# Patient Record
Sex: FEMALE | Race: WHITE | NOT HISPANIC OR LATINO | Employment: OTHER | URBAN - METROPOLITAN AREA
[De-identification: names, ages, dates, MRNs, and addresses within clinical notes are randomized per-mention and may not be internally consistent; named-entity substitution may affect disease eponyms.]

---

## 2017-11-13 ENCOUNTER — TRANSCRIBE ORDERS (OUTPATIENT)
Dept: ADMINISTRATIVE | Facility: HOSPITAL | Age: 82
End: 2017-11-13

## 2017-11-13 DIAGNOSIS — Z12.31 VISIT FOR SCREENING MAMMOGRAM: Primary | ICD-10-CM

## 2018-01-03 ENCOUNTER — HOSPITAL ENCOUNTER (OUTPATIENT)
Dept: MAMMOGRAPHY | Facility: HOSPITAL | Age: 83
Discharge: HOME | End: 2018-01-03
Admitting: INTERNAL MEDICINE

## 2018-01-03 DIAGNOSIS — Z12.31 VISIT FOR SCREENING MAMMOGRAM: ICD-10-CM

## 2018-01-03 PROCEDURE — 77067 SCR MAMMO BI INCL CAD: CPT | Performed by: RADIOLOGY

## 2018-01-03 PROCEDURE — 77067 SCR MAMMO BI INCL CAD: CPT

## 2018-01-03 PROCEDURE — 77063 BREAST TOMOSYNTHESIS BI: CPT

## 2018-01-03 PROCEDURE — 77063 BREAST TOMOSYNTHESIS BI: CPT | Performed by: RADIOLOGY

## 2018-12-11 ENCOUNTER — TRANSCRIBE ORDERS (OUTPATIENT)
Dept: ADMINISTRATIVE | Facility: HOSPITAL | Age: 83
End: 2018-12-11

## 2018-12-11 DIAGNOSIS — Z12.31 VISIT FOR SCREENING MAMMOGRAM: Primary | ICD-10-CM

## 2019-01-29 ENCOUNTER — HOSPITAL ENCOUNTER (OUTPATIENT)
Dept: MAMMOGRAPHY | Facility: HOSPITAL | Age: 84
Discharge: HOME | End: 2019-01-29
Admitting: INTERNAL MEDICINE

## 2019-01-29 DIAGNOSIS — Z12.31 VISIT FOR SCREENING MAMMOGRAM: ICD-10-CM

## 2019-01-29 PROCEDURE — 77067 SCR MAMMO BI INCL CAD: CPT

## 2019-01-29 PROCEDURE — 77063 BREAST TOMOSYNTHESIS BI: CPT

## 2019-01-29 PROCEDURE — 77063 BREAST TOMOSYNTHESIS BI: CPT | Performed by: RADIOLOGY

## 2019-01-29 PROCEDURE — 77067 SCR MAMMO BI INCL CAD: CPT | Performed by: RADIOLOGY

## 2019-12-17 ENCOUNTER — TRANSCRIBE ORDERS (OUTPATIENT)
Dept: ADMINISTRATIVE | Facility: HOSPITAL | Age: 84
End: 2019-12-17

## 2019-12-17 DIAGNOSIS — Z12.31 VISIT FOR SCREENING MAMMOGRAM: Primary | ICD-10-CM

## 2020-01-02 DIAGNOSIS — Z12.11 SCREENING FOR COLON CANCER: Primary | ICD-10-CM

## 2020-01-07 ENCOUNTER — TELEPHONE (OUTPATIENT)
Dept: GASTROENTEROLOGY | Facility: CLINIC | Age: 85
End: 2020-01-07

## 2020-01-07 NOTE — TELEPHONE ENCOUNTER
I called patient back. No answer; left voice message with Bowel prep instructions for Tommy BARTLETT

## 2020-01-08 ENCOUNTER — OUTSIDE FACILITY SERVICE (OUTPATIENT)
Dept: GASTROENTEROLOGY | Facility: CLINIC | Age: 85
End: 2020-01-08

## 2020-01-08 ENCOUNTER — LAB REQUISITION (OUTPATIENT)
Dept: LAB | Facility: HOSPITAL | Age: 85
End: 2020-01-08

## 2020-01-08 DIAGNOSIS — Z86.010 PERSONAL HISTORY OF COLONIC POLYPS: ICD-10-CM

## 2020-01-08 PROCEDURE — 45385 COLONOSCOPY W/LESION REMOVAL: CPT | Performed by: INTERNAL MEDICINE

## 2020-01-08 PROCEDURE — 88305 TISSUE EXAM BY PATHOLOGIST: CPT | Performed by: INTERNAL MEDICINE

## 2020-01-08 PROCEDURE — 45380 COLONOSCOPY AND BIOPSY: CPT | Performed by: INTERNAL MEDICINE

## 2020-01-09 LAB
CYTO UR: NORMAL
LAB AP CASE REPORT: NORMAL
LAB AP CLINICAL INFORMATION: NORMAL
PATH REPORT.FINAL DX SPEC: NORMAL
PATH REPORT.GROSS SPEC: NORMAL

## 2020-01-10 ENCOUNTER — TELEPHONE (OUTPATIENT)
Dept: GASTROENTEROLOGY | Facility: CLINIC | Age: 85
End: 2020-01-10

## 2020-01-10 NOTE — TELEPHONE ENCOUNTER
Dr Hernandes,   I spoke with patient this afternoon. She stated that she had a Colonoscopy on 1/8/2020. Patient complains of headache and dizziness. She stated she normally don't get headaches. Headache has subsided. Yes, she can take Tylenol for headache.  I advised patient to follow up with her PCP per Dr Hernandes.

## 2020-03-10 ENCOUNTER — HOSPITAL ENCOUNTER (OUTPATIENT)
Dept: MAMMOGRAPHY | Facility: HOSPITAL | Age: 85
Discharge: HOME | End: 2020-03-10
Admitting: INTERNAL MEDICINE

## 2020-03-10 DIAGNOSIS — Z12.31 VISIT FOR SCREENING MAMMOGRAM: ICD-10-CM

## 2020-03-10 PROCEDURE — 77067 SCR MAMMO BI INCL CAD: CPT

## 2020-03-10 PROCEDURE — 77063 BREAST TOMOSYNTHESIS BI: CPT | Performed by: RADIOLOGY

## 2020-03-10 PROCEDURE — 77067 SCR MAMMO BI INCL CAD: CPT | Performed by: RADIOLOGY

## 2020-03-10 PROCEDURE — 77063 BREAST TOMOSYNTHESIS BI: CPT

## 2020-03-11 ENCOUNTER — APPOINTMENT (OUTPATIENT)
Dept: MAMMOGRAPHY | Facility: HOSPITAL | Age: 85
End: 2020-03-11

## 2021-02-15 ENCOUNTER — TRANSCRIBE ORDERS (OUTPATIENT)
Dept: ADMINISTRATIVE | Facility: HOSPITAL | Age: 86
End: 2021-02-15

## 2021-02-15 DIAGNOSIS — Z12.31 VISIT FOR SCREENING MAMMOGRAM: Primary | ICD-10-CM

## 2021-04-07 ENCOUNTER — APPOINTMENT (OUTPATIENT)
Dept: MAMMOGRAPHY | Facility: HOSPITAL | Age: 86
End: 2021-04-07

## 2021-04-30 ENCOUNTER — HOSPITAL ENCOUNTER (OUTPATIENT)
Dept: MAMMOGRAPHY | Facility: HOSPITAL | Age: 86
Discharge: HOME | End: 2021-04-30
Admitting: INTERNAL MEDICINE

## 2021-04-30 DIAGNOSIS — Z12.31 VISIT FOR SCREENING MAMMOGRAM: ICD-10-CM

## 2021-04-30 PROCEDURE — 77063 BREAST TOMOSYNTHESIS BI: CPT | Performed by: RADIOLOGY

## 2021-04-30 PROCEDURE — 77063 BREAST TOMOSYNTHESIS BI: CPT

## 2021-04-30 PROCEDURE — 77067 SCR MAMMO BI INCL CAD: CPT

## 2021-04-30 PROCEDURE — 77067 SCR MAMMO BI INCL CAD: CPT | Performed by: RADIOLOGY

## 2021-12-09 PROCEDURE — U0005 INFEC AGEN DETEC AMPLI PROBE: HCPCS | Performed by: PHYSICIAN ASSISTANT

## 2021-12-09 PROCEDURE — U0004 COV-19 TEST NON-CDC HGH THRU: HCPCS | Performed by: PHYSICIAN ASSISTANT

## 2022-04-12 ENCOUNTER — TRANSCRIBE ORDERS (OUTPATIENT)
Dept: ADMINISTRATIVE | Facility: HOSPITAL | Age: 87
End: 2022-04-12

## 2022-04-12 DIAGNOSIS — Z12.31 VISIT FOR SCREENING MAMMOGRAM: Primary | ICD-10-CM

## 2022-05-03 ENCOUNTER — HOSPITAL ENCOUNTER (OUTPATIENT)
Dept: MAMMOGRAPHY | Facility: HOSPITAL | Age: 87
Discharge: HOME | End: 2022-05-03
Admitting: INTERNAL MEDICINE

## 2022-05-03 DIAGNOSIS — Z12.31 VISIT FOR SCREENING MAMMOGRAM: ICD-10-CM

## 2022-05-03 PROCEDURE — 77063 BREAST TOMOSYNTHESIS BI: CPT

## 2022-05-03 PROCEDURE — 77067 SCR MAMMO BI INCL CAD: CPT

## 2022-05-03 PROCEDURE — 77067 SCR MAMMO BI INCL CAD: CPT | Performed by: RADIOLOGY

## 2022-05-03 PROCEDURE — 77063 BREAST TOMOSYNTHESIS BI: CPT | Performed by: RADIOLOGY

## 2022-06-08 ENCOUNTER — APPOINTMENT (OUTPATIENT)
Dept: GENERAL RADIOLOGY | Facility: HOSPITAL | Age: 87
End: 2022-06-08

## 2022-06-08 ENCOUNTER — HOSPITAL ENCOUNTER (INPATIENT)
Facility: HOSPITAL | Age: 87
LOS: 1 days | Discharge: HOME | End: 2022-06-09
Attending: EMERGENCY MEDICINE | Admitting: INTERNAL MEDICINE

## 2022-06-08 DIAGNOSIS — I25.110 CORONARY ARTERY DISEASE INVOLVING NATIVE CORONARY ARTERY OF NATIVE HEART WITH UNSTABLE ANGINA PECTORIS: ICD-10-CM

## 2022-06-08 DIAGNOSIS — I21.4 NSTEMI (NON-ST ELEVATED MYOCARDIAL INFARCTION): Primary | ICD-10-CM

## 2022-06-08 PROBLEM — U07.1 COVID-19 VIRUS INFECTION: Status: ACTIVE | Noted: 2022-06-08

## 2022-06-08 LAB
ACT BLD: 219 SECONDS (ref 82–152)
ALBUMIN SERPL-MCNC: 3.8 G/DL (ref 3.5–5.2)
ALBUMIN/GLOB SERPL: 1.4 G/DL
ALP SERPL-CCNC: 81 U/L (ref 39–117)
ALT SERPL W P-5'-P-CCNC: 23 U/L (ref 1–33)
ANION GAP SERPL CALCULATED.3IONS-SCNC: 8 MMOL/L (ref 5–15)
APTT PPP: 29.2 SECONDS (ref 60–90)
AST SERPL-CCNC: 45 U/L (ref 1–32)
BASOPHILS # BLD AUTO: 0.02 10*3/MM3 (ref 0–0.2)
BASOPHILS NFR BLD AUTO: 0.2 % (ref 0–1.5)
BILIRUB SERPL-MCNC: 0.3 MG/DL (ref 0–1.2)
BUN SERPL-MCNC: 10 MG/DL (ref 8–23)
BUN/CREAT SERPL: 9.9 (ref 7–25)
CALCIUM SPEC-SCNC: 9.2 MG/DL (ref 8.6–10.5)
CHLORIDE SERPL-SCNC: 104 MMOL/L (ref 98–107)
CO2 SERPL-SCNC: 27 MMOL/L (ref 22–29)
CREAT SERPL-MCNC: 1.01 MG/DL (ref 0.57–1)
DEPRECATED RDW RBC AUTO: 43.6 FL (ref 37–54)
EGFRCR SERPLBLD CKD-EPI 2021: 54.3 ML/MIN/1.73
EOSINOPHIL # BLD AUTO: 0.04 10*3/MM3 (ref 0–0.4)
EOSINOPHIL NFR BLD AUTO: 0.5 % (ref 0.3–6.2)
ERYTHROCYTE [DISTWIDTH] IN BLOOD BY AUTOMATED COUNT: 12.1 % (ref 12.3–15.4)
GLOBULIN UR ELPH-MCNC: 2.7 GM/DL
GLUCOSE SERPL-MCNC: 143 MG/DL (ref 65–99)
HCT VFR BLD AUTO: 43 % (ref 34–46.6)
HGB BLD-MCNC: 14.4 G/DL (ref 12–15.9)
HOLD SPECIMEN: NORMAL
IMM GRANULOCYTES # BLD AUTO: 0.03 10*3/MM3 (ref 0–0.05)
IMM GRANULOCYTES NFR BLD AUTO: 0.4 % (ref 0–0.5)
INR PPP: 0.92 (ref 0.84–1.13)
LIPASE SERPL-CCNC: 24 U/L (ref 13–60)
LYMPHOCYTES # BLD AUTO: 2.01 10*3/MM3 (ref 0.7–3.1)
LYMPHOCYTES NFR BLD AUTO: 24.6 % (ref 19.6–45.3)
MCH RBC QN AUTO: 32.7 PG (ref 26.6–33)
MCHC RBC AUTO-ENTMCNC: 33.5 G/DL (ref 31.5–35.7)
MCV RBC AUTO: 97.7 FL (ref 79–97)
MONOCYTES # BLD AUTO: 0.48 10*3/MM3 (ref 0.1–0.9)
MONOCYTES NFR BLD AUTO: 5.9 % (ref 5–12)
NEUTROPHILS NFR BLD AUTO: 5.58 10*3/MM3 (ref 1.7–7)
NEUTROPHILS NFR BLD AUTO: 68.4 % (ref 42.7–76)
NRBC BLD AUTO-RTO: 0 /100 WBC (ref 0–0.2)
NT-PROBNP SERPL-MCNC: 2690 PG/ML (ref 0–1800)
PLATELET # BLD AUTO: 281 10*3/MM3 (ref 140–450)
PMV BLD AUTO: 11.6 FL (ref 6–12)
POTASSIUM SERPL-SCNC: 4.2 MMOL/L (ref 3.5–5.2)
PROT SERPL-MCNC: 6.5 G/DL (ref 6–8.5)
PROTHROMBIN TIME: 12.3 SECONDS (ref 11.4–14.4)
RBC # BLD AUTO: 4.4 10*6/MM3 (ref 3.77–5.28)
SODIUM SERPL-SCNC: 139 MMOL/L (ref 136–145)
TROPONIN T SERPL-MCNC: 0.57 NG/ML (ref 0–0.03)
TROPONIN T SERPL-MCNC: 0.66 NG/ML (ref 0–0.03)
TROPONIN T SERPL-MCNC: 0.71 NG/ML (ref 0–0.03)
UFH PPP CHRO-ACNC: 0.1 IU/ML (ref 0.3–0.7)
WBC NRBC COR # BLD: 8.16 10*3/MM3 (ref 3.4–10.8)
WHOLE BLOOD HOLD COAG: NORMAL
WHOLE BLOOD HOLD SPECIMEN: NORMAL

## 2022-06-08 PROCEDURE — 99284 EMERGENCY DEPT VISIT MOD MDM: CPT

## 2022-06-08 PROCEDURE — 93458 L HRT ARTERY/VENTRICLE ANGIO: CPT | Performed by: INTERNAL MEDICINE

## 2022-06-08 PROCEDURE — C1894 INTRO/SHEATH, NON-LASER: HCPCS | Performed by: INTERNAL MEDICINE

## 2022-06-08 PROCEDURE — 85730 THROMBOPLASTIN TIME PARTIAL: CPT | Performed by: PHYSICIAN ASSISTANT

## 2022-06-08 PROCEDURE — 99153 MOD SED SAME PHYS/QHP EA: CPT | Performed by: INTERNAL MEDICINE

## 2022-06-08 PROCEDURE — C1760 CLOSURE DEV, VASC: HCPCS | Performed by: INTERNAL MEDICINE

## 2022-06-08 PROCEDURE — 83690 ASSAY OF LIPASE: CPT

## 2022-06-08 PROCEDURE — G0378 HOSPITAL OBSERVATION PER HR: HCPCS

## 2022-06-08 PROCEDURE — 85520 HEPARIN ASSAY: CPT | Performed by: PHYSICIAN ASSISTANT

## 2022-06-08 PROCEDURE — 99152 MOD SED SAME PHYS/QHP 5/>YRS: CPT | Performed by: INTERNAL MEDICINE

## 2022-06-08 PROCEDURE — 25010000002 FENTANYL CITRATE (PF) 50 MCG/ML SOLUTION: Performed by: INTERNAL MEDICINE

## 2022-06-08 PROCEDURE — 25010000002 MIDAZOLAM PER 1 MG: Performed by: INTERNAL MEDICINE

## 2022-06-08 PROCEDURE — 93571 IV DOP VEL&/PRESS C FLO 1ST: CPT | Performed by: INTERNAL MEDICINE

## 2022-06-08 PROCEDURE — 71045 X-RAY EXAM CHEST 1 VIEW: CPT

## 2022-06-08 PROCEDURE — C1887 CATHETER, GUIDING: HCPCS | Performed by: INTERNAL MEDICINE

## 2022-06-08 PROCEDURE — B2151ZZ FLUOROSCOPY OF LEFT HEART USING LOW OSMOLAR CONTRAST: ICD-10-PCS | Performed by: INTERNAL MEDICINE

## 2022-06-08 PROCEDURE — C1769 GUIDE WIRE: HCPCS | Performed by: INTERNAL MEDICINE

## 2022-06-08 PROCEDURE — 80053 COMPREHEN METABOLIC PANEL: CPT

## 2022-06-08 PROCEDURE — 84484 ASSAY OF TROPONIN QUANT: CPT | Performed by: PHYSICIAN ASSISTANT

## 2022-06-08 PROCEDURE — 99223 1ST HOSP IP/OBS HIGH 75: CPT | Performed by: INTERNAL MEDICINE

## 2022-06-08 PROCEDURE — 93005 ELECTROCARDIOGRAM TRACING: CPT

## 2022-06-08 PROCEDURE — B2111ZZ FLUOROSCOPY OF MULTIPLE CORONARY ARTERIES USING LOW OSMOLAR CONTRAST: ICD-10-PCS | Performed by: INTERNAL MEDICINE

## 2022-06-08 PROCEDURE — 85025 COMPLETE CBC W/AUTO DIFF WBC: CPT

## 2022-06-08 PROCEDURE — 85347 COAGULATION TIME ACTIVATED: CPT

## 2022-06-08 PROCEDURE — 85610 PROTHROMBIN TIME: CPT | Performed by: PHYSICIAN ASSISTANT

## 2022-06-08 PROCEDURE — 4A023N7 MEASUREMENT OF CARDIAC SAMPLING AND PRESSURE, LEFT HEART, PERCUTANEOUS APPROACH: ICD-10-PCS | Performed by: INTERNAL MEDICINE

## 2022-06-08 PROCEDURE — 84484 ASSAY OF TROPONIN QUANT: CPT

## 2022-06-08 PROCEDURE — 0 IOPAMIDOL PER 1 ML: Performed by: INTERNAL MEDICINE

## 2022-06-08 PROCEDURE — 83880 ASSAY OF NATRIURETIC PEPTIDE: CPT

## 2022-06-08 PROCEDURE — 25010000002 HEPARIN (PORCINE) PER 1000 UNITS: Performed by: INTERNAL MEDICINE

## 2022-06-08 RX ORDER — LIDOCAINE HYDROCHLORIDE 10 MG/ML
INJECTION, SOLUTION EPIDURAL; INFILTRATION; INTRACAUDAL; PERINEURAL AS NEEDED
Status: DISCONTINUED | OUTPATIENT
Start: 2022-06-08 | End: 2022-06-08 | Stop reason: HOSPADM

## 2022-06-08 RX ORDER — ACETAMINOPHEN 325 MG/1
650 TABLET ORAL EVERY 4 HOURS PRN
Status: DISCONTINUED | OUTPATIENT
Start: 2022-06-08 | End: 2022-06-09 | Stop reason: HOSPADM

## 2022-06-08 RX ORDER — CHOLECALCIFEROL (VITAMIN D3) 125 MCG
10 CAPSULE ORAL NIGHTLY
COMMUNITY
Start: 2022-06-18

## 2022-06-08 RX ORDER — HEPARIN SODIUM 1000 [USP'U]/ML
INJECTION, SOLUTION INTRAVENOUS; SUBCUTANEOUS AS NEEDED
Status: DISCONTINUED | OUTPATIENT
Start: 2022-06-08 | End: 2022-06-08 | Stop reason: HOSPADM

## 2022-06-08 RX ORDER — GABAPENTIN 300 MG/1
300 CAPSULE ORAL 3 TIMES DAILY
Status: DISCONTINUED | OUTPATIENT
Start: 2022-06-08 | End: 2022-06-09 | Stop reason: HOSPADM

## 2022-06-08 RX ORDER — HEPARIN SODIUM 1000 [USP'U]/ML
4000 INJECTION, SOLUTION INTRAVENOUS; SUBCUTANEOUS ONCE
Status: DISCONTINUED | OUTPATIENT
Start: 2022-06-08 | End: 2022-06-09 | Stop reason: HOSPADM

## 2022-06-08 RX ORDER — ONDANSETRON 4 MG/1
4 TABLET, FILM COATED ORAL EVERY 6 HOURS PRN
Status: DISCONTINUED | OUTPATIENT
Start: 2022-06-08 | End: 2022-06-09 | Stop reason: HOSPADM

## 2022-06-08 RX ORDER — NITROGLYCERIN 20 MG/100ML
5-200 INJECTION INTRAVENOUS
Status: DISCONTINUED | OUTPATIENT
Start: 2022-06-08 | End: 2022-06-09 | Stop reason: HOSPADM

## 2022-06-08 RX ORDER — SODIUM CHLORIDE 0.9 % (FLUSH) 0.9 %
10 SYRINGE (ML) INJECTION AS NEEDED
Status: DISCONTINUED | OUTPATIENT
Start: 2022-06-08 | End: 2022-06-09 | Stop reason: HOSPADM

## 2022-06-08 RX ORDER — HEPARIN SODIUM 10000 [USP'U]/100ML
12 INJECTION, SOLUTION INTRAVENOUS
Status: DISCONTINUED | OUTPATIENT
Start: 2022-06-08 | End: 2022-06-08

## 2022-06-08 RX ORDER — ATORVASTATIN CALCIUM 40 MG/1
40 TABLET, FILM COATED ORAL NIGHTLY
Status: DISCONTINUED | OUTPATIENT
Start: 2022-06-08 | End: 2022-06-09 | Stop reason: HOSPADM

## 2022-06-08 RX ORDER — HEPARIN SODIUM 1000 [USP'U]/ML
50 INJECTION, SOLUTION INTRAVENOUS; SUBCUTANEOUS AS NEEDED
Status: DISCONTINUED | OUTPATIENT
Start: 2022-06-08 | End: 2022-06-08

## 2022-06-08 RX ORDER — GABAPENTIN 300 MG/1
600 CAPSULE ORAL NIGHTLY
COMMUNITY
Start: 2022-06-21

## 2022-06-08 RX ORDER — ASPIRIN 81 MG/1
324 TABLET, CHEWABLE ORAL ONCE
Status: DISCONTINUED | OUTPATIENT
Start: 2022-06-08 | End: 2022-06-09 | Stop reason: HOSPADM

## 2022-06-08 RX ORDER — VITAMIN B COMPLEX
1 CAPSULE ORAL DAILY
COMMUNITY

## 2022-06-08 RX ORDER — FAMOTIDINE 20 MG/1
20 TABLET, FILM COATED ORAL 2 TIMES DAILY
COMMUNITY
End: 2022-06-23

## 2022-06-08 RX ORDER — ISOSORBIDE MONONITRATE 30 MG/1
30 TABLET, EXTENDED RELEASE ORAL
Status: DISCONTINUED | OUTPATIENT
Start: 2022-06-09 | End: 2022-06-09 | Stop reason: HOSPADM

## 2022-06-08 RX ORDER — HEPARIN SODIUM 1000 [USP'U]/ML
25 INJECTION, SOLUTION INTRAVENOUS; SUBCUTANEOUS AS NEEDED
Status: DISCONTINUED | OUTPATIENT
Start: 2022-06-08 | End: 2022-06-08

## 2022-06-08 RX ORDER — SODIUM CHLORIDE 0.9 % (FLUSH) 0.9 %
10 SYRINGE (ML) INJECTION EVERY 12 HOURS SCHEDULED
Status: DISCONTINUED | OUTPATIENT
Start: 2022-06-08 | End: 2022-06-09 | Stop reason: HOSPADM

## 2022-06-08 RX ORDER — SODIUM CHLORIDE 9 MG/ML
3 INJECTION, SOLUTION INTRAVENOUS CONTINUOUS
Status: ACTIVE | OUTPATIENT
Start: 2022-06-08 | End: 2022-06-08

## 2022-06-08 RX ORDER — MIDAZOLAM HYDROCHLORIDE 1 MG/ML
INJECTION INTRAMUSCULAR; INTRAVENOUS AS NEEDED
Status: DISCONTINUED | OUTPATIENT
Start: 2022-06-08 | End: 2022-06-08 | Stop reason: HOSPADM

## 2022-06-08 RX ORDER — CHOLECALCIFEROL (VITAMIN D3) 125 MCG
10 CAPSULE ORAL NIGHTLY
Status: DISCONTINUED | OUTPATIENT
Start: 2022-06-08 | End: 2022-06-09 | Stop reason: HOSPADM

## 2022-06-08 RX ORDER — ASPIRIN 81 MG/1
81 TABLET, CHEWABLE ORAL DAILY
COMMUNITY

## 2022-06-08 RX ORDER — ALUMINA, MAGNESIA, AND SIMETHICONE 2400; 2400; 240 MG/30ML; MG/30ML; MG/30ML
15 SUSPENSION ORAL EVERY 6 HOURS PRN
Status: DISCONTINUED | OUTPATIENT
Start: 2022-06-08 | End: 2022-06-09 | Stop reason: HOSPADM

## 2022-06-08 RX ORDER — FLUTICASONE PROPIONATE 50 MCG
2 SPRAY, SUSPENSION (ML) NASAL DAILY
COMMUNITY

## 2022-06-08 RX ORDER — DIPHENHYDRAMINE HCL 25 MG
25 TABLET ORAL 2 TIMES DAILY PRN
COMMUNITY
Start: 2022-06-21

## 2022-06-08 RX ORDER — LISINOPRIL 5 MG/1
5 TABLET ORAL DAILY
Status: DISCONTINUED | OUTPATIENT
Start: 2022-06-08 | End: 2022-06-09 | Stop reason: HOSPADM

## 2022-06-08 RX ORDER — FENTANYL CITRATE 50 UG/ML
INJECTION, SOLUTION INTRAMUSCULAR; INTRAVENOUS AS NEEDED
Status: DISCONTINUED | OUTPATIENT
Start: 2022-06-08 | End: 2022-06-08 | Stop reason: HOSPADM

## 2022-06-08 RX ORDER — ONDANSETRON 2 MG/ML
4 INJECTION INTRAMUSCULAR; INTRAVENOUS EVERY 6 HOURS PRN
Status: DISCONTINUED | OUTPATIENT
Start: 2022-06-08 | End: 2022-06-09 | Stop reason: HOSPADM

## 2022-06-08 RX ORDER — ASPIRIN 81 MG/1
81 TABLET ORAL DAILY
Status: DISCONTINUED | OUTPATIENT
Start: 2022-06-09 | End: 2022-06-09 | Stop reason: HOSPADM

## 2022-06-08 RX ORDER — HYDROCODONE BITARTRATE AND ACETAMINOPHEN 5; 325 MG/1; MG/1
1 TABLET ORAL EVERY 4 HOURS PRN
Status: DISCONTINUED | OUTPATIENT
Start: 2022-06-08 | End: 2022-06-09 | Stop reason: HOSPADM

## 2022-06-08 RX ADMIN — SODIUM CHLORIDE 3 ML/KG/HR: 9 INJECTION, SOLUTION INTRAVENOUS at 17:11

## 2022-06-08 RX ADMIN — GABAPENTIN 300 MG: 300 CAPSULE ORAL at 21:19

## 2022-06-08 RX ADMIN — ATORVASTATIN CALCIUM 40 MG: 40 TABLET, FILM COATED ORAL at 20:54

## 2022-06-08 RX ADMIN — Medication 10 MG: at 21:19

## 2022-06-08 NOTE — ED PROVIDER NOTES
"Subjective   Ms. Betts is a pleasant 87-year-old female who presents to the emergency department with complaints of \"chest tightness\" that began this morning at around 930 while at rest.  The patient had no associated nausea or vomiting or shortness of breath or diaphoresis.  She did have some flushed face sensation.  The patient went to urgent care and was referred to our emergency department for further evaluation.  The patient states that she was recently diagnosed with COVID about 2 weeks ago and has completed her 10-day quarantine.  She denies any significant cough.  No fevers.  She has no other symptoms other than chronic leg cramps for which she takes gabapentin.  She also has a history of GERD.  No prior history of heart disease.  She states that she has never had any heart catheterization or stress test in the past.  She denies any history of hypertension or hyperlipidemia.  She is a non-smoker.  She drinks a cocktail with dinner every night.          Review of Systems   Constitutional: Negative for chills, diaphoresis and fever.   HENT: Negative for sore throat.    Respiratory: Positive for chest tightness. Negative for cough and shortness of breath.    Cardiovascular: Positive for chest pain.   Gastrointestinal: Negative for abdominal pain, diarrhea, nausea and vomiting.   Endocrine: Negative for polyuria.   Genitourinary: Negative for dysuria.   Musculoskeletal: Negative for back pain.   Skin: Negative for pallor and rash.   Neurological: Negative for light-headedness and headaches.   Hematological: Negative.    Psychiatric/Behavioral: Negative.        History reviewed. No pertinent past medical history.    Not on File    History reviewed. No pertinent surgical history.    Family History   Problem Relation Age of Onset   • Ovarian cancer Daughter 46   • Breast cancer Maternal Aunt 60       Social History     Socioeconomic History   • Marital status:    Tobacco Use   • Smoking status: Never Smoker "   • Smokeless tobacco: Never Used   Vaping Use   • Vaping Use: Never used   Substance and Sexual Activity   • Alcohol use: Never   • Drug use: Never   • Sexual activity: Defer           Objective   Physical Exam  HENT:      Head: Normocephalic.      Nose: Nose normal.      Mouth/Throat:      Mouth: Mucous membranes are moist.   Eyes:      General: No scleral icterus.     Conjunctiva/sclera: Conjunctivae normal.      Pupils: Pupils are equal, round, and reactive to light.   Cardiovascular:      Rate and Rhythm: Normal rate and regular rhythm.      Pulses: Normal pulses.      Heart sounds: Normal heart sounds.   Pulmonary:      Effort: Pulmonary effort is normal. No respiratory distress.      Breath sounds: Normal breath sounds. No wheezing, rhonchi or rales.   Abdominal:      General: Bowel sounds are normal.      Tenderness: There is no abdominal tenderness. There is no guarding.   Musculoskeletal:         General: No tenderness. Normal range of motion.      Cervical back: Normal range of motion.      Right lower leg: No edema.      Left lower leg: No edema.   Skin:     General: Skin is warm and dry.      Findings: No rash.   Neurological:      General: No focal deficit present.      Mental Status: She is alert and oriented to person, place, and time.   Psychiatric:         Mood and Affect: Mood normal.         Critical Care  Performed by: Cecilio Grier PA  Authorized by: Gaston Mejia MD     Critical care provider statement:     Critical care time (minutes):  60    Critical care time was exclusive of:  Separately billable procedures and treating other patients    Critical care was necessary to treat or prevent imminent or life-threatening deterioration of the following conditions: NSTEMI requiring heparin bolus and infusion and nitroglycerin infusion.    Critical care was time spent personally by me on the following activities:  Development of treatment plan with patient or surrogate, evaluation of  "patient's response to treatment, examination of patient, obtaining history from patient or surrogate, ordering and performing treatments and interventions, ordering and review of laboratory studies, ordering and review of radiographic studies, pulse oximetry, re-evaluation of patient's condition, discussions with consultants and review of old charts    Heart Score:  7           ED Course      .now  Pt resting comfortably.  She states her chest tightness has \"nearly resolved\" and is currently about 2/10.  Her EKG was reviewed independently by me and shows sinus bradycardia with a rate of 57 with some T abnormalities laterally concerning for ischemia.      Initial troponin is elevated at 0.659.      ProBNP is 2690.  Glucose is up a bit at 143. No other concerning labs.    Chest xray was reviewed by me and shows nothing acute.     Given her elevated troponin and her EKG abnormalities, I think this is likely an NSTEMI.  I started her on Heparin bolus and drip and Nitro drip and gave her an aspirin.      !400:  I spoke with Dr. Koo (cardiology) who advised they would come and see her in the ER.  He advised that he did not need a COVID swab since she was 2 wks out from [facility].      Dr. Koo saw the pt in the ER and states he will plan to take her to the cath lab.                                                             [facility]    Final diagnoses:   NSTEMI (non-ST elevated myocardial infarction) (HCC)       ED Disposition  ED Disposition     ED Disposition   Decision to Admit    Condition   --    Comment   Level of Care: Telemetry [5]   Diagnosis: NSTEMI (non-ST elevated myocardial infarction) (HCC) [656243]               No follow-up provider specified.       Medication List      No changes were made to your prescriptions during this visit.          Cecilio Grier PA  06/08/22 5189    "

## 2022-06-08 NOTE — PLAN OF CARE
Goal Outcome Evaluation:  Plan of Care Reviewed With: patient, son        Progress: improving  Outcome Evaluation: Patient receved from cath lab.  Son at bedside.

## 2022-06-08 NOTE — H&P
[facility] at [facility]  HISTORY AND PHYSICAL    Monique Betts  : 1935  MRN:[id number]    Date of Admission:2022    PCP: Mignon Zamora MD    IDENTIFICATION: A 87 y.o. female resident of [place], [place]     Chief Complaint   Patient presents with   • Chest Pain     PROBLEM LIST:   Active Hospital Problems    Diagnosis    • **NSTEMI (non-ST elevated myocardial infarction) (HCC)    • COVID-19 virus infection      HPI: Mrs. Betts is a pleasant 88 y/o female with no significant past medical history and no prior cardiac history who is seen in consultation for NSTEMI. She reports midsternal chest tightness which started this morning while she was out and about doing her regular activities. She was diagnosed with Covid on  and completed her 10 day quarantine on Friday 6/3. She had only mild symptoms of cough. Denies any significant dyspnea, and no chest pain prior to today. In the ER her initial troponin is positive at 0.659, and EKG shows inferolateral T wave inversions. Currently comfortable at rest.     ROS: All systems have been reviewed and are negative with the exception of those mentioned in the HPI and problem list above.    ALLERGIES: Not on File    HOME MEDICINES:   Prior to Admission Medications     Prescriptions Last Dose Informant Patient Reported? Taking?    Sod Picosulfate-Mag Ox-Cit Acd 10-3.5-12 MG-GM -GM/160ML solution   No No    Take 1 kit by mouth Take As Directed. Follow instructions that were mailed to your home. If you didn't receive these call [phone].          Surgical History: No past surgical history on file.    Social History:   Social History     Socioeconomic History   • Marital status:        Family History:   Family History   Problem Relation Age of Onset   • Ovarian cancer Daughter 46   • Breast cancer Maternal Aunt 60       Objective     /65 (BP Location: Left arm, Patient Position: Sitting)   Pulse 51   Temp 98 °F (36.7  "°C) (Oral)   Resp 16   Ht 162.6 cm (64\")   Wt 70.8 kg (156 lb)   SpO2 97%   BMI 26.78 kg/m²   No intake or output data in the 24 hours ending 06/08/22 1417    PHYSICAL EXAM:  CONSTITUTIONAL: Well nourished, cooperative, in no acute distress  HEENT: Normocephalic, atraumatic, PERRLA, no JVD  CARDIOVASCULAR:  Regular rhythm and normal rate, no murmur, gallop, rub.   RESPIRATORY: Clear to auscultation, normal respiratory effort, no wheezing, rales or ronchi  GI: Soft, nontender, normal bowel sounds  EXTREMITIES: No gross deformities, no edema.   SKIN: Warm, dry. No bleeding, bruising or rash  NEUROLOGICAL: No focal deficits  PSYCHIATRIC: Normal mood and affect. Behavior is normal     Labs/Diagnostic Data  Results from last 7 days   Lab Units 06/08/22  1208   SODIUM mmol/L 139   POTASSIUM mmol/L 4.2   CHLORIDE mmol/L 104   CO2 mmol/L 27.0   BUN mg/dL 10   CREATININE mg/dL 1.01*   GLUCOSE mg/dL 143*   CALCIUM mg/dL 9.2     Results from last 7 days   Lab Units 06/08/22  1208   TROPONIN T ng/mL 0.659*     Results from last 7 days   Lab Units 06/08/22  1208   WBC 10*3/mm3 8.16   HEMOGLOBIN g/dL 14.4   HEMATOCRIT % 43.0   PLATELETS 10*3/mm3 281         Results from last 7 days   Lab Units 06/08/22  1208   PROBNP pg/mL 2,690.0*           I personally reviewed the patient's EKG/Telemetry data    Radiology Data:   CXR 6/8/22:    IMPRESSION:  No evidence of acute disease in the chest.     Current Medications:    aspirin, 324 mg, Oral, Once  heparin (porcine), 4,000 Units, Intravenous, Once      heparin, 12 Units/kg/hr  nitroglycerin, 5-200 mcg/min  Pharmacy to Dose Heparin,         Assessment and Plan:     1. NSTEMI   - troponin 0.659, EKG with inferolateral T wave inversions  - proceed with [facility] today - risks, benefits and alternatives discussed with the patient and she is aware and agrees to proceed  - check echo     2. Recent Covid 19 infection  - diagnosed 5/25, completed 10 day quarantine 6/3    3. Unknown lipid " status  - check AM lipid panel      Scribed for Antonino Koo MD by Natalie Way PA-C. 6/8/2022  14:40 EDT    The risks, benefits, and alternative options of cardiac catheterization were discussed with the patient and her son by phone.  The risks include death, MI, stroke, infection, vascular injury requiring surgical repair and/or blood transfusion, coronary dissection, renal dysfunction/failure, allergic reaction, emergent CABG.  If PCI is needed there is a 1-2% risk of emergent CABG.  The patient is agreeable for cardiac catheterization, possible PCI or CABG. Plan is to proceed with cardiac catheterization and possible PCI.     I,Antonino Koo M.D., personally performed the services described in this documentation as scribed by the above named individual in my presence, and it is both accurate and complete.      Antonino Koo M.D., F.A.C.C.  Interventional Cardiology  06/08/22  15:11 EDT

## 2022-06-09 ENCOUNTER — APPOINTMENT (OUTPATIENT)
Dept: CARDIOLOGY | Facility: HOSPITAL | Age: 87
End: 2022-06-09

## 2022-06-09 ENCOUNTER — READMISSION MANAGEMENT (OUTPATIENT)
Dept: CALL CENTER | Facility: HOSPITAL | Age: 87
End: 2022-06-09

## 2022-06-09 VITALS
OXYGEN SATURATION: 94 % | RESPIRATION RATE: 16 BRPM | TEMPERATURE: 98.6 F | BODY MASS INDEX: 26.63 KG/M2 | SYSTOLIC BLOOD PRESSURE: 123 MMHG | HEIGHT: 64 IN | HEART RATE: 51 BPM | WEIGHT: 156 LBS | DIASTOLIC BLOOD PRESSURE: 47 MMHG

## 2022-06-09 LAB
ANION GAP SERPL CALCULATED.3IONS-SCNC: 5 MMOL/L (ref 5–15)
BH CV ECHO MEAS - AI P1/2T: 600.4 MSEC
BH CV ECHO MEAS - AO MAX PG: 18.5 MMHG
BH CV ECHO MEAS - AO MEAN PG: 10.5 MMHG
BH CV ECHO MEAS - AO ROOT DIAM: 2.8 CM
BH CV ECHO MEAS - AO V2 MAX: 214.9 CM/SEC
BH CV ECHO MEAS - AO V2 VTI: 49.7 CM
BH CV ECHO MEAS - AVA(I,D): 2.32 CM2
BH CV ECHO MEAS - EDV(CUBED): 102.1 ML
BH CV ECHO MEAS - EDV(MOD-SP2): 82.1 ML
BH CV ECHO MEAS - EDV(MOD-SP4): 99.5 ML
BH CV ECHO MEAS - EF(MOD-SP2): 70.6 %
BH CV ECHO MEAS - EF(MOD-SP4): 69 %
BH CV ECHO MEAS - ESV(CUBED): 16.9 ML
BH CV ECHO MEAS - ESV(MOD-SP2): 24.1 ML
BH CV ECHO MEAS - ESV(MOD-SP4): 30.8 ML
BH CV ECHO MEAS - FS: 45 %
BH CV ECHO MEAS - IVS/LVPW: 1.1 CM
BH CV ECHO MEAS - IVSD: 0.82 CM
BH CV ECHO MEAS - LA DIMENSION: 4.3 CM
BH CV ECHO MEAS - LAT PEAK E' VEL: 8.3 CM/SEC
BH CV ECHO MEAS - LV MASS(C)D: 118.6 GRAMS
BH CV ECHO MEAS - LV MAX PG: 9.2 MMHG
BH CV ECHO MEAS - LV MEAN PG: 5 MMHG
BH CV ECHO MEAS - LV V1 MAX: 151.8 CM/SEC
BH CV ECHO MEAS - LV V1 VTI: 36.6 CM
BH CV ECHO MEAS - LVIDD: 4.7 CM
BH CV ECHO MEAS - LVIDS: 2.6 CM
BH CV ECHO MEAS - LVOT AREA: 3.2 CM2
BH CV ECHO MEAS - LVOT DIAM: 2 CM
BH CV ECHO MEAS - LVPWD: 0.75 CM
BH CV ECHO MEAS - MED PEAK E' VEL: 7.5 CM/SEC
BH CV ECHO MEAS - MV A MAX VEL: 90.2 CM/SEC
BH CV ECHO MEAS - MV DEC SLOPE: 337.3 CM/SEC2
BH CV ECHO MEAS - MV DEC TIME: 0.29 MSEC
BH CV ECHO MEAS - MV E MAX VEL: 81.5 CM/SEC
BH CV ECHO MEAS - MV E/A: 0.9
BH CV ECHO MEAS - MV MAX PG: 3.7 MMHG
BH CV ECHO MEAS - MV MEAN PG: 1.36 MMHG
BH CV ECHO MEAS - MV P1/2T: 78.4 MSEC
BH CV ECHO MEAS - MV V2 VTI: 33.8 CM
BH CV ECHO MEAS - MVA(P1/2T): 2.8 CM2
BH CV ECHO MEAS - MVA(VTI): 3.4 CM2
BH CV ECHO MEAS - PA ACC TIME: 0.15 SEC
BH CV ECHO MEAS - PA PR(ACCEL): 10.9 MMHG
BH CV ECHO MEAS - PA V2 MAX: 100.4 CM/SEC
BH CV ECHO MEAS - RAP SYSTOLE: 3 MMHG
BH CV ECHO MEAS - RVSP: 26 MMHG
BH CV ECHO MEAS - SV(LVOT): 115.3 ML
BH CV ECHO MEAS - SV(MOD-SP2): 58 ML
BH CV ECHO MEAS - SV(MOD-SP4): 68.7 ML
BH CV ECHO MEAS - TAPSE (>1.6): 1.96 CM
BH CV ECHO MEAS - TR MAX PG: 23.4 MMHG
BH CV ECHO MEAS - TR MAX VEL: 241.8 CM/SEC
BH CV ECHO MEASUREMENTS AVERAGE E/E' RATIO: 10.32
BH CV VAS BP LEFT ARM: NORMAL MMHG
BH CV XLRA - RV BASE: 3.5 CM
BH CV XLRA - RV LENGTH: 4.3 CM
BH CV XLRA - RV MID: 2.41 CM
BH CV XLRA - TDI S': 13.3 CM/SEC
BUN SERPL-MCNC: 13 MG/DL (ref 8–23)
BUN/CREAT SERPL: 14.4 (ref 7–25)
CALCIUM SPEC-SCNC: 8.7 MG/DL (ref 8.6–10.5)
CHLORIDE SERPL-SCNC: 105 MMOL/L (ref 98–107)
CHOLEST SERPL-MCNC: 155 MG/DL (ref 0–200)
CO2 SERPL-SCNC: 26 MMOL/L (ref 22–29)
CREAT SERPL-MCNC: 0.9 MG/DL (ref 0.57–1)
DEPRECATED RDW RBC AUTO: 41.8 FL (ref 37–54)
EGFRCR SERPLBLD CKD-EPI 2021: 62.4 ML/MIN/1.73
ERYTHROCYTE [DISTWIDTH] IN BLOOD BY AUTOMATED COUNT: 12.3 % (ref 12.3–15.4)
GLUCOSE SERPL-MCNC: 131 MG/DL (ref 65–99)
HBA1C MFR BLD: 6.5 % (ref 4.8–5.6)
HCT VFR BLD AUTO: 36 % (ref 34–46.6)
HDLC SERPL-MCNC: 37 MG/DL (ref 40–60)
HGB BLD-MCNC: 12.3 G/DL (ref 12–15.9)
LDLC SERPL CALC-MCNC: 91 MG/DL (ref 0–100)
LDLC/HDLC SERPL: 2.36 {RATIO}
LEFT ATRIUM VOLUME INDEX: 35.6 ML/M2
MAXIMAL PREDICTED HEART RATE: 134 BPM
MCH RBC QN AUTO: 31.9 PG (ref 26.6–33)
MCHC RBC AUTO-ENTMCNC: 34.2 G/DL (ref 31.5–35.7)
MCV RBC AUTO: 93.3 FL (ref 79–97)
PLATELET # BLD AUTO: 203 10*3/MM3 (ref 140–450)
PMV BLD AUTO: 11.6 FL (ref 6–12)
POTASSIUM SERPL-SCNC: 4.2 MMOL/L (ref 3.5–5.2)
RBC # BLD AUTO: 3.86 10*6/MM3 (ref 3.77–5.28)
SODIUM SERPL-SCNC: 136 MMOL/L (ref 136–145)
STRESS TARGET HR: 114 BPM
TRIGL SERPL-MCNC: 154 MG/DL (ref 0–150)
TROPONIN T SERPL-MCNC: 0.35 NG/ML (ref 0–0.03)
TSH SERPL DL<=0.05 MIU/L-ACNC: 2.42 UIU/ML (ref 0.27–4.2)
VLDLC SERPL-MCNC: 27 MG/DL (ref 5–40)
WBC NRBC COR # BLD: 5.83 10*3/MM3 (ref 3.4–10.8)

## 2022-06-09 PROCEDURE — 84484 ASSAY OF TROPONIN QUANT: CPT | Performed by: INTERNAL MEDICINE

## 2022-06-09 PROCEDURE — 80048 BASIC METABOLIC PNL TOTAL CA: CPT | Performed by: INTERNAL MEDICINE

## 2022-06-09 PROCEDURE — 84443 ASSAY THYROID STIM HORMONE: CPT | Performed by: PHYSICIAN ASSISTANT

## 2022-06-09 PROCEDURE — 93306 TTE W/DOPPLER COMPLETE: CPT | Performed by: INTERNAL MEDICINE

## 2022-06-09 PROCEDURE — 85027 COMPLETE CBC AUTOMATED: CPT | Performed by: PHYSICIAN ASSISTANT

## 2022-06-09 PROCEDURE — 93306 TTE W/DOPPLER COMPLETE: CPT

## 2022-06-09 PROCEDURE — 83036 HEMOGLOBIN GLYCOSYLATED A1C: CPT | Performed by: INTERNAL MEDICINE

## 2022-06-09 PROCEDURE — 80061 LIPID PANEL: CPT | Performed by: INTERNAL MEDICINE

## 2022-06-09 RX ORDER — LISINOPRIL 5 MG/1
5 TABLET ORAL DAILY
Qty: 30 TABLET | Refills: 11 | Status: SHIPPED | OUTPATIENT
Start: 2022-06-09 | End: 2022-09-22 | Stop reason: SINTOL

## 2022-06-09 RX ORDER — CLOPIDOGREL BISULFATE 75 MG/1
75 TABLET ORAL DAILY
Qty: 30 TABLET | Refills: 11 | Status: SHIPPED | OUTPATIENT
Start: 2022-06-09 | End: 2022-12-21 | Stop reason: SINTOL

## 2022-06-09 RX ORDER — ISOSORBIDE MONONITRATE 30 MG/1
30 TABLET, EXTENDED RELEASE ORAL
Qty: 30 TABLET | Refills: 5 | Status: SHIPPED | OUTPATIENT
Start: 2022-06-09 | End: 2022-12-12

## 2022-06-09 RX ORDER — ATORVASTATIN CALCIUM 20 MG/1
20 TABLET, FILM COATED ORAL NIGHTLY
Qty: 30 TABLET | Refills: 11 | Status: SHIPPED | OUTPATIENT
Start: 2022-06-09

## 2022-06-09 RX ADMIN — ASPIRIN 81 MG: 81 TABLET, COATED ORAL at 08:55

## 2022-06-09 RX ADMIN — GABAPENTIN 300 MG: 300 CAPSULE ORAL at 08:55

## 2022-06-09 RX ADMIN — Medication 10 ML: at 08:56

## 2022-06-09 RX ADMIN — ISOSORBIDE MONONITRATE 30 MG: 30 TABLET, EXTENDED RELEASE ORAL at 08:55

## 2022-06-09 RX ADMIN — LISINOPRIL 5 MG: 5 TABLET ORAL at 08:55

## 2022-06-09 NOTE — PROGRESS NOTES
"  [facility] at [facility]  PROGRESS NOTE    Date of Admission: 6/8/2022  Date of Service: 06/09/22    Primary Care Physician: Mignon Zamora MD    Chief Complaint: f/u NSTEMI   Problem List:   NSTEMI (non-ST elevated myocardial infarction) (HCC)    COVID-19 virus infection    Coronary artery disease involving native coronary artery of native heart with unstable angina pectoris (HCC)      Subjective      Asymptomatic overnight. Ready for discharge home     Objective   Vitals: /47 (BP Location: Left arm, Patient Position: Lying)   Pulse 51   Temp 98.6 °F (37 °C) (Oral)   Resp 16   Ht 162.6 cm (64\")   Wt 70.8 kg (156 lb)   SpO2 94%   BMI 26.78 kg/m²     Physical Exam:  GENERAL: Alert, cooperative, in no acute distress.   HEENT: Normocephalic, no jugular venous distention  HEART: Regular rhythm, normal rate, and no murmurs, gallops, or rubs.   LUNGS: Clear to auscultation bilaterally. No wheezing, rales or rhonchi.  ABDOMEN: Soft, bowel sounds present, nontender   NEUROLOGIC: No focal abnormalities involving strength or sensation are noted.   EXTREMITIES: No clubbing, cyanosis, or edema noted.     Results:  Results from last 7 days   Lab Units 06/09/22  0516 06/08/22  1208   WBC 10*3/mm3 5.83 8.16   HEMOGLOBIN g/dL 12.3 14.4   HEMATOCRIT % 36.0 43.0   PLATELETS 10*3/mm3 203 281     Results from last 7 days   Lab Units 06/09/22  0516 06/08/22  1208   SODIUM mmol/L 136 139   POTASSIUM mmol/L 4.2 4.2   CHLORIDE mmol/L 105 104   CO2 mmol/L 26.0 27.0   BUN mg/dL 13 10   CREATININE mg/dL 0.90 1.01*   GLUCOSE mg/dL 131* 143*      Lab Results   Component Value Date    CHOL 155 06/09/2022    TRIG 154 (H) 06/09/2022    HDL 37 (L) 06/09/2022    LDL 91 06/09/2022    AST 45 (H) 06/08/2022    ALT 23 06/08/2022     Results from last 7 days   Lab Units 06/09/22  0516   HEMOGLOBIN A1C % 6.50*     Results from last 7 days   Lab Units 06/09/22  0516   CHOLESTEROL mg/dL 155   TRIGLYCERIDES mg/dL " 154*   HDL CHOL mg/dL 37*   LDL CHOL mg/dL 91     Results from last 7 days   Lab Units 06/09/22  0516   TSH uIU/mL 2.420         Results from last 7 days   Lab Units 06/08/22  1208   PROTIME Seconds 12.3   INR  0.92   APTT seconds 29.2*     Results from last 7 days   Lab Units 06/09/22  0516 06/08/22  1806 06/08/22  1352   TROPONIN T ng/mL 0.349* 0.712* 0.574*     Results from last 7 days   Lab Units 06/08/22  1208   PROBNP pg/mL 2,690.0*       Intake/Output Summary (Last 24 hours) at 6/9/2022 0800  Last data filed at 6/8/2022 2055  Gross per 24 hour   Intake 461 ml   Output --   Net 461 ml     I personally reviewed the patient's EKG/Telemetry data    Radiology Data:   [facility] 6/8/22:  IMPRESSION:  · Moderate nonobstructive CAD  · LAD mid 50 to 60% stenosis, IFR negative for ischemia 0.90  · Proximal mid RCA tandem 30 to 40% stenoses, normal circumflex  · LVEDP elevated at 20 mmHg  · LVEF 55 to 60%     RECOMMENDATIONS:  · Medical management for nonobstructive CAD  · And continue work-up for other causes of elevated troponin, may be type II supply demand mismatch.  · No evidence of plaque rupture or occlusive coronary disease on today's study.    Current Medications:  aspirin, 324 mg, Oral, Once  aspirin, 81 mg, Oral, Daily  atorvastatin, 40 mg, Oral, Nightly  gabapentin, 300 mg, Oral, TID  heparin (porcine), 4,000 Units, Intravenous, Once  isosorbide mononitrate, 30 mg, Oral, Q24H  lisinopril, 5 mg, Oral, Daily  melatonin, 10 mg, Oral, Nightly  sodium chloride, 10 mL, Intravenous, Q12H      nitroglycerin, 5-200 mcg/min        Assessment and Plan:   1. Type II NSTEMI   - troponin max 0.712, EKG with inferolateral T wave inversions  - [facility] yesterday with moderate nonobstructive disease, type II NSTEMI due to supply/mismatch    - LVEF 55-60%  - echo is pending  - continue ASA, statin, Imdur   - add Plavix 75mg daily      2. Recent Covid 19 infection  - diagnosed 5/25, completed 10 day quarantine 6/3     3. Dyslipidemia  -  lipid panel reviewed and acceptable      Disposition: OK for discharge home today following echo on above medicines. Will need follow up with our office in 4-6 weeks.       Electronically signed by Natalie Way PA-C, 06/09/22, 8:03 AM EDT.

## 2022-06-09 NOTE — PLAN OF CARE
Goal Outcome Evaluation:  Plan of Care Reviewed With: patient, son        Progress: improving  Discharge order received.  Patient educated r/t new meds guidelines for home care post cath.

## 2022-06-09 NOTE — CASE MANAGEMENT/SOCIAL WORK
Continued Stay Note  [facility]     Patient Name: Monique Betts  MRN: [id number]  Today's Date: 6/9/2022    Admit Date: 6/8/2022     Discharge Plan     Row Name 06/09/22 1454       Plan    Plan CM note    Plan Comments Patient discharged prior to  being able to evaluate for discharge needs. Patient was discharged home where she lives in [facility] - [facility] [phone]               Discharge Codes    No documentation.               Expected Discharge Date and Time     Expected Discharge Date Expected Discharge Time    Jun 9, 2022 12:58 PM            Cortney Penn RN

## 2022-06-10 LAB
QT INTERVAL: 402 MS
QT INTERVAL: 488 MS
QTC INTERVAL: 411 MS
QTC INTERVAL: 453 MS

## 2022-06-10 NOTE — OUTREACH NOTE
Prep Survey    Flowsheet Row Responses   [facility] facility patient discharged from? [place]   Is LACE score < 7 ? Yes   Emergency Room discharge w/ pulse ox? No   Eligibility [facility]   Date of Admission 06/08/22   Date of Discharge 06/09/22   Discharge Disposition Home or Self Care   Discharge diagnosis CARDIAC CATHETERIZATION  NSTEMI   Does the patient have one of the following disease processes/diagnoses(primary or secondary)? Acute MI (STEMI,NSTEMI)   Does the patient have Home health ordered? No   Is there a DME ordered? No   Prep survey completed? Yes          KAYLYN CASEY - Registered Nurse

## 2022-06-13 ENCOUNTER — HOSPITAL ENCOUNTER (EMERGENCY)
Facility: HOSPITAL | Age: 87
Discharge: HOME | End: 2022-06-13
Attending: EMERGENCY MEDICINE | Admitting: EMERGENCY MEDICINE

## 2022-06-13 ENCOUNTER — DOCUMENTATION (OUTPATIENT)
Dept: CARDIAC REHAB | Facility: HOSPITAL | Age: 87
End: 2022-06-13

## 2022-06-13 ENCOUNTER — APPOINTMENT (OUTPATIENT)
Dept: GENERAL RADIOLOGY | Facility: HOSPITAL | Age: 87
End: 2022-06-13

## 2022-06-13 VITALS
HEART RATE: 58 BPM | DIASTOLIC BLOOD PRESSURE: 78 MMHG | SYSTOLIC BLOOD PRESSURE: 96 MMHG | HEIGHT: 64 IN | RESPIRATION RATE: 16 BRPM | WEIGHT: 154 LBS | BODY MASS INDEX: 26.29 KG/M2 | TEMPERATURE: 98.2 F | OXYGEN SATURATION: 96 %

## 2022-06-13 DIAGNOSIS — I25.2 HISTORY OF NON-ST ELEVATION MYOCARDIAL INFARCTION (NSTEMI): ICD-10-CM

## 2022-06-13 DIAGNOSIS — R07.9 NONSPECIFIC CHEST PAIN: Primary | ICD-10-CM

## 2022-06-13 LAB
ALBUMIN SERPL-MCNC: 3.8 G/DL (ref 3.5–5.2)
ALBUMIN/GLOB SERPL: 1.5 G/DL
ALP SERPL-CCNC: 86 U/L (ref 39–117)
ALT SERPL W P-5'-P-CCNC: 21 U/L (ref 1–33)
ANION GAP SERPL CALCULATED.3IONS-SCNC: 9 MMOL/L (ref 5–15)
AST SERPL-CCNC: 25 U/L (ref 1–32)
BASOPHILS # BLD AUTO: 0.04 10*3/MM3 (ref 0–0.2)
BASOPHILS NFR BLD AUTO: 0.7 % (ref 0–1.5)
BILIRUB SERPL-MCNC: 0.3 MG/DL (ref 0–1.2)
BUN SERPL-MCNC: 12 MG/DL (ref 8–23)
BUN/CREAT SERPL: 10.3 (ref 7–25)
CALCIUM SPEC-SCNC: 9.3 MG/DL (ref 8.6–10.5)
CHLORIDE SERPL-SCNC: 109 MMOL/L (ref 98–107)
CO2 SERPL-SCNC: 25 MMOL/L (ref 22–29)
CREAT SERPL-MCNC: 1.16 MG/DL (ref 0.57–1)
DEPRECATED RDW RBC AUTO: 43.1 FL (ref 37–54)
EGFRCR SERPLBLD CKD-EPI 2021: 46 ML/MIN/1.73
EOSINOPHIL # BLD AUTO: 0.1 10*3/MM3 (ref 0–0.4)
EOSINOPHIL NFR BLD AUTO: 1.7 % (ref 0.3–6.2)
ERYTHROCYTE [DISTWIDTH] IN BLOOD BY AUTOMATED COUNT: 12.7 % (ref 12.3–15.4)
GLOBULIN UR ELPH-MCNC: 2.6 GM/DL
GLUCOSE SERPL-MCNC: 136 MG/DL (ref 65–99)
HCT VFR BLD AUTO: 36.9 % (ref 34–46.6)
HGB BLD-MCNC: 12.5 G/DL (ref 12–15.9)
HOLD SPECIMEN: NORMAL
IMM GRANULOCYTES # BLD AUTO: 0.01 10*3/MM3 (ref 0–0.05)
IMM GRANULOCYTES NFR BLD AUTO: 0.2 % (ref 0–0.5)
LIPASE SERPL-CCNC: 34 U/L (ref 13–60)
LYMPHOCYTES # BLD AUTO: 2.11 10*3/MM3 (ref 0.7–3.1)
LYMPHOCYTES NFR BLD AUTO: 34.9 % (ref 19.6–45.3)
MCH RBC QN AUTO: 32 PG (ref 26.6–33)
MCHC RBC AUTO-ENTMCNC: 33.9 G/DL (ref 31.5–35.7)
MCV RBC AUTO: 94.4 FL (ref 79–97)
MONOCYTES # BLD AUTO: 0.59 10*3/MM3 (ref 0.1–0.9)
MONOCYTES NFR BLD AUTO: 9.8 % (ref 5–12)
NEUTROPHILS NFR BLD AUTO: 3.2 10*3/MM3 (ref 1.7–7)
NEUTROPHILS NFR BLD AUTO: 52.7 % (ref 42.7–76)
NRBC BLD AUTO-RTO: 0 /100 WBC (ref 0–0.2)
NT-PROBNP SERPL-MCNC: 503.9 PG/ML (ref 0–1800)
PLATELET # BLD AUTO: 209 10*3/MM3 (ref 140–450)
PMV BLD AUTO: 11.7 FL (ref 6–12)
POTASSIUM SERPL-SCNC: 4.3 MMOL/L (ref 3.5–5.2)
PROT SERPL-MCNC: 6.4 G/DL (ref 6–8.5)
RBC # BLD AUTO: 3.91 10*6/MM3 (ref 3.77–5.28)
SODIUM SERPL-SCNC: 143 MMOL/L (ref 136–145)
TROPONIN T SERPL-MCNC: 0.11 NG/ML (ref 0–0.03)
TROPONIN T SERPL-MCNC: 0.16 NG/ML (ref 0–0.03)
WBC NRBC COR # BLD: 6.05 10*3/MM3 (ref 3.4–10.8)
WHOLE BLOOD HOLD COAG: NORMAL
WHOLE BLOOD HOLD SPECIMEN: NORMAL

## 2022-06-13 PROCEDURE — 84484 ASSAY OF TROPONIN QUANT: CPT | Performed by: EMERGENCY MEDICINE

## 2022-06-13 PROCEDURE — 83880 ASSAY OF NATRIURETIC PEPTIDE: CPT

## 2022-06-13 PROCEDURE — 36415 COLL VENOUS BLD VENIPUNCTURE: CPT

## 2022-06-13 PROCEDURE — 99284 EMERGENCY DEPT VISIT MOD MDM: CPT

## 2022-06-13 PROCEDURE — 83690 ASSAY OF LIPASE: CPT

## 2022-06-13 PROCEDURE — 93005 ELECTROCARDIOGRAM TRACING: CPT | Performed by: EMERGENCY MEDICINE

## 2022-06-13 PROCEDURE — 71045 X-RAY EXAM CHEST 1 VIEW: CPT

## 2022-06-13 PROCEDURE — 80053 COMPREHEN METABOLIC PANEL: CPT

## 2022-06-13 PROCEDURE — 84484 ASSAY OF TROPONIN QUANT: CPT

## 2022-06-13 PROCEDURE — 93005 ELECTROCARDIOGRAM TRACING: CPT

## 2022-06-13 PROCEDURE — 85025 COMPLETE CBC W/AUTO DIFF WBC: CPT

## 2022-06-13 PROCEDURE — 99283 EMERGENCY DEPT VISIT LOW MDM: CPT

## 2022-06-13 RX ORDER — ASPIRIN 81 MG/1
324 TABLET, CHEWABLE ORAL ONCE
Status: DISCONTINUED | OUTPATIENT
Start: 2022-06-13 | End: 2022-06-13 | Stop reason: HOSPADM

## 2022-06-13 RX ORDER — SODIUM CHLORIDE 0.9 % (FLUSH) 0.9 %
10 SYRINGE (ML) INJECTION AS NEEDED
Status: DISCONTINUED | OUTPATIENT
Start: 2022-06-13 | End: 2022-06-13 | Stop reason: HOSPADM

## 2022-06-14 ENCOUNTER — READMISSION MANAGEMENT (OUTPATIENT)
Dept: CALL CENTER | Facility: HOSPITAL | Age: 87
End: 2022-06-14

## 2022-06-14 NOTE — OUTREACH NOTE
AMI Week 1 Survey    Flowsheet Row Responses   [facility] patient discharged from? [place]   Does the patient have one of the following disease processes/diagnoses(primary or secondary)? Acute MI (STEMI,NSTEMI)   Week 1 attempt successful? Yes   Call start time 1101   Call end time 1119   Is patient permission given to speak with other caregiver? No   Meds reviewed with patient/caregiver? Yes   Is the patient having any side effects they believe may be caused by any medication additions or changes? No   Does the patient have all prescriptions related to this admission filled (includes statins,anticoagulants,HTN meds,anti-arrhythmia meds) Yes   Is the patient taking all medications as directed (includes completed medication regime)? Yes   Comments regarding appointments PCP appt 06/23/22, cardiology appt 07/21/22.   Does the patient have a primary care provider?  Yes   Does the patient have an appointment with their PCP,cardiologist,or clinic within 7 days of discharge? Greater than 7 days   What is preventing the patient from scheduling follow up appointments within 7 days of discharge? Earlier appointment not available   Nursing Interventions Verified appointment date/time/provider   Has the patient kept scheduled appointments due by today? N/A   Comments States had to delay nerve stimulator surgeries for back and bladder.   Has home health visited the patient within 72 hours of discharge? N/A   Psychosocial issues? No   Did the patient receive a copy of their discharge instructions? Yes   Nursing interventions Reviewed instructions with patient   What is the patient's perception of their health status since discharge? Same   Nursing interventions Nurse provided patient education   Is the patient/caregiver able to teach back signs and symptoms of when to call for help immediately: Sudden chest discomfort, Sudden sweating or clammy skin, Irregular or rapid heart rate   Nursing interventions Nurse provided  patient education   Is the pateint /caregiver able to teach back the importance of cardiac rehab? No   Nursing interventions Provided education on importance of cardiac rehab   Is the patient/caregiver able to teach back lifestyle changes to help prevent MIs Maintaining a healthy weight, Regular exercise as approved by provider, Heart healthy diet, Reducing stress   Is the patient/caregiver able to teach back ways to prevent a second heart attack: Take medications, Participate in Cardiac Rehab, Follow up with MD, Manage risk factors   If the patient is a current smoker, are they able to teach back resources for cessation? Not a smoker   Is the patient/caregiver able to teach back the hierarchy of who to call/visit for symptoms/problems? PCP, Specialist, Home health nurse, Urgent Care, ED, 911 Yes   Week 1 call completed? Yes   Wrap up additional comments States is feeling better since ER visit last night. Denies any further chest pain or cardiac s/s. States still has slight fatigue since discharge. Voiced interest in  services-advised patient to discuss with her PCP. Denies any needs today.          LAURA PALM - Registered Nurse

## 2022-06-16 ENCOUNTER — TELEPHONE (OUTPATIENT)
Dept: CARDIOLOGY | Facility: CLINIC | Age: 87
End: 2022-06-16

## 2022-06-16 DIAGNOSIS — Z74.09 MOBILITY IMPAIRED: ICD-10-CM

## 2022-06-16 DIAGNOSIS — I21.4 NSTEMI (NON-ST ELEVATED MYOCARDIAL INFARCTION): Primary | ICD-10-CM

## 2022-06-16 NOTE — TELEPHONE ENCOUNTER
Pt calling to request a home health order for help with mobility and medication issues after her recent heart cath. She lives alone and has some questions regarding her medications. Advised her she could use some help with medication management, pt agreeable. Orders placed.

## 2022-06-17 ENCOUNTER — HOME HEALTH ADMISSION (OUTPATIENT)
Dept: HOME HEALTH SERVICES | Facility: HOME HEALTHCARE | Age: 87
End: 2022-06-17

## 2022-06-18 ENCOUNTER — HOME CARE VISIT (OUTPATIENT)
Dept: HOME HEALTH SERVICES | Facility: HOME HEALTHCARE | Age: 87
End: 2022-06-18

## 2022-06-18 VITALS
HEART RATE: 60 BPM | OXYGEN SATURATION: 95 % | TEMPERATURE: 98.1 F | DIASTOLIC BLOOD PRESSURE: 60 MMHG | RESPIRATION RATE: 16 BRPM | SYSTOLIC BLOOD PRESSURE: 108 MMHG

## 2022-06-18 PROCEDURE — G0299 HHS/HOSPICE OF RN EA 15 MIN: HCPCS

## 2022-06-18 NOTE — HOME HEALTH
Home Health ordered for SN.  PT eval ordered d/t balance issues and recent falls.   Reason for Hospitalization/primary dx/comorbidities: NSTEMI.  Recently diagnosed with Covid-19 last month, CAD, heartburn.  Focus of care: Medication education and disease process education(NSTEMI).    Current functional status/mobility/DME: Minimal assist.  Ambulates with stand-by assist.  Has a walker if needed.    HB status/living arrangements: Patient is homebound and lives with alone.  Granddaughter lives just a few miles away.    Skin integrity/wound status: No open wounds or skin breakdown noted.   Code status: Full code   POC to be confirmed with Dr. Koo's staff on Monday when office is open.

## 2022-06-20 LAB
QT INTERVAL: 410 MS
QT INTERVAL: 458 MS
QTC INTERVAL: 410 MS
QTC INTERVAL: 422 MS

## 2022-06-20 NOTE — ED PROVIDER NOTES
[place]    EMERGENCY DEPARTMENT ENCOUNTER      Pt Name: Monique Betts  MRN: [id number]  YOB: 1935  Date of evaluation: 6/13/2022  Provider: MATHEUS Wing    CHIEF COMPLAINT       Chief Complaint   Patient presents with   • Chest Pain         HISTORY OF PRESENT ILLNESS  (Location/Symptom, Timing/Onset, Context/Setting, Quality, Duration, Modifying Factors, Severity.)   Monique Betts is a 87 y.o. female who presents to the emergency department with complaints of chest pain.  Patient shares that she was recently admitted to this facility with a heart attack.  She reports being discharged and having follow-up scheduled with cardiology.  She states today that while walking she began to experience the pain in the center of her chest.  She describes the pain as a tingling sensation.  She reports no additional associated symptoms with her pain.  At the time of her most recent presentation to the ER she underwent a heart cath and was found to have a 50% blockage but was determined to not be a candidate for stent placement.  On interview and exam patient's symptoms of chest pain has subsided.  Patient is on antiplatelet therapy medication with aspirin and Plavix.    \[facility] [place] [facility]\""   Nursing notes were reviewed.    REVIEW OF SYSTEMS    (2-9 systems for level 4, 10 or more for level 5)   Review of Systems   Constitutional: Negative.    HENT: Negative.  Negative for congestion.    Respiratory: Negative.    Cardiovascular: Positive for chest pain.   Gastrointestinal: Negative.    Genitourinary: Negative.    Neurological: Negative.    Psychiatric/Behavioral: The patient is nervous/anxious.         All systems reviewed and negative except for those discussed in HPI.   PAST MEDICAL HISTORY   History reviewed. No pertinent past medical history.      SURGICAL HISTORY       Past Surgical History:   Procedure Laterality Date   • CARDIAC CATHETERIZATION N/A 6/8/2022    Procedure: LEFT HEART CATH;  Surgeon: Antonino Koo  MD NÉSTOR;  Location: [facility] INVASIVE LOCATION;  Service: Cardiovascular;  Laterality: N/A;         CURRENT MEDICATIONS     No current facility-administered medications for this encounter.    Current Outpatient Medications:   •  aspirin 81 MG chewable tablet, Chew 81 mg Daily., Disp: , Rfl:   •  atorvastatin (LIPITOR) 20 MG tablet, Take 1 tablet by mouth Every Night., Disp: 30 tablet, Rfl: 11  •  B Complex Vitamins (vitamin b complex) capsule capsule, Take 1 capsule by mouth Daily., Disp: , Rfl:   •  clopidogrel (PLAVIX) 75 MG tablet, Take 1 tablet by mouth Daily., Disp: 30 tablet, Rfl: 11  •  diphenhydrAMINE (BENADRYL) 25 MG tablet, Take 25 mg by mouth 2 (Two) Times a Day As Needed., Disp: , Rfl:   •  famotidine (PEPCID) 20 MG tablet, Take 20 mg by mouth 2 (Two) Times a Day., Disp: , Rfl:   •  fluticasone (FLONASE) 50 MCG/ACT nasal spray, 2 sprays into the nostril(s) as directed by provider Daily., Disp: , Rfl:   •  gabapentin (NEURONTIN) 300 MG capsule, Take 300 mg by mouth 3 (Three) Times a Day., Disp: , Rfl:   •  isosorbide mononitrate (IMDUR) 30 MG 24 hr tablet, Take 1 tablet by mouth Daily., Disp: 30 tablet, Rfl: 5  •  lisinopril (PRINIVIL,ZESTRIL) 5 MG tablet, Take 1 tablet by mouth Daily., Disp: 30 tablet, Rfl: 11  •  melatonin 5 MG tablet tablet, Take 10 mg by mouth., Disp: , Rfl:     ALLERGIES     Patient has no known allergies.    FAMILY HISTORY       Family History   Problem Relation Age of Onset   • Ovarian cancer Daughter 46   • Breast cancer Maternal Aunt 60          SOCIAL HISTORY       Social History     Socioeconomic History   • Marital status:    Tobacco Use   • Smoking status: Never Smoker   • Smokeless tobacco: Never Used   Vaping Use   • Vaping Use: Never used   Substance and Sexual Activity   • Alcohol use: Never   • Drug use: Never   • Sexual activity: Defer         PHYSICAL EXAM    (up to 7 for level 4, 8 or more for level 5)   Physical Exam  Vitals and nursing note reviewed.    Constitutional:       General: She is not in acute distress.     Appearance: Normal appearance. She is well-developed. She is not ill-appearing or toxic-appearing.   HENT:      Head: Normocephalic and atraumatic.      Nose: Nose normal.      Mouth/Throat:      Mouth: Mucous membranes are moist.   Eyes:      Extraocular Movements: Extraocular movements intact.   Cardiovascular:      Rate and Rhythm: Normal rate and regular rhythm.      Heart sounds: Normal heart sounds.   Pulmonary:      Effort: Pulmonary effort is normal. No respiratory distress.      Breath sounds: Normal breath sounds.   Chest:      Chest wall: No tenderness.   Abdominal:      General: There is no distension.      Palpations: Abdomen is soft.      Tenderness: There is no abdominal tenderness.   Musculoskeletal:         General: Normal range of motion.      Cervical back: Normal range of motion.   Skin:     General: Skin is warm and dry.   Neurological:      General: No focal deficit present.      Mental Status: She is alert.   Psychiatric:         Mood and Affect: Mood is anxious.         Behavior: Behavior normal.         Thought Content: Thought content normal.         Judgment: Judgment normal.          DIAGNOSTIC RESULTS     EKG: All EKGs are interpreted by the Emergency Department Physician who either signs or Co-signs this chart in the absence of a cardiologist.    ECG 12 Lead   Final Result   Test Reason : chest pain   Blood Pressure :   */*   mmHG   Vent. Rate :  51 BPM     Atrial Rate :  51 BPM      P-R Int : 148 ms          QRS Dur :  88 ms       QT Int : 458 ms       P-R-T Axes :  40 -12 -12 degrees      QTc Int : 422 ms      ** Poor data quality, interpretation may be adversely affected   Sinus bradycardia   Nonspecific T wave abnormality   Abnormal ECG   When compared with ECG of 13-JUN-2022 17:26, (Unconfirmed)   No significant change was found   Confirmed by DESMOND LOTT MD ([id number]) on 6/20/2022 5:39:47 AM      Referred By: EDMD            Confirmed By: DESMOND LOTT MD      ECG 12 Lead   Final Result   Test Reason : chest pain   Blood Pressure :   */*   mmHG   Vent. Rate :  60 BPM     Atrial Rate :  60 BPM      P-R Int : 156 ms          QRS Dur :  84 ms       QT Int : 410 ms       P-R-T Axes :  39 -26 -42 degrees      QTc Int : 410 ms      Normal sinus rhythm   Nonspecific ST and T wave abnormality   Abnormal ECG   When compared with ECG of 08-JUN-2022 12:13,   Sinus rhythm has replaced Atrial fibrillation   QRS axis shifted left   T wave inversion less evident in Anterolateral leads   Confirmed by DESMOND LOTT MD ([id number]) on 6/20/2022 5:39:35 AM      Referred By:            Confirmed By: DESMOND LOTT MD          RADIOLOGY:   Non-plain film images such as CT, Ultrasound and MRI are read by the radiologist. Plain radiographic images are visualized and preliminarily interpreted by the emergency physician with the below findings:      [x] Radiologist's Report Reviewed:  XR Chest 1 View   Final Result   1. No acute cardiopulmonary abnormality.       This report was finalized on 6/13/2022 8:41 PM by Chemo Marx MD.                ED BEDSIDE ULTRASOUND:   Performed by ED Physician - none    LABS:    I have reviewed and interpreted all of the currently available lab results from this visit (if applicable):  Results for orders placed or performed during the hospital encounter of 06/13/22   Troponin    Specimen: Blood   Result Value Ref Range    Troponin T 0.159 (C) 0.000 - 0.030 ng/mL   Troponin    Specimen: Blood   Result Value Ref Range    Troponin T 0.111 (C) 0.000 - 0.030 ng/mL   Comprehensive Metabolic Panel    Specimen: Blood   Result Value Ref Range    Glucose 136 (H) 65 - 99 mg/dL    BUN 12 8 - 23 mg/dL    Creatinine 1.16 (H) 0.57 - 1.00 mg/dL    Sodium 143 136 - 145 mmol/L    Potassium 4.3 3.5 - 5.2 mmol/L    Chloride 109 (H) 98 - 107 mmol/L    CO2 25.0 22.0 - 29.0 mmol/L    Calcium 9.3 8.6 - 10.5 mg/dL    Total Protein 6.4 6.0 - 8.5  g/dL    Albumin 3.80 3.50 - 5.20 g/dL    ALT (SGPT) 21 1 - 33 U/L    AST (SGOT) 25 1 - 32 U/L    Alkaline Phosphatase 86 39 - 117 U/L    Total Bilirubin 0.3 0.0 - 1.2 mg/dL    Globulin 2.6 gm/dL    A/G Ratio 1.5 g/dL    BUN/Creatinine Ratio 10.3 7.0 - 25.0    Anion Gap 9.0 5.0 - 15.0 mmol/L    eGFR 46.0 (L) >60.0 mL/min/1.73   Lipase    Specimen: Blood   Result Value Ref Range    Lipase 34 13 - 60 U/L   BNP    Specimen: Blood   Result Value Ref Range    proBNP 503.9 0.0 - 1,800.0 pg/mL   CBC Auto Differential    Specimen: Blood   Result Value Ref Range    WBC 6.05 3.40 - 10.80 10*3/mm3    RBC 3.91 3.77 - 5.28 10*6/mm3    Hemoglobin 12.5 12.0 - 15.9 g/dL    Hematocrit 36.9 34.0 - 46.6 %    MCV 94.4 79.0 - 97.0 fL    MCH 32.0 26.6 - 33.0 pg    MCHC 33.9 31.5 - 35.7 g/dL    RDW 12.7 12.3 - 15.4 %    RDW-SD 43.1 37.0 - 54.0 fl    MPV 11.7 6.0 - 12.0 fL    Platelets 209 140 - 450 10*3/mm3    Neutrophil % 52.7 42.7 - 76.0 %    Lymphocyte % 34.9 19.6 - 45.3 %    Monocyte % 9.8 5.0 - 12.0 %    Eosinophil % 1.7 0.3 - 6.2 %    Basophil % 0.7 0.0 - 1.5 %    Immature Grans % 0.2 0.0 - 0.5 %    Neutrophils, Absolute 3.20 1.70 - 7.00 10*3/mm3    Lymphocytes, Absolute 2.11 0.70 - 3.10 10*3/mm3    Monocytes, Absolute 0.59 0.10 - 0.90 10*3/mm3    Eosinophils, Absolute 0.10 0.00 - 0.40 10*3/mm3    Basophils, Absolute 0.04 0.00 - 0.20 10*3/mm3    Immature Grans, Absolute 0.01 0.00 - 0.05 10*3/mm3    nRBC 0.0 0.0 - 0.2 /100 WBC   ECG 12 Lead   Result Value Ref Range    QT Interval 410 ms    QTC Interval 410 ms   ECG 12 Lead   Result Value Ref Range    QT Interval 458 ms    QTC Interval 422 ms   Green Top (Gel)   Result Value Ref Range    Extra Tube Hold for add-ons.    Lavender Top   Result Value Ref Range    Extra Tube hold for add-on    Gold Top - SST   Result Value Ref Range    Extra Tube Hold for add-ons.    Gray Top   Result Value Ref Range    Extra Tube Hold for add-ons.    Light Blue Top   Result Value Ref Range    Extra Tube  Hold for add-ons.         All other labs were within normal range or not returned as of this dictation.      EMERGENCY DEPARTMENT COURSE and DIFFERENTIAL DIAGNOSIS/MDM:   Vitals:    Vitals:    06/13/22 2045 06/13/22 2100 06/13/22 2115 06/13/22 2145   BP: 142/72 137/68 162/74 96/78   BP Location:       Patient Position:       Pulse: 53 51 52 58   Resp:       Temp:       TempSrc:       SpO2: 94% 92% 96% 96%   Weight:       Height:           ED Course as of 06/20/22 1123   Mon Jun 13, 2022   1829 Troponin T(!!): 0.159  Troponin trending down from most recent taken 3 days ago. [RS]   2054 Patient reviewed with Dr. Zimmerman who recommended consult to Cardiology. [JG]   2100 I spoke with Dr. Justin with cardiology.  He does not feel that patient needs to be admitted to the hospital.  He recommended communication through a message to him and he will organize patient to be seen in closer follow-up. [JG]   2142 In summary this is a 87-year-old female who presents to the emergency department with complaints of chest pain.  Recent admission and heart cath without stent placement.  Troponin continues to trend down.  Remainder of labs without acute or emergent abnormalities.  EKG without evidence of acute ischemic changes.  Chest x-ray demonstrates no acute cardiopulmonary process.  Case was reviewed with cardiologist Dr. Justin with recommendations as documented.Patient is chest pain free, afebrile, nontoxic appearing, vital signs stable and able to maintain O2 sats of 96% on room air. Patient will be discharged home with symptomatic care and outpatient follow up.  [JG]      ED Course User Index  [JG] Justus Person PA  [RS] Luis Miguel Zimmerman MD     MDM  Number of Diagnoses or Management Options  History of non-ST elevation myocardial infarction (NSTEMI): new, needed workup  Nonspecific chest pain: new, needed workup     Amount and/or Complexity of Data Reviewed  Clinical lab tests: reviewed  Tests in the radiology  section of CPT®: reviewed  Discuss the patient with other providers: yes    Risk of Complications, Morbidity, and/or Mortality  Presenting problems: moderate  Diagnostic procedures: moderate  Management options: moderate    Patient Progress  Patient progress: resolved       I had a discussion with the patient/family regarding diagnosis, diagnostic results, treatment plan, and medications.  The patient/family indicated understanding of these instructions.  I spent adequate time at the bedside preceding discharge necessary to personally discuss the aftercare instructions, giving patient education, providing explanations of the results of our evaluations/findings, and my decision making to assure that the patient/family understand the plan of care.  Time was allotted to answer questions at that time and throughout the ED course.  Emphasis was placed on timely follow-up after discharge.  I also discussed the potential for the development of an acute emergent condition requiring further evaluation, admission, or even surgical intervention. I discussed that we found nothing during the visit today indicating the need for further workup, admission, or the presence of an unstable medical condition.  I encouraged the patient to return to the emergency department immediately for ANY concerns, worsening, new complaints, or if symptoms persist and unable to seek follow-up in a timely fashion.  The patient/family expressed understanding and agreement with this plan.  The patient will follow-up with primary care and cardiology for reevaluation.       MEDICATIONS ADMINISTERED IN ED:  Medications - No data to display    PROCEDURES:  Procedures          CRITICAL CARE TIME    Total Critical Care time was 0 minutes, excluding separately reportable procedures.   There was a high probability of clinically significant/life threatening deterioration in the patient's condition which required my urgent intervention.      FINAL IMPRESSION       1. Nonspecific chest pain    2. History of non-ST elevation myocardial infarction (NSTEMI)          DISPOSITION/PLAN     ED Disposition     ED Disposition   Discharge    Condition   Stable    Comment   --             PATIENT REFERRED TO:  Mignon Zamora MD  [address] [facility] [address] [phone]    Call   As needed for follow up with primary care    Natalie Way PA-C  [address] [facility]  [address]  [facility] [address] [phone]      You will be contacted by cardiology for close follow up appointment    [facility] Emergency Department  [address] [facility] [address] [phone]  Go to   If symptoms worsen      DISCHARGE MEDICATIONS:     Medication List      CONTINUE taking these medications    aspirin 81 MG chewable tablet     atorvastatin 20 MG tablet  Commonly known as: LIPITOR  Take 1 tablet by mouth Every Night.     clopidogrel 75 MG tablet  Commonly known as: PLAVIX  Take 1 tablet by mouth Daily.     diphenhydrAMINE 25 MG tablet  Commonly known as: BENADRYL     famotidine 20 MG tablet  Commonly known as: PEPCID     fluticasone 50 MCG/ACT nasal spray  Commonly known as: FLONASE     gabapentin 300 MG capsule  Commonly known as: NEURONTIN     isosorbide mononitrate 30 MG 24 hr tablet  Commonly known as: IMDUR  Take 1 tablet by mouth Daily.     lisinopril 5 MG tablet  Commonly known as: PRINIVIL,ZESTRIL  Take 1 tablet by mouth Daily.     melatonin 5 MG tablet tablet     vitamin b complex capsule capsule                Comment: Please note this report has been produced using speech recognition software.      MATHEUS Wing Jason C, PA  06/20/22 1121

## 2022-06-21 ENCOUNTER — HOME CARE VISIT (OUTPATIENT)
Dept: HOME HEALTH SERVICES | Facility: HOME HEALTHCARE | Age: 87
End: 2022-06-21

## 2022-06-21 ENCOUNTER — READMISSION MANAGEMENT (OUTPATIENT)
Dept: CALL CENTER | Facility: HOSPITAL | Age: 87
End: 2022-06-21

## 2022-06-21 VITALS
RESPIRATION RATE: 16 BRPM | SYSTOLIC BLOOD PRESSURE: 146 MMHG | TEMPERATURE: 96.4 F | HEART RATE: 54 BPM | DIASTOLIC BLOOD PRESSURE: 74 MMHG | OXYGEN SATURATION: 98 %

## 2022-06-21 VITALS
HEART RATE: 69 BPM | RESPIRATION RATE: 18 BRPM | OXYGEN SATURATION: 96 % | TEMPERATURE: 98.6 F | SYSTOLIC BLOOD PRESSURE: 120 MMHG | DIASTOLIC BLOOD PRESSURE: 58 MMHG

## 2022-06-21 PROCEDURE — G0151 HHCP-SERV OF PT,EA 15 MIN: HCPCS

## 2022-06-21 PROCEDURE — G0299 HHS/HOSPICE OF RN EA 15 MIN: HCPCS

## 2022-06-21 NOTE — HOME HEALTH
PT jennifer ordered d/t balance issues and recent falls.   Reason for Hospitalization/primary dx/comorbidities: NSTEMI. Recently diagnosed with Covid-19 last month, CAD, heartburn.   Focus of care: balance, fall risk reduction  Current functional status/mobility/DME: Minimal assist. Ambulates with stand-by assist. Has a walker if needed, many floor covering leading to increased risk of falls  HB status/living arrangements: Patient is homebound and lives with alone. Granddaughter lives just a few miles away.   Skin integrity/wound status: No open wounds or skin breakdown noted.   Code status: Full code   POC confirmed w/ pt  Extensive edu re: homebound requirments for home care as pt does mention wanting to drive and be independent. If she chooses to not be homebound we can refer to outpatient therapy        Additional detail from PT eavl:   H/o BPPV, but thinks approx 1 mo ago when PT outpatient tried to fix it, that it did not do any good  1 fall - unexpected lowering to sitting when carrying a big bag and   7 sit<>stands  for 30S chair stand  9.05 TUG  FGA components indicate pt has difficulty with higher level balance  cleared cspine and vertebral artery, assessed salbador hallpike, negative for R and L anterior/posterior canals, no reports of possible horizontal canal involvement

## 2022-06-21 NOTE — OUTREACH NOTE
AMI Week 2 Survey    Flowsheet Row Responses   [facility] facility patient discharged from? [place]   Does the patient have one of the following disease processes/diagnoses(primary or secondary)? Acute MI (STEMI,NSTEMI)   Week 2 attempt successful? No   Unsuccessful attempts Attempt 1          REYNALDO ANDERSON - Registered Nurse

## 2022-06-22 ENCOUNTER — READMISSION MANAGEMENT (OUTPATIENT)
Dept: CALL CENTER | Facility: HOSPITAL | Age: 87
End: 2022-06-22

## 2022-06-22 PROBLEM — I10 ESSENTIAL HYPERTENSION: Status: ACTIVE | Noted: 2022-06-22

## 2022-06-22 PROBLEM — E78.5 DYSLIPIDEMIA, GOAL LDL BELOW 70: Status: ACTIVE | Noted: 2022-06-22

## 2022-06-22 NOTE — OUTREACH NOTE
AMI Week 2 Survey    Flowsheet Row Responses   [facility] facility patient discharged from? [place]   Does the patient have one of the following disease processes/diagnoses(primary or secondary)? Acute MI (STEMI,NSTEMI)   Week 2 attempt successful? No   Unsuccessful attempts Attempt 2          SAMY MERAZ - Licensed Nurse

## 2022-06-22 NOTE — CASE COMMUNICATION
Thank you for seeing this pt. Pt is being treated s/p NSTEMI }, and POC is focused on higher level balance and fall risk reduction  Please see PT eval home care note for further detail on significant PMH.  Precautions/Restrictions Include:h/o falls  Please be sure to address higher level balance starting at first visit (ex: walking over uneven surfaces, carrying objects while walking, head turns) and recommend home safey  Thank you, p lease contact me with any questions, changes in status or difficulty scheduling that may result in a missed visit.  Lurdes Arevalo, PT

## 2022-06-22 NOTE — PROGRESS NOTES
OFFICE VISIT  NOTE  [facility] CARDIOLOGY      Name: Monique Betts    Date: 2022  MRN:  [id number]  :  1935      REFERRING/PRIMARY PROVIDER:  Provider, No Known     Chief Complaint   Patient presents with   • Coronary Artery Disease       HPI: Monique Betts is a 87 y.o. female who presents today for hospital follow up from -22 for NSTEMI. She had no significant past medical history and no prior cardiac history. Presented with midsternal chest tightness with activity, troponins slightly elevated to max 0.7, EKGs with nonspecific findings. She had recently been diagnosed with Covid on  and completed her 10 day quarantine on Friday 6/3. Cardiac cath demonstrated moderate nonobstructive CAD with normal iFR of mid LAD lesion, normal LVEF. She was started on DAPT, Imdur and Lisinopril and discharged home. She returned to the ER a few days later on  with atypical chest pain, troponin downtrending and EKGs nonspecific. She was discharged home and presents for early follow up with her son and granddaughter. She denies recurrent symptoms of chest pain since her last ER visit. She denies any unusual exertional dyspnea, palpitations, syncope or heart failure symptoms. She has resumed some yardwork, however her family is concerned about her activity restrictions. She is complaint with medical regimen, does not check home BPs.       ROS:Pertinent positives as listed in the HPI.  All other systems reviewed and negative.    History reviewed. No pertinent past medical history.    Past Surgical History:   Procedure Laterality Date   • CARDIAC CATHETERIZATION N/A 2022    Procedure: LEFT HEART CATH;  Surgeon: Antonino Koo MD;  Location: [facility] CATH INVASIVE LOCATION;  Service: Cardiovascular;  Laterality: N/A;       Social History     Socioeconomic History   • Marital status:    Tobacco Use   • Smoking status: Never Smoker   • Smokeless tobacco: Never Used   Vaping Use  "  • Vaping Use: Never used   Substance and Sexual Activity   • Alcohol use: Never   • Drug use: Never   • Sexual activity: Defer       Family History   Problem Relation Age of Onset   • Breast cancer Maternal Aunt 60   • Ovarian cancer Daughter 46        Allergies   Allergen Reactions   • Pb-Hyoscy-Atropine-Scopolamine Anaphylaxis       Current Outpatient Medications   Medication Instructions   • aspirin 81 mg, Oral, Daily   • atorvastatin (LIPITOR) 20 mg, Oral, Nightly   • B Complex Vitamins (vitamin b complex) capsule capsule 1 capsule, Oral, Daily   • clopidogrel (PLAVIX) 75 mg, Oral, Daily   • diphenhydrAMINE (BENADRYL) 25 mg, Oral, 2 Times Daily PRN, pt reports taking this only at bedtime   • famotidine (PEPCID) 20 mg, Oral, 2 Times Daily   • fluticasone (FLONASE) 50 MCG/ACT nasal spray 2 sprays, Nasal, Daily   • gabapentin (NEURONTIN) 600 mg, Oral, Nightly   • isosorbide mononitrate (IMDUR) 30 mg, Oral, Every 24 Hours Scheduled   • lisinopril (PRINIVIL,ZESTRIL) 5 mg, Oral, Daily   • melatonin 10 mg, Oral   • nitroglycerin (NITROSTAT) 0.4 mg, Sublingual, Every 5 Minutes PRN, Take no more than 3 doses in 15 minutes.       Vitals:    06/23/22 1354   BP: 156/60   BP Location: Left arm   Patient Position: Sitting   Pulse: 62   SpO2: 95%   Weight: 69.4 kg (153 lb)   Height: 162.6 cm (64\")     Body mass index is 26.26 kg/m².    PHYSICAL EXAM:    General Appearance:   · well developed  · well nourished  Neck:  · thyroid not enlarged  · supple  Respiratory:  · no respiratory distress  · normal breath sounds  · no rales  Cardiovascular:  · no jugular venous distention  · regular rhythm  · apical impulse normal  · S1 normal, S2 normal  · no S3, no S4   · no murmur  · no rub, no thrill  · lower extremity edema: none    Skin:   warm, dry    RESULTS:   Procedures    Results for orders placed during the hospital encounter of 06/08/22    Adult Transthoracic Echo Complete W/ Cont if Necessary Per Protocol    Interpretation " Summary  · Left ventricular ejection fraction appears to be 61 - 65%. Left ventricular systolic function is normal.  · Left ventricular diastolic function was indeterminate.  · There is mild calcification of the aortic valve.  · Estimated right ventricular systolic pressure from tricuspid regurgitation is normal (<35 mmHg). Calculated right ventricular systolic pressure from tricuspid regurgitation is 26 mmHg.  · Left atrial volume is mildly increased.    Labs:  Lab Results   Component Value Date    CHOL 155 06/09/2022    TRIG 154 (H) 06/09/2022    HDL 37 (L) 06/09/2022    LDL 91 06/09/2022    AST 25 06/13/2022    ALT 21 06/13/2022     Lab Results   Component Value Date    HGBA1C 6.50 (H) 06/09/2022     Creatinine   Date Value Ref Range Status   06/13/2022 1.16 (H) 0.57 - 1.00 mg/dL Final   06/09/2022 0.90 0.57 - 1.00 mg/dL Final   06/08/2022 1.01 (H) 0.57 - 1.00 mg/dL Final     No results found for: EGFRIFNONA      ASSESSMENT:  Problem List Items Addressed This Visit        Cardiac and Vasculature    NSTEMI (non-ST elevated myocardial infarction) (HCC)    Relevant Medications    nitroglycerin (Nitrostat) 0.4 MG SL tablet    Other Relevant Orders    Ambulatory Referral to Cardiac Rehab    Coronary artery disease involving native coronary artery of native heart with unstable angina pectoris (HCC) - Primary    Overview     6/8/2022: LHC at [facility] for NSTEMI troponin 0.5.  Mid LAD 50 to 60%, IFR negative at 0.9, proximal and mid RCA tandem 30-40% stenosis, normal nondominant circumflex, LVEF 55 to 60%, LVEDP 20 mmHg.           Relevant Medications    nitroglycerin (Nitrostat) 0.4 MG SL tablet    Other Relevant Orders    Ambulatory Referral to Cardiac Rehab    Dyslipidemia, goal LDL below 70    Essential hypertension    Relevant Orders    Ambulatory Referral to Cardiac Rehab          PLAN:  1. Coronary artery disease  NSTEMI 6/8/22 with cardiac cath demonstrating moderate nonobstructive CAD, including iFR of mid LAD lesion    Normal LVEF  Continue ASA, Plavix, Imdur   HR too low for addition of BB at this time  She is active and asymptomatic   Continue current medical regimen, will refer to cardiac rehab  SL Nitro PRN prescribed and discussed use     2.   Hypertension   Goal <130/80mmHg   BP elevated today, discussed home monitoring   She will call us if her home BPs remain elevated >130mmHg consistently     3.  Dyslipidemia   LDL 91 on recent labs  Continue Lipitor 20mg         Follow-up   Return in about 4 months (around 10/23/2022) for with RDS.      Electronically signed by Natalie Way PA-C, 06/23/22, 4:04 PM EDT.

## 2022-06-23 ENCOUNTER — OFFICE VISIT (OUTPATIENT)
Dept: CARDIOLOGY | Facility: CLINIC | Age: 87
End: 2022-06-23

## 2022-06-23 VITALS
SYSTOLIC BLOOD PRESSURE: 156 MMHG | HEART RATE: 62 BPM | BODY MASS INDEX: 26.12 KG/M2 | DIASTOLIC BLOOD PRESSURE: 60 MMHG | WEIGHT: 153 LBS | HEIGHT: 64 IN | OXYGEN SATURATION: 95 %

## 2022-06-23 DIAGNOSIS — I21.4 NSTEMI (NON-ST ELEVATED MYOCARDIAL INFARCTION): ICD-10-CM

## 2022-06-23 DIAGNOSIS — E78.5 DYSLIPIDEMIA, GOAL LDL BELOW 70: ICD-10-CM

## 2022-06-23 DIAGNOSIS — I10 ESSENTIAL HYPERTENSION: ICD-10-CM

## 2022-06-23 DIAGNOSIS — I25.118 CORONARY ARTERY DISEASE OF NATIVE ARTERY OF NATIVE HEART WITH STABLE ANGINA PECTORIS: Primary | ICD-10-CM

## 2022-06-23 PROCEDURE — 99213 OFFICE O/P EST LOW 20 MIN: CPT | Performed by: PHYSICIAN ASSISTANT

## 2022-06-23 RX ORDER — NITROGLYCERIN 0.4 MG/1
0.4 TABLET SUBLINGUAL
Qty: 25 TABLET | Refills: 11 | Status: SHIPPED | OUTPATIENT
Start: 2022-06-23

## 2022-06-23 RX ORDER — FAMOTIDINE 20 MG/1
20 TABLET, FILM COATED ORAL 2 TIMES DAILY
COMMUNITY

## 2022-06-24 ENCOUNTER — HOME CARE VISIT (OUTPATIENT)
Dept: HOME HEALTH SERVICES | Facility: HOME HEALTHCARE | Age: 87
End: 2022-06-24

## 2022-06-24 PROCEDURE — G0299 HHS/HOSPICE OF RN EA 15 MIN: HCPCS

## 2022-06-26 VITALS
SYSTOLIC BLOOD PRESSURE: 161 MMHG | RESPIRATION RATE: 16 BRPM | DIASTOLIC BLOOD PRESSURE: 60 MMHG | OXYGEN SATURATION: 98 % | HEART RATE: 77 BPM | TEMPERATURE: 97.5 F

## 2022-06-29 ENCOUNTER — TELEPHONE (OUTPATIENT)
Dept: CARDIOLOGY | Facility: CLINIC | Age: 87
End: 2022-06-29

## 2022-06-29 ENCOUNTER — READMISSION MANAGEMENT (OUTPATIENT)
Dept: CALL CENTER | Facility: HOSPITAL | Age: 87
End: 2022-06-29

## 2022-06-29 NOTE — OUTREACH NOTE
AMI Week 3 Survey    Flowsheet Row Responses   [facility] [place] patient discharged from? [facility]   Does the patient have one of the following disease processes/diagnoses(primary or secondary)? Acute MI (STEMI,NSTEMI)   Week 3 attempt successful? Yes   Call start time 1028   Call end time 1053   Discharge diagnosis CARDIAC CATHETERIZATION  NSTEMI   Person spoke with today (if not patient) and relationship patient   Meds reviewed with patient/caregiver? Yes   Is the patient having any side effects they believe may be caused by any medication additions or changes? Yes   Side effects comments  Patient has developed hives over her body. She has a call into her doctor, and I encouraged her to be seen. She is using Benadryl cream for relief and that is helping some.    Does the patient have all prescriptions related to this admission filled (includes statins,anticoagulants,HTN meds,anti-arrhythmia meds) Yes   Is the patient taking all medications as directed (includes completed medication regime)? Yes   Does the patient have a primary care provider?  Yes   Does the patient have an appointment with their PCP,cardiologist,or clinic within 7 days of discharge? Yes   Has the patient kept scheduled appointments due by today? Yes   Has home health visited the patient within 72 hours of discharge? N/A   Did the patient receive a copy of their discharge instructions? Yes   Nursing interventions Reviewed instructions with patient   What is the patient's perception of their health status since discharge? New symptoms unrelated to diagnosis  [hives]   Nursing interventions Nurse provided patient education   Is the patient/caregiver able to teach back signs and symptoms of when to call for help immediately: Sudden chest discomfort, Sudden sweating or clammy skin, Irregular or rapid heart rate   Nursing interventions Nurse provided patient education   Is the pateint /caregiver able to teach back the importance of cardiac rehab? No    Is the patient/caregiver able to teach back lifestyle changes to help prevent MIs Maintaining a healthy weight, Regular exercise as approved by provider, Heart healthy diet, Reducing stress   Is the patient/caregiver able to teach back ways to prevent a second heart attack: Take medications, Participate in Cardiac Rehab, Follow up with MD, Manage risk factors   If the patient is a current smoker, are they able to teach back resources for cessation? Not a smoker   Is the patient/caregiver able to teach back the hierarchy of who to call/visit for symptoms/problems? PCP, Specialist, Home health nurse, Urgent Care, ED, 911 Yes   Week 3 call completed? Yes   Wrap up additional comments Patient has developed hives. She is in contact with her doctor. She would like to continue calls with ambulatory case management after our calls.            MELISSA CASEY - Registered Nurse

## 2022-06-29 NOTE — TELEPHONE ENCOUNTER
Pt calls to report possible allergic reaction to one of her new medications. She was started on clopidogrel 75 mg daily, atorvastatin 20 mg daily, lisinopril 5 mg daily, and isosorbide 30 mg daily at the time of her [facility] 6/8- moderate nonobstructive CAD.     Starting yesterday she developed itching, raised hives on chin, behind ears, groin, and one or two on her back and chest. Some are smaller but some are about approximately 1/2 inch, denies pain. Started using topical benadryl today- itching improved. Also noticed in the last few days white stools, denies abdominal pain. She is unsure which medication could be causing the hives. She read information pamphlets on each medication and rash is obviously listed as a possible side efect for each. Common side effect of lisinopril and clopidogrel is itching or a mild rash.     Advised the patient to continue benadryl cream and I will reach out to her tomorrow with RDS thoughts.

## 2022-06-30 NOTE — TELEPHONE ENCOUNTER
Spoke with pt regarding stopping lisinopril. She is concerned about her BP. She takes her BP everyday around noon, advised to continue to monitor at the same time and to call us in 2 weeks if BP greater than 140/90. Told her to f/u with her PCP pt agreeable and verbalized understanding.

## 2022-07-06 ENCOUNTER — READMISSION MANAGEMENT (OUTPATIENT)
Dept: CALL CENTER | Facility: HOSPITAL | Age: 87
End: 2022-07-06

## 2022-07-06 ENCOUNTER — DOCUMENTATION (OUTPATIENT)
Dept: CARDIAC REHAB | Facility: HOSPITAL | Age: 87
End: 2022-07-06

## 2022-07-06 NOTE — OUTREACH NOTE
AMI Week 4 Survey    Flowsheet Row Responses   [facility] patient discharged from? [place]   Does the patient have one of the following disease processes/diagnoses(primary or secondary)? Acute MI (STEMI,NSTEMI)   Week 4 attempt successful? Yes   Call start time 1648   Rescheduled Rescheduled-pt requested  [not at home, request call back]   Call end time 1650   Discharge diagnosis CARDIAC CATHETERIZATION  NSTEMI   Meds reviewed with patient/caregiver? Yes   Is the patient taking all medications as directed (includes completed medication regime)? Yes   Medication comments lisinopril gave her hives   Has the patient kept scheduled appointments due by today? Yes   Psychosocial issues? No   Comments Monitoring BP at home, doing well.           IVANNA LEON - Registered Nurse

## 2022-07-08 ENCOUNTER — READMISSION MANAGEMENT (OUTPATIENT)
Dept: CALL CENTER | Facility: HOSPITAL | Age: 87
End: 2022-07-08

## 2022-07-08 NOTE — OUTREACH NOTE
AMI Week 4 Survey    Flowsheet Row Responses   [facility] patient discharged from? [place]   Does the patient have one of the following disease processes/diagnoses(primary or secondary)? Acute MI (STEMI,NSTEMI)   Week 4 attempt successful? No   Rescheduled Revoked   Discharge diagnosis CARDIAC CATHETERIZATION  NSTEMI          CHRISTOPHER ARGUELLO - Registered Nurse

## 2022-07-13 ENCOUNTER — TELEPHONE (OUTPATIENT)
Dept: CARDIOLOGY | Facility: CLINIC | Age: 87
End: 2022-07-13

## 2022-07-13 NOTE — TELEPHONE ENCOUNTER
"Spoke with Doroteo at [facility] intake-  She said they accepted her on 6/16 and first visit was on 6/18. Someone told them the pt did not want to be considered \"home bound\"- due to medicare guidelines HH had to discharge pt.     Spoke with pt and she states no one told HH that she didn't want to be considered home bound and would like to be re-enrolled. New order placed.    Pt BP readings prior to and after stopping lisinopril 5 mg-    6/27 133/54 HR 66 repeat 129/56 HR 64  6/28 149/62 HR 57 repeat 131/60 HR 57  6/29 140/82 HR 62 repeat 134/54 HR 59  6/30 138/74 HR 52 repeat 131/49 HR 47  *stopped lisinopril due to rash, rash improved the same night*    7/4 169/60 HR 61 repeat 147/59  7/7 151/73 HR 56 repeat 141/58   7/8 142/67 HR 62 repeat 134/73 HR 62  7/9 145/77 repeat 130/53 HR 61  She takes her BP twice about a min or two in between on the same arm, advised to take it once.   Thoughts regarding BP?   "

## 2022-07-13 NOTE — TELEPHONE ENCOUNTER
Pt called to see if someone could call her once a week to check on her. There was a home health referral placed while she was in the hospital on 6/16 that is pending. Home health intake did not answer. I LVM with them to call me back with an update on the referral. Will await return call and update patient.

## 2022-07-14 NOTE — TELEPHONE ENCOUNTER
Spoke with pt regarding blood pressures and RDS recommendations, no further questions at this time.

## 2022-08-02 ENCOUNTER — TELEPHONE (OUTPATIENT)
Dept: CARDIOLOGY | Facility: CLINIC | Age: 87
End: 2022-08-02

## 2022-08-02 NOTE — TELEPHONE ENCOUNTER
Pt called to ask if she is supposed to take Imdur 30 mg daily, advised this was continued at her LOV with Natalie in June. She asked if she is to continue a multi vitamin, advised this is acceptable. No further questions at this time.

## 2022-09-12 ENCOUNTER — TELEPHONE (OUTPATIENT)
Dept: CARDIOLOGY | Facility: CLINIC | Age: 87
End: 2022-09-12

## 2022-09-12 NOTE — TELEPHONE ENCOUNTER
Pt called with questions about Plavix and Imdur. She doesn't remember what the questions were, she has them wrote down at home and she was out running errands when we spoke. She states she went to [facility] after she fell and injured her shin. She states they told her she needed an EKG but she didn't know why. She denies any chest pain or other cardiac symptoms at this time. She is going to call me in the morning to go over additional questions. Pt agreeable and verbalized understanding.

## 2022-09-14 ENCOUNTER — TELEPHONE (OUTPATIENT)
Dept: CARDIOLOGY | Facility: CLINIC | Age: 87
End: 2022-09-14

## 2022-09-14 NOTE — TELEPHONE ENCOUNTER
Pt returned my call today- states she doesn't know why she needs an EKG that [facility] recommended. She declined EKG in their office due to cost was $200. I have called their office for records, they are unsure if they're allowed to send records and do not have a medical records dept there. I faxed cover letterhead and MA will call me if they are not allowed to send.     She endorses recent fatigue and stomach upset x3 days, denies other cardiac symptoms. She saw PCP for this yesterday, no labs ordered or changes made at that visit. She reports some BP readings 9/13 122/70 HR 64, 8/30 132/64, and 160/67 *repeat check 2 to 3 min later was 146/68. Checks BP around noon after medications. It was recommended to stop lisinopril due to possible allergy itching and hives. Still taking Imdur 60 mg daily. She notes BP machine is approximately 4 years old and would like this checked against our manual BP. I offered for her to come in for walk-in EKG and BP check, she would still like to have an appointment. Offered appt with Natalie pt agreeable. Msg sent to Brandy to schedule. Advised pt to bring in her cuff and BP readings to her appt. Pt verbalized understanding.

## 2022-09-20 ENCOUNTER — TELEPHONE (OUTPATIENT)
Dept: CARDIOLOGY | Facility: CLINIC | Age: 87
End: 2022-09-20

## 2022-09-20 NOTE — TELEPHONE ENCOUNTER
Pt LVM asking if she's supposed to continue Imdur 60 mg daily. Returned her call, no answer LVM stating to continue and we will see her at her appt on Thursday.

## 2022-09-21 PROBLEM — I25.10 CORONARY ARTERY DISEASE INVOLVING NATIVE CORONARY ARTERY OF NATIVE HEART WITHOUT ANGINA PECTORIS: Status: ACTIVE | Noted: 2022-06-08

## 2022-09-21 NOTE — PROGRESS NOTES
OFFICE VISIT  NOTE  [facility] CARDIOLOGY MAIN CAMPUS      Name: Monique Betts    Date: 2022  MRN:  [id number]  :  1935      REFERRING/PRIMARY PROVIDER:  Sena Lomeli MD     Chief Complaint   Patient presents with   • Fatigue   • Coronary artery disease        HPI: Monique Betts is an 87 y.o. female who presents today for follow up of CAD, hypertension. Had NSTEMI 2022, presented with midsternal chest tightness with activity, troponins slightly elevated to max 0.7, EKGs with nonspecific findings. Cardiac cath demonstrated moderate nonobstructive CAD with normal iFR of mid LAD lesion, normal LVEF. She was started on DAPT, Imdur and Lisinopril and discharged home. She has resumed some yardwork, however her family is concerned about her activity restrictions. Lisinopril was discontinued since last visit due to concerns for allergic reaction with hives.  She presents for earlier visit due to concerns of labile blood pressures. She forgot to bring in her home BP log, but she reports occasional readings of -150s. She also has some readings in 120s. She does not have any associated symptoms, and specifically has not had any angina, unusual dyspnea, or palpitations. She denies syncope or pre-syncope but does occasionally get dizzy at random times. She denies recent LE edema.     ROS:Pertinent positives as listed in the HPI.  All other systems reviewed and negative.    History reviewed. No pertinent past medical history.    Past Surgical History:   Procedure Laterality Date   • CARDIAC CATHETERIZATION N/A 2022    Procedure: LEFT HEART CATH;  Surgeon: Antonino Koo MD;  Location: [facility] CATH INVASIVE LOCATION;  Service: Cardiovascular;  Laterality: N/A;       Social History     Socioeconomic History   • Marital status:    Tobacco Use   • Smoking status: Never Smoker   • Smokeless tobacco: Never Used   Vaping Use   • Vaping Use: Never used   Substance and  "Sexual Activity   • Alcohol use: Never   • Drug use: Never   • Sexual activity: Defer       Family History   Problem Relation Age of Onset   • Breast cancer Maternal Aunt 60   • Ovarian cancer Daughter 46        Allergies   Allergen Reactions   • Pb-Hyoscy-Atropine-Scopolamine Anaphylaxis       Current Outpatient Medications   Medication Instructions   • aspirin 81 mg, Oral, Daily   • atorvastatin (LIPITOR) 20 mg, Oral, Nightly   • B Complex Vitamins (vitamin b complex) capsule capsule 1 capsule, Oral, Daily   • clopidogrel (PLAVIX) 75 mg, Oral, Daily   • diphenhydrAMINE (BENADRYL) 25 mg, Oral, 2 Times Daily PRN, pt reports taking this only at bedtime   • famotidine (PEPCID) 20 mg, Oral, 2 Times Daily   • fluticasone (FLONASE) 50 MCG/ACT nasal spray 2 sprays, Nasal, Daily   • gabapentin (NEURONTIN) 600 mg, Oral, Nightly   • isosorbide mononitrate (IMDUR) 30 mg, Oral, Every 24 Hours Scheduled   • melatonin 10 mg, Oral, Nightly   • nitroglycerin (NITROSTAT) 0.4 mg, Sublingual, Every 5 Minutes PRN, Take no more than 3 doses in 15 minutes.       Vitals:    09/22/22 1312   BP: 132/76   BP Location: Left arm   Patient Position: Sitting   Pulse: 60   SpO2: 96%   Weight: 66.4 kg (146 lb 6.4 oz)   Height: 162.6 cm (64\")     Body mass index is 25.13 kg/m².    PHYSICAL EXAM:    General Appearance:   · well developed  · well nourished  Neck:  · thyroid not enlarged  · supple  Respiratory:  · no respiratory distress  · normal breath sounds  · no rales  Cardiovascular:  · no jugular venous distention  · regular rhythm  · apical impulse normal  · S1 normal, S2 normal  · no S3, no S4   · no murmur  · no rub, no thrill  · lower extremity edema: none    Skin:   warm, dry    RESULTS:     ECG 12 Lead    Date/Time: 9/22/2022 2:04 PM  Performed by: Natalie Way PA-C  Authorized by: Natalie Way PA-C   Comparison: compared with previous ECG from 6/13/2022  Similar to previous ECG  Rhythm: sinus rhythm  Rate: " normal  BPM: 60  Other findings: non-specific ST-T wave changes and low voltage    Clinical impression: abnormal EKG          Results for orders placed during the hospital encounter of 06/08/22    Adult Transthoracic Echo Complete W/ Cont if Necessary Per Protocol    Interpretation Summary  · Left ventricular ejection fraction appears to be 61 - 65%. Left ventricular systolic function is normal.  · Left ventricular diastolic function was indeterminate.  · There is mild calcification of the aortic valve.  · Estimated right ventricular systolic pressure from tricuspid regurgitation is normal (<35 mmHg). Calculated right ventricular systolic pressure from tricuspid regurgitation is 26 mmHg.  · Left atrial volume is mildly increased.        Labs:  Lab Results   Component Value Date    CHOL 155 06/09/2022    TRIG 154 (H) 06/09/2022    HDL 37 (L) 06/09/2022    LDL 91 06/09/2022    AST 25 06/13/2022    ALT 21 06/13/2022     Lab Results   Component Value Date    HGBA1C 6.50 (H) 06/09/2022     Creatinine   Date Value Ref Range Status   06/13/2022 1.16 (H) 0.57 - 1.00 mg/dL Final   06/09/2022 0.90 0.57 - 1.00 mg/dL Final   06/08/2022 1.01 (H) 0.57 - 1.00 mg/dL Final     No results found for: EGFRIFNONA      ASSESSMENT:  Problem List Items Addressed This Visit        Cardiac and Vasculature    Coronary artery disease involving native coronary artery of native heart without angina pectoris - Primary    Overview     6/8/2022: LHC at [facility] for NSTEMI troponin 0.5.  Mid LAD 50 to 60%, IFR negative at 0.9, proximal and mid RCA tandem 30-40% stenosis, normal nondominant circumflex, LVEF 55 to 60%, LVEDP 20 mmHg.         Dyslipidemia, goal LDL below 70    Essential hypertension          PLAN:  1. Coronary artery disease  NSTEMI 6/8/22 with cardiac cath demonstrating moderate nonobstructive CAD, including iFR of mid LAD lesion   Normal LVEF  Continue ASA, Plavix, Imdur   HR too low for addition of BB at this time    2.   Hypertension    Goal <140/90mmHg   BP at target today, discussed that occasional readings of -150s mmHg are acceptable as long as most SBPs are <140 mmHg   Also advised cutting back on caffeine intake and increasing water intake to at least 64 ounces of water  Discussed how to take home BPs and to call us for any persistent high readings or concerning symptoms     3.  Dyslipidemia   LDL 91 on recent labs  Continue Lipitor 20mg         Advance Care Planning   ACP discussion was held with the patient during this visit. Patient does not have an advance directive, declines further assistance.          Follow-up   Return in about 6 months (around 3/22/2023) for with RDS.      Electronically signed by Natalie Way PA-C, 09/22/22, 2:04 PM EDT.

## 2022-09-22 ENCOUNTER — OFFICE VISIT (OUTPATIENT)
Dept: CARDIOLOGY | Facility: CLINIC | Age: 87
End: 2022-09-22

## 2022-09-22 VITALS
WEIGHT: 146.4 LBS | DIASTOLIC BLOOD PRESSURE: 76 MMHG | OXYGEN SATURATION: 96 % | HEIGHT: 64 IN | HEART RATE: 60 BPM | SYSTOLIC BLOOD PRESSURE: 132 MMHG | BODY MASS INDEX: 25 KG/M2

## 2022-09-22 DIAGNOSIS — E78.5 DYSLIPIDEMIA, GOAL LDL BELOW 70: ICD-10-CM

## 2022-09-22 DIAGNOSIS — I25.10 CORONARY ARTERY DISEASE INVOLVING NATIVE CORONARY ARTERY OF NATIVE HEART WITHOUT ANGINA PECTORIS: Primary | ICD-10-CM

## 2022-09-22 DIAGNOSIS — I10 ESSENTIAL HYPERTENSION: ICD-10-CM

## 2022-09-22 PROCEDURE — 99213 OFFICE O/P EST LOW 20 MIN: CPT | Performed by: PHYSICIAN ASSISTANT

## 2022-09-22 PROCEDURE — 93000 ELECTROCARDIOGRAM COMPLETE: CPT | Performed by: PHYSICIAN ASSISTANT

## 2022-12-12 RX ORDER — ISOSORBIDE MONONITRATE 30 MG/1
TABLET, EXTENDED RELEASE ORAL
Qty: 30 TABLET | Refills: 5 | Status: SHIPPED | OUTPATIENT
Start: 2022-12-12

## 2022-12-21 ENCOUNTER — TELEPHONE (OUTPATIENT)
Dept: CARDIOLOGY | Facility: CLINIC | Age: 87
End: 2022-12-21

## 2022-12-21 NOTE — TELEPHONE ENCOUNTER
Pt wanted a sooner appt to discuss stopping Plavix. She has mentioned wanting to stop it several times to me during past telephone calls. She says she bruises really bad. I explained risk vs. Benefit of Plavix after NSTEMI [facility] on 6/8/22- mid LAD 50-60% IFR 0.90, proximal mid RCA tandem 30-40%, normal circ. Please advise regarding DAPT?

## 2022-12-31 ENCOUNTER — HOSPITAL ENCOUNTER (EMERGENCY)
Facility: HOSPITAL | Age: 87
Discharge: HOME | End: 2022-12-31
Attending: EMERGENCY MEDICINE | Admitting: EMERGENCY MEDICINE
Payer: MEDICARE

## 2022-12-31 VITALS
WEIGHT: 145 LBS | DIASTOLIC BLOOD PRESSURE: 71 MMHG | SYSTOLIC BLOOD PRESSURE: 153 MMHG | OXYGEN SATURATION: 95 % | HEART RATE: 59 BPM | TEMPERATURE: 98.4 F | RESPIRATION RATE: 18 BRPM | HEIGHT: 64 IN | BODY MASS INDEX: 24.75 KG/M2

## 2022-12-31 DIAGNOSIS — J02.9 PHARYNGITIS, UNSPECIFIED ETIOLOGY: Primary | ICD-10-CM

## 2022-12-31 LAB
B PARAPERT DNA SPEC QL NAA+PROBE: NOT DETECTED
B PERT DNA SPEC QL NAA+PROBE: NOT DETECTED
C PNEUM DNA NPH QL NAA+NON-PROBE: NOT DETECTED
FLUAV SUBTYP SPEC NAA+PROBE: NOT DETECTED
FLUBV RNA ISLT QL NAA+PROBE: NOT DETECTED
HADV DNA SPEC NAA+PROBE: NOT DETECTED
HCOV 229E RNA SPEC QL NAA+PROBE: NOT DETECTED
HCOV HKU1 RNA SPEC QL NAA+PROBE: NOT DETECTED
HCOV NL63 RNA SPEC QL NAA+PROBE: NOT DETECTED
HCOV OC43 RNA SPEC QL NAA+PROBE: NOT DETECTED
HMPV RNA NPH QL NAA+NON-PROBE: NOT DETECTED
HPIV1 RNA ISLT QL NAA+PROBE: NOT DETECTED
HPIV2 RNA SPEC QL NAA+PROBE: NOT DETECTED
HPIV3 RNA NPH QL NAA+PROBE: NOT DETECTED
HPIV4 P GENE NPH QL NAA+PROBE: NOT DETECTED
M PNEUMO IGG SER IA-ACNC: NOT DETECTED
RHINOVIRUS RNA SPEC NAA+PROBE: NOT DETECTED
RSV RNA NPH QL NAA+NON-PROBE: NOT DETECTED
S PYO AG THROAT QL: NEGATIVE
SARS-COV-2 RNA NPH QL NAA+NON-PROBE: NOT DETECTED

## 2022-12-31 PROCEDURE — 0202U NFCT DS 22 TRGT SARS-COV-2: CPT | Performed by: NURSE PRACTITIONER

## 2022-12-31 PROCEDURE — 99283 EMERGENCY DEPT VISIT LOW MDM: CPT

## 2022-12-31 PROCEDURE — 87880 STREP A ASSAY W/OPTIC: CPT | Performed by: NURSE PRACTITIONER

## 2022-12-31 PROCEDURE — 87081 CULTURE SCREEN ONLY: CPT | Performed by: NURSE PRACTITIONER

## 2022-12-31 RX ORDER — AZITHROMYCIN 250 MG/1
TABLET, FILM COATED ORAL
Qty: 6 TABLET | Refills: 0 | Status: SHIPPED | OUTPATIENT
Start: 2022-12-31

## 2022-12-31 RX ORDER — MAG HYDROX/ALUMINUM HYD/SIMETH 400-400-40
1 SUSPENSION, ORAL (FINAL DOSE FORM) ORAL AS NEEDED
Qty: 12 EACH | Refills: 0 | Status: SHIPPED | OUTPATIENT
Start: 2022-12-31

## 2022-12-31 RX ORDER — BISACODYL 10 MG
10 SUPPOSITORY, RECTAL RECTAL DAILY
Qty: 12 EACH | Refills: 0 | Status: SHIPPED | OUTPATIENT
Start: 2022-12-31

## 2022-12-31 NOTE — DISCHARGE INSTRUCTIONS
Home to rest.  Maintain your very best hydration and nutrition.  Warm salt water gargles can be helpful.  Start your antibiotics as directed: Do not take these antibiotics on an empty stomach but rather with ample food and fluids.  Follow-up with Dr. Sena Lomeli to monitor your recovery.  Thank you

## 2023-01-02 LAB — BACTERIA SPEC AEROBE CULT: NORMAL

## 2023-01-02 NOTE — ED PROVIDER NOTES
EMERGENCY DEPARTMENT ENCOUNTER    Pt Name: Monique Betts  MRN: [id number]  Pt :   1935  Room Number:  RW2/R2  Date of encounter:  2022  PCP: Sena Lomeli MD  ED Provider: GRACY Noriega    Historian: Patient      HPI:  Chief Complaint: Sore throat        Context: Monique Betts is a 87 y.o. female who presents to the ED c/o continued sore throat for approximately 5 days.  Patient has been seen by her primary care provider for this discomfort and she is concerned with the absence of improvement after administration of Tessalon Perles and a decongestant.  She has mild cough but no shortness of breath.    Review of systems is negative for fever or chills.  Negative for chest pain or shortness of breath.  Positive for cough without sputum production.  No hemoptysis.  No foreign body sensation to the throat or local lymphadenopathy.  She has no difficulty swallowing.  GI and  systems except for longstanding difficulty with evacuating when she gets urge to defecate.  She requests some suppositories to help her with this routinely.  No profound weakness, dizziness or syncope.  No neurosensory complaint or focal weakness      PAST MEDICAL HISTORY  No past medical history on file.      PAST SURGICAL HISTORY  Past Surgical History:   Procedure Laterality Date   • CARDIAC CATHETERIZATION N/A 2022    Procedure: LEFT HEART CATH;  Surgeon: Antonino Koo MD;  Location: [facility] INVASIVE LOCATION;  Service: Cardiovascular;  Laterality: N/A;         FAMILY HISTORY  Family History   Problem Relation Age of Onset   • Breast cancer Maternal Aunt 60   • Ovarian cancer Daughter 46         SOCIAL HISTORY  Social History     Socioeconomic History   • Marital status:    Tobacco Use   • Smoking status: Never   • Smokeless tobacco: Never   Vaping Use   • Vaping Use: Never used   Substance and Sexual Activity   • Alcohol use: Never   • Drug use: Never   • Sexual activity: Defer          ALLERGIES  Pb-hyoscy-atropine-scopolamine        REVIEW OF SYSTEMS  Review of Systems     All systems reviewed and negative except for those discussed in HPI.       PHYSICAL EXAM    I have reviewed the triage vital signs and nursing notes.    ED Triage Vitals [12/31/22 1235]   Temp Heart Rate Resp BP SpO2   98.4 °F (36.9 °C) 59 18 153/71 95 %      Temp src Heart Rate Source Patient Position BP Location FiO2 (%)   -- -- -- -- --       Physical Exam  GENERAL:   Appears in no acute distress.  Her vital signs are normal.  She is a very good historian  HENT: Nares patent.  Posterior pharynx is benign without petechiae or exudate or edema of the tonsillar pillars.  No local lymphadenopathy.  Tympanic membranes are well visualized and benign.   EYES: No scleral icterus.  CV: Regular rhythm, regular rate.  RESPIRATORY: Normal effort.  No audible wheezes, rales or rhonchi.  She has no stridor.  ABDOMEN: Soft, nontender  MUSCULOSKELETAL: No deformities.   NEURO: Alert, moves all extremities, follows commands.  SKIN: Warm, dry, no rash visualized.      LAB RESULTS  No results found for this or any previous visit (from the past 24 hour(s)).    If labs were ordered, I independently reviewed the results and considered them in treating the patient.        RADIOLOGY  No Radiology Exams Resulted Within Past 24 Hours      PROCEDURES    Procedures    No orders to display       MEDICATIONS GIVEN IN ER    Medications - No data to display      MEDICAL DECISION MAKING, PROGRESS, and CONSULTS    All labs have been independently reviewed by me.  All radiology studies have been reviewed by me and the radiologist dictating the report.  All EKG's have been independently viewed and interpreted by me.          Orders placed during this visit:  Orders Placed This Encounter   Procedures   • Respiratory Panel PCR w/COVID-19(SARS-CoV-2) RISHI/KYLEE/GLADYS/PAD/COR/MAD/[facility] In-House, NP Swab in UTM/VTM, 3-4 HR TAT - Swab, Nasopharynx   • Rapid Strep A  Screen - Swab, Throat   • Beta Strep Culture, Throat - Swab, Throat         Additional orders considered but not ordered:    ED Course:    Consultants:      ED Course as of 01/02/23 1457   Sat Dec 31, 2022   1425 Patient's work-up is remarkable for not detection of all the viral studies on respiratory panel.  Strep swab is negative. [MS]      ED Course User Index  [MS] Kenzie Mulligan, GRACY                  AS OF 14:57 EST VITALS:    BP - 153/71  HR - 59  TEMP - 98.4 °F (36.9 °C)  O2 SATS - 95%                  DIAGNOSIS  Final diagnoses:   Pharyngitis, unspecified etiology         DISPOSITION    DISCHARGE    Patient discharged in stable condition.    Reviewed implications of results, diagnosis, meds, responsibility to follow up, warning signs and symptoms of possible worsening, potential complications and reasons to return to ER.    Patient/Family voiced understanding of above instructions.    Discussed plan for discharge, as there is no emergent indication for admission.  Pt/family is agreeable and understands need for follow up and possible repeat testing.  Pt/family is aware that discharge does not mean that nothing is wrong but that it indicates no emergency is currently present that requires admission and they must continue care with follow-up as given below or with a physician of their choice.     FOLLOW-UP  Sena Lomeli MD  [facility] [address] [phone]    Schedule an appointment as soon as possible for a visit in 2 days  If symptoms worsen         Medication List      New Prescriptions    azithromycin 250 MG tablet  Commonly known as: ZITHROMAX  Take 2 tablets the first day, then 1 tablet daily for 4 days.     bisacodyl 10 MG suppository  Commonly known as: DULCOLAX  Insert 1 suppository into the rectum Daily.     glycerin adult 2 g suppository  Insert 1 suppository into the rectum As Needed for Constipation.           Where to Get Your Medications      These medications were  sent to [facility] [address] [place] [address] - [phone]  - [phone]   [address] [place] [address]    Phone: [phone]   · azithromycin 250 MG tablet  · bisacodyl 10 MG suppository  · glycerin adult 2 g suppository             Please note that portions of this document were completed with voice recognition software.      Kenzie Mulligan, APRN  01/02/23 1451

## 2023-01-03 ENCOUNTER — TELEPHONE (OUTPATIENT)
Dept: GASTROENTEROLOGY | Facility: CLINIC | Age: 88
End: 2023-01-03
Payer: MEDICARE

## 2023-01-03 NOTE — TELEPHONE ENCOUNTER
Dr Hernandes,  Ms Alpesh called and stated she got Colonoscopy recall in the mail for this month. Dr Lomeli don't think she need to get colonoscopy due to her age/health. Patient states she has had precancerous polyps in the past and she thinks she needs to go ahead and get colonoscopy. Please advise. Thanks

## 2023-03-24 ENCOUNTER — TRANSCRIBE ORDERS (OUTPATIENT)
Dept: ADMINISTRATIVE | Facility: HOSPITAL | Age: 88
End: 2023-03-24
Payer: MEDICARE

## 2023-03-24 DIAGNOSIS — Z12.31 SCREENING MAMMOGRAM FOR BREAST CANCER: Primary | ICD-10-CM

## 2023-04-23 ENCOUNTER — APPOINTMENT (OUTPATIENT)
Dept: CT IMAGING | Facility: HOSPITAL | Age: 88
End: 2023-04-23
Payer: MEDICARE

## 2023-04-23 ENCOUNTER — HOSPITAL ENCOUNTER (EMERGENCY)
Facility: HOSPITAL | Age: 88
Discharge: HOME | End: 2023-04-23
Attending: EMERGENCY MEDICINE | Admitting: EMERGENCY MEDICINE
Payer: MEDICARE

## 2023-04-23 VITALS
HEIGHT: 64 IN | WEIGHT: 141 LBS | HEART RATE: 60 BPM | SYSTOLIC BLOOD PRESSURE: 148 MMHG | TEMPERATURE: 97.7 F | OXYGEN SATURATION: 97 % | BODY MASS INDEX: 24.07 KG/M2 | DIASTOLIC BLOOD PRESSURE: 74 MMHG | RESPIRATION RATE: 18 BRPM

## 2023-04-23 DIAGNOSIS — R10.84 ACUTE GENERALIZED ABDOMINAL PAIN: Primary | ICD-10-CM

## 2023-04-23 DIAGNOSIS — R11.2 NAUSEA AND VOMITING, UNSPECIFIED VOMITING TYPE: ICD-10-CM

## 2023-04-23 DIAGNOSIS — R19.7 DIARRHEA, UNSPECIFIED TYPE: ICD-10-CM

## 2023-04-23 LAB
ALBUMIN SERPL-MCNC: 4.3 G/DL (ref 3.5–5.2)
ALBUMIN/GLOB SERPL: 1.6 G/DL
ALP SERPL-CCNC: 70 U/L (ref 39–117)
ALT SERPL W P-5'-P-CCNC: 21 U/L (ref 1–33)
ANION GAP SERPL CALCULATED.3IONS-SCNC: 12 MMOL/L (ref 5–15)
AST SERPL-CCNC: 23 U/L (ref 1–32)
BACTERIA UR QL AUTO: ABNORMAL /HPF
BASOPHILS # BLD AUTO: 0.02 10*3/MM3 (ref 0–0.2)
BASOPHILS NFR BLD AUTO: 0.3 % (ref 0–1.5)
BILIRUB SERPL-MCNC: 0.4 MG/DL (ref 0–1.2)
BILIRUB UR QL STRIP: NEGATIVE
BUN SERPL-MCNC: 12 MG/DL (ref 8–23)
BUN/CREAT SERPL: 13.5 (ref 7–25)
CALCIUM SPEC-SCNC: 9.6 MG/DL (ref 8.6–10.5)
CHLORIDE SERPL-SCNC: 105 MMOL/L (ref 98–107)
CLARITY UR: ABNORMAL
CO2 SERPL-SCNC: 23 MMOL/L (ref 22–29)
COLOR UR: YELLOW
CREAT SERPL-MCNC: 0.89 MG/DL (ref 0.57–1)
DEPRECATED RDW RBC AUTO: 45.7 FL (ref 37–54)
EGFRCR SERPLBLD CKD-EPI 2021: 62.8 ML/MIN/1.73
EOSINOPHIL # BLD AUTO: 0.05 10*3/MM3 (ref 0–0.4)
EOSINOPHIL NFR BLD AUTO: 0.9 % (ref 0.3–6.2)
ERYTHROCYTE [DISTWIDTH] IN BLOOD BY AUTOMATED COUNT: 12.9 % (ref 12.3–15.4)
FLUAV RNA RESP QL NAA+PROBE: NOT DETECTED
FLUBV RNA RESP QL NAA+PROBE: NOT DETECTED
GLOBULIN UR ELPH-MCNC: 2.7 GM/DL
GLUCOSE SERPL-MCNC: 124 MG/DL (ref 65–99)
GLUCOSE UR STRIP-MCNC: NEGATIVE MG/DL
HCT VFR BLD AUTO: 44.3 % (ref 34–46.6)
HGB BLD-MCNC: 14.5 G/DL (ref 12–15.9)
HGB UR QL STRIP.AUTO: NEGATIVE
HYALINE CASTS UR QL AUTO: ABNORMAL /LPF
IMM GRANULOCYTES # BLD AUTO: 0.01 10*3/MM3 (ref 0–0.05)
IMM GRANULOCYTES NFR BLD AUTO: 0.2 % (ref 0–0.5)
KETONES UR QL STRIP: ABNORMAL
LEUKOCYTE ESTERASE UR QL STRIP.AUTO: ABNORMAL
LIPASE SERPL-CCNC: 32 U/L (ref 13–60)
LYMPHOCYTES # BLD AUTO: 2.05 10*3/MM3 (ref 0.7–3.1)
LYMPHOCYTES NFR BLD AUTO: 35 % (ref 19.6–45.3)
MCH RBC QN AUTO: 31.5 PG (ref 26.6–33)
MCHC RBC AUTO-ENTMCNC: 32.7 G/DL (ref 31.5–35.7)
MCV RBC AUTO: 96.3 FL (ref 79–97)
MONOCYTES # BLD AUTO: 0.5 10*3/MM3 (ref 0.1–0.9)
MONOCYTES NFR BLD AUTO: 8.5 % (ref 5–12)
NEUTROPHILS NFR BLD AUTO: 3.22 10*3/MM3 (ref 1.7–7)
NEUTROPHILS NFR BLD AUTO: 55.1 % (ref 42.7–76)
NITRITE UR QL STRIP: NEGATIVE
NRBC BLD AUTO-RTO: 0 /100 WBC (ref 0–0.2)
PH UR STRIP.AUTO: 6 [PH] (ref 5–8)
PLATELET # BLD AUTO: 187 10*3/MM3 (ref 140–450)
PMV BLD AUTO: 11 FL (ref 6–12)
POTASSIUM SERPL-SCNC: 3.8 MMOL/L (ref 3.5–5.2)
PROT SERPL-MCNC: 7 G/DL (ref 6–8.5)
PROT UR QL STRIP: NEGATIVE
RBC # BLD AUTO: 4.6 10*6/MM3 (ref 3.77–5.28)
RBC # UR STRIP: ABNORMAL /HPF
REF LAB TEST METHOD: ABNORMAL
SARS-COV-2 RNA RESP QL NAA+PROBE: NOT DETECTED
SODIUM SERPL-SCNC: 140 MMOL/L (ref 136–145)
SP GR UR STRIP: 1.02 (ref 1–1.03)
SQUAMOUS #/AREA URNS HPF: ABNORMAL /HPF
UROBILINOGEN UR QL STRIP: ABNORMAL
WBC # UR STRIP: ABNORMAL /HPF
WBC NRBC COR # BLD: 5.85 10*3/MM3 (ref 3.4–10.8)

## 2023-04-23 PROCEDURE — 80053 COMPREHEN METABOLIC PANEL: CPT | Performed by: EMERGENCY MEDICINE

## 2023-04-23 PROCEDURE — 99283 EMERGENCY DEPT VISIT LOW MDM: CPT

## 2023-04-23 PROCEDURE — 25510000001 IOPAMIDOL 61 % SOLUTION: Performed by: EMERGENCY MEDICINE

## 2023-04-23 PROCEDURE — 96374 THER/PROPH/DIAG INJ IV PUSH: CPT

## 2023-04-23 PROCEDURE — 85025 COMPLETE CBC W/AUTO DIFF WBC: CPT | Performed by: EMERGENCY MEDICINE

## 2023-04-23 PROCEDURE — 83690 ASSAY OF LIPASE: CPT | Performed by: EMERGENCY MEDICINE

## 2023-04-23 PROCEDURE — 25010000002 ONDANSETRON PER 1 MG: Performed by: EMERGENCY MEDICINE

## 2023-04-23 PROCEDURE — 74177 CT ABD & PELVIS W/CONTRAST: CPT

## 2023-04-23 PROCEDURE — 36415 COLL VENOUS BLD VENIPUNCTURE: CPT

## 2023-04-23 PROCEDURE — 81001 URINALYSIS AUTO W/SCOPE: CPT | Performed by: EMERGENCY MEDICINE

## 2023-04-23 PROCEDURE — 87636 SARSCOV2 & INF A&B AMP PRB: CPT | Performed by: EMERGENCY MEDICINE

## 2023-04-23 RX ORDER — ONDANSETRON 4 MG/1
4 TABLET, ORALLY DISINTEGRATING ORAL EVERY 6 HOURS PRN
Qty: 12 TABLET | Refills: 0 | Status: SHIPPED | OUTPATIENT
Start: 2023-04-23

## 2023-04-23 RX ORDER — ONDANSETRON 2 MG/ML
4 INJECTION INTRAMUSCULAR; INTRAVENOUS ONCE
Status: COMPLETED | OUTPATIENT
Start: 2023-04-23 | End: 2023-04-23

## 2023-04-23 RX ADMIN — ONDANSETRON 4 MG: 2 INJECTION INTRAMUSCULAR; INTRAVENOUS at 21:19

## 2023-04-23 RX ADMIN — IOPAMIDOL 100 ML: 612 INJECTION, SOLUTION INTRAVENOUS at 21:56

## 2023-04-23 RX ADMIN — SODIUM CHLORIDE 1000 ML: 9 INJECTION, SOLUTION INTRAVENOUS at 21:18

## 2023-04-24 NOTE — ED PROVIDER NOTES
[place]    EMERGENCY DEPARTMENT ENCOUNTER      Pt Name: Monique Betts  MRN: [id number]  YOB: 1935  Date of evaluation: 4/23/2023  Provider: Sudheer aSnderson MD    CHIEF COMPLAINT       Chief Complaint   Patient presents with   • Vomiting   • Nausea   • Abdominal Pain   • Diarrhea         HISTORY OF PRESENT ILLNESS   Monique Betts is a 88 y.o. female who presents to the emergency department with 2 days of intermittent cramping generalized abdominal pain along with watery vomiting and diarrhea.  Patient states that she just returned home from vacation in [facility].  She knows of no known sick contacts.  She did have some chills earlier at home but denies any fever, urinary symptoms, chest pain, or shortness of breath.  She states that she ate some cream of wheat earlier but otherwise has had difficulty eating secondary to nausea.      Nursing notes were reviewed.    REVIEW OF SYSTEMS     ROS:  A chief complaint appropriate review of systems was completed and is negative except as noted in the HPI.      PAST MEDICAL HISTORY   None    SURGICAL HISTORY       Past Surgical History:   Procedure Laterality Date   • CARDIAC CATHETERIZATION N/A 6/8/2022    Procedure: LEFT HEART CATH;  Surgeon: Antonino Koo MD;  [facility];  Service: Cardiovascular;  Laterality: N/A;         CURRENT MEDICATIONS     No current facility-administered medications for this encounter.    Current Outpatient Medications:   •  aspirin 81 MG chewable tablet, Chew 81 mg Daily., Disp: , Rfl:   •  atorvastatin (LIPITOR) 20 MG tablet, Take 1 tablet by mouth Every Night., Disp: 30 tablet, Rfl: 11  •  azithromycin (ZITHROMAX) 250 MG tablet, Take 2 tablets the first day, then 1 tablet daily for 4 days., Disp: 6 tablet, Rfl: 0  •  B Complex Vitamins (vitamin b complex) capsule capsule, Take 1 capsule by mouth Daily., Disp: , Rfl:   •  bisacodyl (DULCOLAX) 10 MG suppository, Insert 1 suppository into the  rectum Daily., Disp: 12 each, Rfl: 0  •  diphenhydrAMINE (BENADRYL) 25 MG tablet, Take 25 mg by mouth 2 (Two) Times a Day As Needed for Sleep. pt reports taking this only at bedtime, Disp: , Rfl:   •  famotidine (PEPCID) 20 MG tablet, Take 20 mg by mouth 2 (Two) Times a Day., Disp: , Rfl:   •  fluticasone (FLONASE) 50 MCG/ACT nasal spray, 2 sprays into the nostril(s) as directed by provider Daily., Disp: , Rfl:   •  gabapentin (NEURONTIN) 300 MG capsule, Take 600 mg by mouth Every Night., Disp: , Rfl:   •  glycerin adult 2 g suppository, Insert 1 suppository into the rectum As Needed for Constipation., Disp: 12 each, Rfl: 0  •  isosorbide mononitrate (IMDUR) 30 MG 24 hr tablet, Take 1 tablet by mouth once daily, Disp: 30 tablet, Rfl: 5  •  melatonin 5 MG tablet tablet, Take 10 mg by mouth Every Night., Disp: , Rfl:   •  nitroglycerin (Nitrostat) 0.4 MG SL tablet, Place 1 tablet under the tongue Every 5 (Five) Minutes As Needed for Chest Pain. Take no more than 3 doses in 15 minutes., Disp: 25 tablet, Rfl: 11  •  ondansetron ODT (ZOFRAN-ODT) 4 MG disintegrating tablet, Place 1 tablet on the tongue Every 6 (Six) Hours As Needed for Nausea or Vomiting., Disp: 12 tablet, Rfl: 0    ALLERGIES     Pb-hyoscy-atropine-scopolamine    FAMILY HISTORY       Family History   Problem Relation Age of Onset   • Breast cancer Maternal Aunt 60   • Ovarian cancer Daughter 46          SOCIAL HISTORY       Social History     Socioeconomic History   • Marital status:    Tobacco Use   • Smoking status: Never   • Smokeless tobacco: Never   Vaping Use   • Vaping Use: Never used   Substance and Sexual Activity   • Alcohol use: Never   • Drug use: Never   • Sexual activity: Defer         PHYSICAL EXAM    (up to 7 for level 4, 8 or more for level 5)     Vitals:    04/23/23 2048 04/23/23 2250   BP: (!) 187/75 148/74   BP Location: Left arm Right arm   Patient Position: Sitting Sitting   Pulse: 60    Resp: 18    Temp: 97.7 °F (36.5 °C)   "  TempSrc: Oral    SpO2: 97%    Weight: 64 kg (141 lb)    Height: 162.6 cm (64\")        General: Awake, alert, no acute distress.  HEENT: Conjunctivae normal.  Neck: Trachea midline.  Cardiac: Heart regular rate, rhythm, no murmurs, rubs, or gallops  Lungs: Lungs are clear to auscultation, there is no wheezing, rhonchi, or rales. There is no use of accessory muscles.  Chest wall: There is no tenderness to palpation over the chest wall or over ribs  Abdomen: There is very mild generalized discomfort but no distention, rebound, or guarding.  Musculoskeletal: No deformity.  Neuro: Alert and oriented x 4.  Dermatology: Skin is warm and dry  Psych: Mentation is grossly normal, cognition is grossly normal. Affect is appropriate.        DIAGNOSTIC RESULTS     EKG: All EKGs are interpreted by the Emergency Department Physician who either signs or Co-signs this chart in the absence of a cardiologist.    No orders to display         RADIOLOGY:   [x] Radiologist's Report Reviewed:  CT Abdomen Pelvis With Contrast   Final Result   No acute abnormalities in the abdomen or pelvis.      Electronically signed by:  Cody Wheatley M.D.     4/23/2023 8:15 PM Mountain Time          I ordered and independently reviewed the above noted radiographic studies.        LABS:    I have reviewed and interpreted all of the currently available lab results from this visit (if applicable):  Results for orders placed or performed during the hospital encounter of 04/23/23   COVID-19 and FLU A/B PCR - Swab, Nasopharynx    Specimen: Nasopharynx; Swab   Result Value Ref Range    COVID19 Not Detected Not Detected - Ref. Range    Influenza A PCR Not Detected Not Detected    Influenza B PCR Not Detected Not Detected   Comprehensive Metabolic Panel    Specimen: Blood   Result Value Ref Range    Glucose 124 (H) 65 - 99 mg/dL    BUN 12 8 - 23 mg/dL    Creatinine 0.89 0.57 - 1.00 mg/dL    Sodium 140 136 - 145 mmol/L    Potassium 3.8 3.5 - 5.2 mmol/L    " Chloride 105 98 - 107 mmol/L    CO2 23.0 22.0 - 29.0 mmol/L    Calcium 9.6 8.6 - 10.5 mg/dL    Total Protein 7.0 6.0 - 8.5 g/dL    Albumin 4.3 3.5 - 5.2 g/dL    ALT (SGPT) 21 1 - 33 U/L    AST (SGOT) 23 1 - 32 U/L    Alkaline Phosphatase 70 39 - 117 U/L    Total Bilirubin 0.4 0.0 - 1.2 mg/dL    Globulin 2.7 gm/dL    A/G Ratio 1.6 g/dL    BUN/Creatinine Ratio 13.5 7.0 - 25.0    Anion Gap 12.0 5.0 - 15.0 mmol/L    eGFR 62.8 >60.0 mL/min/1.73   Lipase    Specimen: Blood   Result Value Ref Range    Lipase 32 13 - 60 U/L   Urinalysis With Microscopic If Indicated (No Culture) - Urine, Clean Catch    Specimen: Urine, Clean Catch   Result Value Ref Range    Color, UA Yellow Yellow, Straw    Appearance, UA Cloudy (A) Clear    pH, UA 6.0 5.0 - 8.0    Specific Gravity, UA 1.018 1.001 - 1.030    Glucose, UA Negative Negative    Ketones, UA Trace (A) Negative    Bilirubin, UA Negative Negative    Blood, UA Negative Negative    Protein, UA Negative Negative    Leuk Esterase, UA Trace (A) Negative    Nitrite, UA Negative Negative    Urobilinogen, UA 0.2 E.U./dL 0.2 - 1.0 E.U./dL   CBC Auto Differential    Specimen: Blood   Result Value Ref Range    WBC 5.85 3.40 - 10.80 10*3/mm3    RBC 4.60 3.77 - 5.28 10*6/mm3    Hemoglobin 14.5 12.0 - 15.9 g/dL    Hematocrit 44.3 34.0 - 46.6 %    MCV 96.3 79.0 - 97.0 fL    MCH 31.5 26.6 - 33.0 pg    MCHC 32.7 31.5 - 35.7 g/dL    RDW 12.9 12.3 - 15.4 %    RDW-SD 45.7 37.0 - 54.0 fl    MPV 11.0 6.0 - 12.0 fL    Platelets 187 140 - 450 10*3/mm3    Neutrophil % 55.1 42.7 - 76.0 %    Lymphocyte % 35.0 19.6 - 45.3 %    Monocyte % 8.5 5.0 - 12.0 %    Eosinophil % 0.9 0.3 - 6.2 %    Basophil % 0.3 0.0 - 1.5 %    Immature Grans % 0.2 0.0 - 0.5 %    Neutrophils, Absolute 3.22 1.70 - 7.00 10*3/mm3    Lymphocytes, Absolute 2.05 0.70 - 3.10 10*3/mm3    Monocytes, Absolute 0.50 0.10 - 0.90 10*3/mm3    Eosinophils, Absolute 0.05 0.00 - 0.40 10*3/mm3    Basophils, Absolute 0.02 0.00 - 0.20 10*3/mm3    Immature  Grans, Absolute 0.01 0.00 - 0.05 10*3/mm3    nRBC 0.0 0.0 - 0.2 /100 WBC   Urinalysis, Microscopic Only - Urine, Clean Catch    Specimen: Urine, Clean Catch   Result Value Ref Range    RBC, UA 0-2 None Seen, 0-2 /HPF    WBC, UA 3-5 (A) None Seen, 0-2 /HPF    Bacteria, UA None Seen None Seen, Trace /HPF    Squamous Epithelial Cells, UA 0-2 None Seen, 0-2 /HPF    Hyaline Casts, UA 0-6 0 - 6 /LPF    Methodology Automated Microscopy         If labs were ordered, I independently reviewed the results and considered them in treating the patient.      EMERGENCY DEPARTMENT COURSE and DIFFERENTIAL DIAGNOSIS/MDM:   Vitals:  AS OF 23:12 EDT    BP - 148/74  HR - 60  TEMP - 97.7 °F (36.5 °C) (Oral)  O2 SATS - 97%        Discussion below represents my analysis of pertinent findings related to patient's condition, differential diagnosis, treatment plan and final disposition.      Differential diagnosis:  The differential diagnosis associated with the patient's presentation includes: Cholecystitis, biliary colic, pancreatitis, obstruction, perforation, diverticulitis, appendicitis, electrolyte derangement, dehydration      Independent interpretations (ECG/rhythm strip/X-ray/US/CT scan): I independently interpreted the patient's cardiac monitoring which demonstrates sinus rhythm without dysrhythmia.  I dependently interpreted the patient's abdominal CT which shows no evidence of obstruction.        Care significantly affected by Social Determinants of Health (housing and economic circumstances, unemployment)    [] Yes     [x] No   If yes, Patient's care significantly limited by  Social Determinants of Health including:    [] Inadequate housing    [] Low income    [] Alcoholism and drug addiction in family    [] Problems related to primary support group    [] Unemployment    [] Problems related to employment    [] Other Social Determinants of Health:     I considered prescription management with:    [x] Pain medication: I considered  IV morphine for pain control, however patient has no ongoing pain in the ED and pain is intermittent cramping in nature and so felt that this was unwarranted.   [] Antiviral:   [] Antibiotic:   [] Other:        ED Course:    ED Course as of 04/23/23 2312   Sun Apr 23, 2023 2311 On reexamination, patient feels significantly better.  She remains very well-appearing.  Vital signs remained stable.  I have reviewed her results.  CT abdomen and pelvis demonstrates no evidence of obstruction, perforation, or other intra-abdominal pathology.  Labs are also reassuring without any evidence of significant dehydration, electrolyte derangement, or leukocytosis.  I feel the patient appropriate discharge home at this time with close outpatient follow-up and symptomatic treatment. [NS]      ED Course User Index  [NS] Sudheer Sanderson MD             I had a discussion with the patient/family regarding diagnosis, diagnostic results, treatment plan, and medications.  The patient/family indicated understanding of these instructions.  I spent adequate time at the bedside preceding discharge necessary to personally discuss the aftercare instructions, giving patient education, providing explanations of the results of our evaluations/findings, and my decision making to assure that the patient/family understand the plan of care.  Time was allotted to answer questions at that time and throughout the ED course.  Emphasis was placed on timely follow-up after discharge.  I also discussed the potential for the development of an acute emergent condition requiring further evaluation, admission, or even surgical intervention. I discussed that we found nothing during the visit today indicating the need for further workup, admission, or the presence of an unstable medical condition.  I encouraged the patient to return to the emergency department immediately for ANY concerns, worsening, new complaints, or if symptoms persist and unable to seek  follow-up in a timely fashion.  The patient/family expressed understanding and agreement with this plan.  The patient will follow-up with their PCP in 1-2 days for reevaluation.           PROCEDURES:  Procedures    CRITICAL CARE TIME        FINAL IMPRESSION      1. Acute generalized abdominal pain    2. Nausea and vomiting, unspecified vomiting type    3. Diarrhea, unspecified type          DISPOSITION/PLAN     ED Disposition     ED Disposition   Discharge    Condition   Stable    Comment   --               Comment: Please note this report has been produced using speech recognition software.      Sudheer Sanderson MD  Attending Emergency Physician           Sudheer Sanderson MD  04/23/23 7408

## 2023-05-01 RX ORDER — ATORVASTATIN CALCIUM 20 MG/1
TABLET, FILM COATED ORAL
Qty: 30 TABLET | Refills: 6 | Status: SHIPPED | OUTPATIENT
Start: 2023-05-01

## 2023-05-03 NOTE — PROGRESS NOTES
"OFFICE VISIT  NOTE  [facility] CARDIOLOGY MAIN CAMPUS      Name: Monique Betts    Date: 2023  MRN:  [id number]  :  1935      REFERRING/PRIMARY PROVIDER:  Sena Lomeli MD     Chief Complaint   Patient presents with   • Coronary artery disease involving native coronary artery of     HPI: Monique Betts is a 88 y.o. female who presents today for follow up of CAD, hypertension. Had NSTEMI 2022, presented with midsternal chest tightness with activity, troponins slightly elevated to max 0.7, EKGs with nonspecific findings. Cardiac cath demonstrated moderate nonobstructive CAD with normal iFR of mid LAD lesion, normal LVEF. She was started on DAPT, Imdur and Lisinopril and discharged home. Lisinopril was discontinued due to concerns for allergic reaction with hives.   Since last visit, she has remained stable and asymptomatic from cardiac standpoint. Denies chest pain or unusual dyspnea. She has other complaints, \"hoarse voice\" for which she sees Dr. Rodriguez, and had a stomach virus last week. She also has poor appetite and has lost about 12 lbs in the past several months.     ROS:Pertinent positives as listed in the HPI.  All other systems reviewed and negative.    Past Medical History:   Diagnosis Date   • Cancer    • Heart murmur     Skin Cancer   • Sleep apnea     Not using machine for several years.       Past Surgical History:   Procedure Laterality Date   • CARDIAC CATHETERIZATION N/A 2022    Procedure: LEFT HEART CATH;  Surgeon: Antonino Koo MD;  Location: [facility] CATH INVASIVE LOCATION;  Service: Cardiovascular;  Laterality: N/A;       Social History     Socioeconomic History   • Marital status:    Tobacco Use   • Smoking status: Never   • Smokeless tobacco: Never   Vaping Use   • Vaping Use: Never used   Substance and Sexual Activity   • Alcohol use: Yes     Alcohol/week: 7.0 standard drinks     Types: 7 Shots of liquor per week   • Drug use: " "Never   • Sexual activity: Not Currently     Partners: Male       Family History   Problem Relation Age of Onset   • Breast cancer Maternal Aunt 60   • Ovarian cancer Daughter 46        Allergies   Allergen Reactions   • Pb-Hyoscy-Atropine-Scopolamine Anaphylaxis   • Lisinopril Hives       Current Outpatient Medications   Medication Instructions   • aspirin 81 mg, Oral, Daily   • atorvastatin (LIPITOR) 20 MG tablet TAKE 1 TABLET BY MOUTH ONCE DAILY AT NIGHT   • azithromycin (ZITHROMAX) 250 MG tablet Take 2 tablets the first day, then 1 tablet daily for 4 days.   • B Complex Vitamins (vitamin b complex) capsule capsule 1 capsule, Oral, Daily   • bisacodyl (DULCOLAX) 10 mg, Rectal, Daily   • diphenhydrAMINE (BENADRYL) 25 mg, Oral, 2 Times Daily PRN, pt reports taking this only at bedtime   • donepezil (ARICEPT) 5 mg, Oral, Daily, with food.   • famotidine (PEPCID) 20 mg, Oral, 2 Times Daily   • fluticasone (FLONASE) 50 MCG/ACT nasal spray 2 sprays, Nasal, Daily   • gabapentin (NEURONTIN) 600 mg, Oral, Nightly   • glycerin adult 2 g suppository 1 suppository, Rectal, As Needed   • isosorbide mononitrate (IMDUR) 30 MG 24 hr tablet Take 1 tablet by mouth once daily   • Magnesium 250 MG tablet Oral   • melatonin 10 mg, Oral, Nightly   • multivitamin with minerals (QC WOMENS DAILY MULTIVITAMIN PO) 1 tablet, Oral, Daily   • nitroglycerin (NITROSTAT) 0.4 mg, Sublingual, Every 5 Minutes PRN, Take no more than 3 doses in 15 minutes.   • omeprazole (priLOSEC) 40 MG capsule 1 capsule, Oral, Daily       Vitals:    05/04/23 1411   BP: 104/56   BP Location: Left arm   Patient Position: Sitting   Cuff Size: Adult   Pulse: 53   SpO2: 96%   Weight: 61.2 kg (135 lb)   Height: 162.6 cm (64\")     Body mass index is 23.17 kg/m².    PHYSICAL EXAM:    General Appearance:   · well developed  · well nourished  Neck:  · thyroid not enlarged  · supple  Respiratory:  · no respiratory distress  · normal breath sounds  · no " rales  Cardiovascular:  · no jugular venous distention  · regular rhythm  · apical impulse normal  · S1 normal, S2 normal  · no S3, no S4   · no murmur  · no rub, no thrill  · lower extremity edema: none    Skin:   warm, dry    RESULTS:   Procedures    Results for orders placed during the hospital encounter of 06/08/22    Adult Transthoracic Echo Complete W/ Cont if Necessary Per Protocol    Interpretation Summary  · Left ventricular ejection fraction appears to be 61 - 65%. Left ventricular systolic function is normal.  · Left ventricular diastolic function was indeterminate.  · There is mild calcification of the aortic valve.  · Estimated right ventricular systolic pressure from tricuspid regurgitation is normal (<35 mmHg). Calculated right ventricular systolic pressure from tricuspid regurgitation is 26 mmHg.  · Left atrial volume is mildly increased.        Labs:  Lab Results   Component Value Date    CHOL 155 06/09/2022    TRIG 154 (H) 06/09/2022    HDL 37 (L) 06/09/2022    LDL 91 06/09/2022    AST 23 04/23/2023    ALT 21 04/23/2023     Lab Results   Component Value Date    HGBA1C 6.50 (H) 06/09/2022     Creatinine   Date Value Ref Range Status   04/23/2023 0.89 0.57 - 1.00 mg/dL Final   06/13/2022 1.16 (H) 0.57 - 1.00 mg/dL Final   06/09/2022 0.90 0.57 - 1.00 mg/dL Final     No results found for: EGFRIFNONA      ASSESSMENT:  Problem List Items Addressed This Visit        Cardiac and Vasculature    Coronary artery disease involving native coronary artery of native heart without angina pectoris - Primary    Overview     6/8/2022: LHC at [facility] for NSTEMI troponin 0.5.  Mid LAD 50 to 60%, IFR negative at 0.9, proximal and mid RCA tandem 30-40% stenosis, normal nondominant circumflex, LVEF 55 to 60%, LVEDP 20 mmHg.         Relevant Medications    clopidogrel (PLAVIX) 75 MG tablet    Dyslipidemia, goal LDL below 70    Essential hypertension       PLAN:  1. Coronary artery disease  NSTEMI 6/8/22 with cardiac cath  demonstrating moderate nonobstructive CAD, including iFR of mid LAD lesion   Normal LVEF  Continue ASA, statin  She remains asymptomatic from cardiac standpoint, no angina or anginal equivalent  She may stop Imdur at this time as her BP is borderline low and she has not had any angina      2.   Hypertension   Goal <140/90mmHg   Stable, well controlled  Stop Imdur as above     Advised cutting back on caffeine intake and increasing water intake to at least 64 ounces of water     3.  Dyslipidemia   LDL 91 on recent labs  Continue Lipitor 20mg       Advance Care Planning   ACP discussion was held with the patient during this visit. Patient does not have an advance directive, declines further assistance.          Follow-up   Return in about 1 year (around 5/4/2024).      Scribed for Antonino Koo MD by Natalie Way PA-C. 5/4/2023  15:02 EDT  I,Antonino Koo M.D., personally performed the services described in this documentation as scribed by the above named individual in my presence, and it is both accurate and complete.    Antonino Koo MD, [facility], [facility] Cardiology  05/05/23  08:22 EDT

## 2023-05-04 ENCOUNTER — OFFICE VISIT (OUTPATIENT)
Dept: CARDIOLOGY | Facility: CLINIC | Age: 88
End: 2023-05-04
Payer: MEDICARE

## 2023-05-04 VITALS
OXYGEN SATURATION: 96 % | WEIGHT: 135 LBS | BODY MASS INDEX: 23.05 KG/M2 | DIASTOLIC BLOOD PRESSURE: 56 MMHG | HEART RATE: 53 BPM | HEIGHT: 64 IN | SYSTOLIC BLOOD PRESSURE: 104 MMHG

## 2023-05-04 DIAGNOSIS — I10 ESSENTIAL HYPERTENSION: ICD-10-CM

## 2023-05-04 DIAGNOSIS — E78.5 DYSLIPIDEMIA, GOAL LDL BELOW 70: ICD-10-CM

## 2023-05-04 DIAGNOSIS — I25.10 CORONARY ARTERY DISEASE INVOLVING NATIVE CORONARY ARTERY OF NATIVE HEART WITHOUT ANGINA PECTORIS: Primary | ICD-10-CM

## 2023-05-04 RX ORDER — MULTIPLE VITAMINS W/ MINERALS TAB 9MG-400MCG
1 TAB ORAL DAILY
COMMUNITY

## 2023-05-04 RX ORDER — CLOPIDOGREL BISULFATE 75 MG/1
75 TABLET ORAL DAILY
COMMUNITY
End: 2023-05-04

## 2023-05-04 RX ORDER — OMEPRAZOLE 40 MG/1
1 CAPSULE, DELAYED RELEASE ORAL DAILY
COMMUNITY
Start: 2023-03-09

## 2023-05-04 RX ORDER — DONEPEZIL HYDROCHLORIDE 5 MG/1
5 TABLET, FILM COATED ORAL DAILY
COMMUNITY
Start: 2023-03-31

## 2023-05-04 RX ORDER — MULTIVITAMIN WITH IRON
TABLET ORAL
COMMUNITY

## 2023-05-09 ENCOUNTER — HOSPITAL ENCOUNTER (OUTPATIENT)
Dept: MAMMOGRAPHY | Facility: HOSPITAL | Age: 88
Discharge: HOME | End: 2023-05-09
Admitting: INTERNAL MEDICINE
Payer: MEDICARE

## 2023-05-09 DIAGNOSIS — Z12.31 SCREENING MAMMOGRAM FOR BREAST CANCER: ICD-10-CM

## 2023-05-09 PROCEDURE — 77067 SCR MAMMO BI INCL CAD: CPT

## 2023-05-09 PROCEDURE — 77063 BREAST TOMOSYNTHESIS BI: CPT

## 2023-05-12 RX ORDER — SODIUM PICOSULFATE, MAGNESIUM OXIDE, AND ANHYDROUS CITRIC ACID 10; 3.5; 12 MG/160ML; G/160ML; G/160ML
350 LIQUID ORAL TAKE AS DIRECTED
Qty: 320 ML | Refills: 0 | Status: SHIPPED | OUTPATIENT
Start: 2023-05-12

## 2023-05-15 ENCOUNTER — TELEPHONE (OUTPATIENT)
Dept: GASTROENTEROLOGY | Facility: CLINIC | Age: 88
End: 2023-05-15
Payer: MEDICARE

## 2023-05-15 RX ORDER — ISOSORBIDE MONONITRATE 30 MG/1
TABLET, EXTENDED RELEASE ORAL
Qty: 60 TABLET | Refills: 0 | OUTPATIENT
Start: 2023-05-15

## 2023-05-15 NOTE — TELEPHONE ENCOUNTER
PATIENT CALLED, ASKED IF SHE COULD USE AN ENEMA IN PLACE OF CLENPIQ, I STATED THAT WAS NOT AN OPTION WE GIVE. SHE STATED SHE WILL WORK WITH THE PHARMACY TO SEE WHICH SUBSTITUTIONS THE INSURANCE WOULD COVER.

## 2023-05-30 RX ORDER — ISOSORBIDE MONONITRATE 30 MG/1
TABLET, EXTENDED RELEASE ORAL
Qty: 60 TABLET | Refills: 0 | OUTPATIENT
Start: 2023-05-30

## 2023-06-12 RX ORDER — ISOSORBIDE MONONITRATE 30 MG/1
TABLET, EXTENDED RELEASE ORAL
Qty: 60 TABLET | Refills: 0 | OUTPATIENT
Start: 2023-06-12

## 2023-07-26 ENCOUNTER — TELEPHONE (OUTPATIENT)
Dept: GASTROENTEROLOGY | Facility: CLINIC | Age: 88
End: 2023-07-26
Payer: MEDICARE

## 2023-07-26 NOTE — TELEPHONE ENCOUNTER
I SPOKE WITH MS MANNING. I EXPLAINED TO HER THAT THE EARLIEST SHE CAN START HER BOWEL PREP IS 10PM AND 5AM. NO EARLIER. PATIENT VOICED UNDERSTANDING.

## 2023-07-26 NOTE — TELEPHONE ENCOUNTER
Hub staff attempted to follow warm transfer process and was unsuccessful     Caller: Monique Betts    Relationship to patient: Self    Best call back number: [phone]     Patient is needing: PT IS CALLING IN TO ASK QUESTIONS ABOUT HER MEDICATION SHE TAKES. SHE WANTS TO KNOW IF SHE SHOULD STILL TAKE THE MEDICATION. SHE HAS TO PREP FOR HER PROCEDURE TOMORROW. SHE SAID SHE DOES HAVE THE PREP INSTRUCTION BUT DOESN'T UNDERSTAND THEM. PLEASE GIVE PT A CALL BACK ASAP.

## 2023-07-27 ENCOUNTER — OUTSIDE FACILITY SERVICE (OUTPATIENT)
Dept: GASTROENTEROLOGY | Facility: CLINIC | Age: 88
End: 2023-07-27
Payer: MEDICARE

## 2023-07-27 PROCEDURE — 88305 TISSUE EXAM BY PATHOLOGIST: CPT

## 2023-07-27 PROCEDURE — 45385 COLONOSCOPY W/LESION REMOVAL: CPT | Performed by: INTERNAL MEDICINE

## 2023-07-28 ENCOUNTER — LAB REQUISITION (OUTPATIENT)
Dept: LAB | Facility: HOSPITAL | Age: 88
End: 2023-07-28
Payer: MEDICARE

## 2023-07-28 DIAGNOSIS — Z86.010 PERSONAL HISTORY OF COLONIC POLYPS: ICD-10-CM

## 2023-07-28 DIAGNOSIS — D12.2 BENIGN NEOPLASM OF ASCENDING COLON: ICD-10-CM

## 2023-07-28 DIAGNOSIS — K64.8 OTHER HEMORRHOIDS: ICD-10-CM

## 2023-07-28 DIAGNOSIS — K57.30 DIVERTICULOSIS OF LARGE INTESTINE WITHOUT PERFORATION OR ABSCESS WITHOUT BLEEDING: ICD-10-CM

## 2023-07-28 DIAGNOSIS — D12.0 BENIGN NEOPLASM OF CECUM: ICD-10-CM

## 2023-07-29 ENCOUNTER — APPOINTMENT (OUTPATIENT)
Dept: CT IMAGING | Facility: HOSPITAL | Age: 88
End: 2023-07-29
Payer: MEDICARE

## 2023-07-29 ENCOUNTER — HOSPITAL ENCOUNTER (EMERGENCY)
Facility: HOSPITAL | Age: 88
Discharge: HOME | End: 2023-07-29
Attending: EMERGENCY MEDICINE
Payer: MEDICARE

## 2023-07-29 VITALS
RESPIRATION RATE: 16 BRPM | HEART RATE: 53 BPM | TEMPERATURE: 98.1 F | OXYGEN SATURATION: 96 % | SYSTOLIC BLOOD PRESSURE: 161 MMHG | HEIGHT: 64 IN | BODY MASS INDEX: 23.05 KG/M2 | WEIGHT: 135 LBS | DIASTOLIC BLOOD PRESSURE: 64 MMHG

## 2023-07-29 DIAGNOSIS — H93.13 TINNITUS OF BOTH EARS: Primary | ICD-10-CM

## 2023-07-29 LAB
ALBUMIN SERPL-MCNC: 3.8 G/DL (ref 3.5–5.2)
ALBUMIN/GLOB SERPL: 1.7 G/DL
ALP SERPL-CCNC: 77 U/L (ref 39–117)
ALT SERPL W P-5'-P-CCNC: 20 U/L (ref 1–33)
ANION GAP SERPL CALCULATED.3IONS-SCNC: 11 MMOL/L (ref 5–15)
AST SERPL-CCNC: 23 U/L (ref 1–32)
BASOPHILS # BLD AUTO: 0.03 10*3/MM3 (ref 0–0.2)
BASOPHILS NFR BLD AUTO: 0.5 % (ref 0–1.5)
BILIRUB SERPL-MCNC: <0.2 MG/DL (ref 0–1.2)
BUN SERPL-MCNC: 15 MG/DL (ref 8–23)
BUN/CREAT SERPL: 14.4 (ref 7–25)
CALCIUM SPEC-SCNC: 8.6 MG/DL (ref 8.6–10.5)
CHLORIDE SERPL-SCNC: 108 MMOL/L (ref 98–107)
CO2 SERPL-SCNC: 25 MMOL/L (ref 22–29)
CREAT SERPL-MCNC: 1.04 MG/DL (ref 0.57–1)
DEPRECATED RDW RBC AUTO: 46.1 FL (ref 37–54)
EGFRCR SERPLBLD CKD-EPI 2021: 52.1 ML/MIN/1.73
EOSINOPHIL # BLD AUTO: 0.08 10*3/MM3 (ref 0–0.4)
EOSINOPHIL NFR BLD AUTO: 1.4 % (ref 0.3–6.2)
ERYTHROCYTE [DISTWIDTH] IN BLOOD BY AUTOMATED COUNT: 12.7 % (ref 12.3–15.4)
GLOBULIN UR ELPH-MCNC: 2.2 GM/DL
GLUCOSE SERPL-MCNC: 118 MG/DL (ref 65–99)
HCT VFR BLD AUTO: 38.6 % (ref 34–46.6)
HGB BLD-MCNC: 12.2 G/DL (ref 12–15.9)
IMM GRANULOCYTES # BLD AUTO: 0.01 10*3/MM3 (ref 0–0.05)
IMM GRANULOCYTES NFR BLD AUTO: 0.2 % (ref 0–0.5)
LYMPHOCYTES # BLD AUTO: 1.9 10*3/MM3 (ref 0.7–3.1)
LYMPHOCYTES NFR BLD AUTO: 33 % (ref 19.6–45.3)
MAGNESIUM SERPL-MCNC: 2.3 MG/DL (ref 1.6–2.4)
MCH RBC QN AUTO: 31.4 PG (ref 26.6–33)
MCHC RBC AUTO-ENTMCNC: 31.6 G/DL (ref 31.5–35.7)
MCV RBC AUTO: 99.5 FL (ref 79–97)
MONOCYTES # BLD AUTO: 0.56 10*3/MM3 (ref 0.1–0.9)
MONOCYTES NFR BLD AUTO: 9.7 % (ref 5–12)
NEUTROPHILS NFR BLD AUTO: 3.18 10*3/MM3 (ref 1.7–7)
NEUTROPHILS NFR BLD AUTO: 55.2 % (ref 42.7–76)
NRBC BLD AUTO-RTO: 0 /100 WBC (ref 0–0.2)
PLATELET # BLD AUTO: 173 10*3/MM3 (ref 140–450)
PMV BLD AUTO: 11.5 FL (ref 6–12)
POTASSIUM SERPL-SCNC: 4.2 MMOL/L (ref 3.5–5.2)
PROT SERPL-MCNC: 6 G/DL (ref 6–8.5)
QT INTERVAL: 474 MS
QTC INTERVAL: 410 MS
RBC # BLD AUTO: 3.88 10*6/MM3 (ref 3.77–5.28)
SODIUM SERPL-SCNC: 144 MMOL/L (ref 136–145)
TROPONIN T SERPL HS-MCNC: 14 NG/L
TROPONIN T SERPL HS-MCNC: 22 NG/L
WBC NRBC COR # BLD: 5.76 10*3/MM3 (ref 3.4–10.8)

## 2023-07-29 PROCEDURE — 84484 ASSAY OF TROPONIN QUANT: CPT | Performed by: PHYSICIAN ASSISTANT

## 2023-07-29 PROCEDURE — 36415 COLL VENOUS BLD VENIPUNCTURE: CPT

## 2023-07-29 PROCEDURE — 99284 EMERGENCY DEPT VISIT MOD MDM: CPT

## 2023-07-29 PROCEDURE — 80053 COMPREHEN METABOLIC PANEL: CPT | Performed by: PHYSICIAN ASSISTANT

## 2023-07-29 PROCEDURE — 85025 COMPLETE CBC W/AUTO DIFF WBC: CPT | Performed by: PHYSICIAN ASSISTANT

## 2023-07-29 PROCEDURE — 83735 ASSAY OF MAGNESIUM: CPT | Performed by: PHYSICIAN ASSISTANT

## 2023-07-29 PROCEDURE — 70450 CT HEAD/BRAIN W/O DYE: CPT

## 2023-07-29 PROCEDURE — 93005 ELECTROCARDIOGRAM TRACING: CPT | Performed by: PHYSICIAN ASSISTANT

## 2023-07-29 RX ORDER — MECLIZINE HYDROCHLORIDE 25 MG/1
25 TABLET ORAL 3 TIMES DAILY PRN
Qty: 10 TABLET | Refills: 0 | Status: SHIPPED | OUTPATIENT
Start: 2023-07-29

## 2023-07-29 RX ORDER — PREDNISONE 10 MG/1
10 TABLET ORAL 2 TIMES DAILY
Qty: 10 TABLET | Refills: 0 | Status: SHIPPED | OUTPATIENT
Start: 2023-07-29 | End: 2023-08-03

## 2023-07-29 RX ORDER — MECLIZINE HYDROCHLORIDE 25 MG/1
25 TABLET ORAL ONCE
Status: COMPLETED | OUTPATIENT
Start: 2023-07-29 | End: 2023-07-29

## 2023-07-29 RX ADMIN — MECLIZINE HYDROCHLORIDE 25 MG: 25 TABLET ORAL at 21:10

## 2023-07-30 NOTE — ED PROVIDER NOTES
Subjective  History of Present Illness:    Chief Complaint: Ears roaring  History of Present Illness: 88-year-old female presents with a complaint of her ears are roaring, she states that she has had problems with their ears including roaring, dizziness and vertigo for a long time but its been worse over the last several days.  She had a colonoscopy a few days ago after the procedure she states the roaring is worse.  I reviewed and summarized previous medical records looking back, the patient has been under the care of Dr. Arevalo adenitis foot vestibular balance issues and vertigo going back to 2022.  Onset: Chronic but worse lately  Duration: Worsening over the last several days  Exacerbating / Alleviating factors: Recent procedure, colonoscopy 2 days ago  Associated symptoms: Dizziness      Nurses Notes reviewed and agree, including vitals, allergies, social history and prior medical history.     REVIEW OF SYSTEMS: All systems reviewed and not pertinent unless noted.    Review of Systems   HENT:          Ears roaring   Neurological:  Positive for dizziness.   All other systems reviewed and are negative.    Past Medical History:   Diagnosis Date    Cancer     Heart murmur 2000    Skin Cancer    Sleep apnea 1999    Not using machine for several years.       Allergies:    Pb-hyoscy-atropine-scopolamine and Lisinopril      Past Surgical History:   Procedure Laterality Date    CARDIAC CATHETERIZATION N/A 06/08/2022    Procedure: LEFT HEART CATH;  Surgeon: Antonino Koo MD;  Location: [facility] INVASIVE LOCATION;  Service: Cardiovascular;  Laterality: N/A;         Social History     Socioeconomic History    Marital status:    Tobacco Use    Smoking status: Never    Smokeless tobacco: Never   Vaping Use    Vaping Use: Never used   Substance and Sexual Activity    Alcohol use: Yes     Alcohol/week: 7.0 standard drinks     Types: 7 Shots of liquor per week    Drug use: Never    Sexual activity: Not Currently     " Partners: Male         Family History   Problem Relation Age of Onset    Breast cancer Maternal Aunt 60    Ovarian cancer Daughter 46       Objective  Physical Exam:  /64 (BP Location: Left arm, Patient Position: Lying)   Pulse 53   Temp 98.1 °F (36.7 °C)   Resp 16   Ht 162.6 cm (64\")   Wt 61.2 kg (135 lb)   SpO2 96%   BMI 23.17 kg/m²      Physical Exam  Vitals and nursing note reviewed.   Constitutional:       Appearance: She is well-developed.   HENT:      Head: Normocephalic and atraumatic.   Cardiovascular:      Rate and Rhythm: Normal rate.   Pulmonary:      Effort: Pulmonary effort is normal.   Musculoskeletal:         General: Normal range of motion.      Cervical back: Normal range of motion and neck supple.   Skin:     General: Skin is warm and dry.   Neurological:      General: No focal deficit present.      Mental Status: She is alert and oriented to person, place, and time.      Sensory: No sensory deficit.      Coordination: Coordination normal.      Gait: Gait normal.      Deep Tendon Reflexes: Reflexes are normal and symmetric.         Procedures    ED Course:         Lab Results (last 24 hours)       Procedure Component Value Units Date/Time    CBC Auto Differential [[id number]]  (Abnormal) Collected: 07/29/23 2040    Specimen: Blood Updated: 07/29/23 2047     WBC 5.76 10*3/mm3      RBC 3.88 10*6/mm3      Hemoglobin 12.2 g/dL      Hematocrit 38.6 %      MCV 99.5 fL      MCH 31.4 pg      MCHC 31.6 g/dL      RDW 12.7 %      RDW-SD 46.1 fl      MPV 11.5 fL      Platelets 173 10*3/mm3      Neutrophil % 55.2 %      Lymphocyte % 33.0 %      Monocyte % 9.7 %      Eosinophil % 1.4 %      Basophil % 0.5 %      Immature Grans % 0.2 %      Neutrophils, Absolute 3.18 10*3/mm3      Lymphocytes, Absolute 1.90 10*3/mm3      Monocytes, Absolute 0.56 10*3/mm3      Eosinophils, Absolute 0.08 10*3/mm3      Basophils, Absolute 0.03 10*3/mm3      Immature Grans, Absolute 0.01 10*3/mm3      nRBC 0.0 /100 WBC  "    Comprehensive Metabolic Panel [[id number]]  (Abnormal) Collected: 07/29/23 2040    Specimen: Blood Updated: 07/29/23 2107     Glucose 118 mg/dL      BUN 15 mg/dL      Creatinine 1.04 mg/dL      Sodium 144 mmol/L      Potassium 4.2 mmol/L      Comment: Slight hemolysis detected by analyzer. Results may be affected.        Chloride 108 mmol/L      CO2 25.0 mmol/L      Calcium 8.6 mg/dL      Total Protein 6.0 g/dL      Albumin 3.8 g/dL      ALT (SGPT) 20 U/L      AST (SGOT) 23 U/L      Alkaline Phosphatase 77 U/L      Total Bilirubin <0.2 mg/dL      Globulin 2.2 gm/dL      Comment: Calculated Result        A/G Ratio 1.7 g/dL      BUN/Creatinine Ratio 14.4     Anion Gap 11.0 mmol/L      eGFR 52.1 mL/min/1.73     Narrative:      GFR Normal >60  Chronic Kidney Disease <60  Kidney Failure <15    The GFR formula is only valid for adults with stable renal function between ages 18 and 70.    Magnesium [[id number]]  (Normal) Collected: 07/29/23 2040    Specimen: Blood Updated: 07/29/23 2107     Magnesium 2.3 mg/dL     Single High Sensitivity Troponin T [[id number]]  (Abnormal) Collected: 07/29/23 2040    Specimen: Blood Updated: 07/29/23 2107     HS Troponin T 22 ng/L     Narrative:      High Sensitive Troponin T Reference Range:  <10.0 ng/L- Negative Female for AMI  <15.0 ng/L- Negative Male for AMI  >=10 - Abnormal Female indicating possible myocardial injury.  >=15 - Abnormal Male indicating possible myocardial injury.   Clinicians would have to utilize clinical acumen, EKG, Troponin, and serial changes to determine if it is an Acute Myocardial Infarction or myocardial injury due to an underlying chronic condition.         Single High Sensitivity Troponin T [[id number]]  (Abnormal) Collected: 07/29/23 2235    Specimen: Blood Updated: 07/29/23 2313     HS Troponin T 14 ng/L     Narrative:      High Sensitive Troponin T Reference Range:  <10.0 ng/L- Negative Female for AMI  <15.0 ng/L- Negative Male for AMI  >=10 - Abnormal  Female indicating possible myocardial injury.  >=15 - Abnormal Male indicating possible myocardial injury.   Clinicians would have to utilize clinical acumen, EKG, Troponin, and serial changes to determine if it is an Acute Myocardial Infarction or myocardial injury due to an underlying chronic condition.                  CT Head Without Contrast    Result Date: 7/29/2023  CT HEAD WO CONTRAST Date of Exam: 7/29/2023 8:17 PM EDT Indication: dizzy. Comparison: None available. Technique: Axial CT images were obtained of the head without contrast administration.  Automated exposure control and iterative construction methods were used. Findings: There is mild bifrontal atrophy. There is no mass, mass effect or midline shift. There are no areas of acute hemorrhage. There are no abnormal extra-axial fluid collections. The paranasal sinuses and the mastoid air cells are clear.     Impression: Impression: Mild bifrontal atrophy. No acute process. Electronically Signed: eLnin Fajardo  7/29/2023 8:38 PM EDT  Workstation ID: [id number]        Medical Decision Making  88-year-old female with roaring in her ear, patient has a history eustachian disc function, and vertigo and dizziness, she sees ENT in the past for similar symptoms.  The symptoms have been going on for few days recently however she has had chronic groin and ringing in her ear going back several months to years.  On my initial exam she was in no acute distress, no acute neurologic findings or symptoms.  Reviewed and summarized his medical records including labs, radiology reports and previous ENT visits.  Labs were drawn, interpreted myself and discussed with the patient the bedside, initial troponin was elevated at 22 repeat was down 8 points, she is not exhibiting any chest pain.  On my reassessment no change in any neurologic symptoms, she did receive meclizine and had mild relief.  Based on the patient's previous history, physical exam and presentation this appears  to be tinnitus, she has a previous history of similar symptoms, I did write her for meclizine and steroids and recommended follow-up with her ENT.    Problems Addressed:  Tinnitus of both ears: complicated acute illness or injury    Amount and/or Complexity of Data Reviewed  External Data Reviewed: labs and radiology.  Labs: ordered. Decision-making details documented in ED Course.  Radiology: ordered. Decision-making details documented in ED Course.  ECG/medicine tests: ordered.    Risk  Prescription drug management.          Final diagnoses:   Tinnitus of both ears          Shemar Seaman Jr., JEANCARLOS  07/29/23 7080       Shemar Seaman Jr., PA-C  07/30/23 4726

## 2023-07-31 ENCOUNTER — TELEPHONE (OUTPATIENT)
Dept: GASTROENTEROLOGY | Facility: CLINIC | Age: 88
End: 2023-07-31
Payer: MEDICARE

## 2023-07-31 LAB — REF LAB TEST METHOD: NORMAL

## 2023-08-04 LAB
QT INTERVAL: 474 MS
QTC INTERVAL: 410 MS

## 2024-05-06 ENCOUNTER — TRANSCRIBE ORDERS (OUTPATIENT)
Dept: ADMINISTRATIVE | Facility: HOSPITAL | Age: 89
End: 2024-05-06
Payer: MEDICARE

## 2024-05-06 DIAGNOSIS — Z12.31 ENCOUNTER FOR SCREENING MAMMOGRAM FOR MALIGNANT NEOPLASM OF BREAST: Primary | ICD-10-CM

## 2024-05-09 ENCOUNTER — OFFICE VISIT (OUTPATIENT)
Dept: CARDIOLOGY | Facility: CLINIC | Age: 89
End: 2024-05-09
Payer: MEDICARE

## 2024-05-09 VITALS
HEART RATE: 53 BPM | SYSTOLIC BLOOD PRESSURE: 120 MMHG | BODY MASS INDEX: 24.65 KG/M2 | DIASTOLIC BLOOD PRESSURE: 54 MMHG | OXYGEN SATURATION: 99 % | HEIGHT: 64 IN | WEIGHT: 144.4 LBS

## 2024-05-09 DIAGNOSIS — I25.10 CORONARY ARTERY DISEASE INVOLVING NATIVE CORONARY ARTERY OF NATIVE HEART WITHOUT ANGINA PECTORIS: Primary | ICD-10-CM

## 2024-05-09 DIAGNOSIS — E78.5 DYSLIPIDEMIA, GOAL LDL BELOW 70: ICD-10-CM

## 2024-05-09 DIAGNOSIS — I10 ESSENTIAL HYPERTENSION: ICD-10-CM

## 2024-05-09 PROCEDURE — 99214 OFFICE O/P EST MOD 30 MIN: CPT | Performed by: INTERNAL MEDICINE

## 2024-06-19 ENCOUNTER — HOSPITAL ENCOUNTER (OUTPATIENT)
Dept: MAMMOGRAPHY | Facility: HOSPITAL | Age: 89
Discharge: HOME | End: 2024-06-19
Payer: MEDICARE

## 2024-06-19 RX ORDER — ATORVASTATIN CALCIUM 20 MG/1
TABLET, FILM COATED ORAL
Qty: 90 TABLET | Refills: 3 | Status: SHIPPED | OUTPATIENT
Start: 2024-06-19

## 2024-07-23 ENCOUNTER — HOSPITAL ENCOUNTER (OUTPATIENT)
Dept: MAMMOGRAPHY | Facility: HOSPITAL | Age: 89
Discharge: HOME | End: 2024-07-23
Admitting: INTERNAL MEDICINE
Payer: MEDICARE

## 2024-07-23 DIAGNOSIS — Z12.31 ENCOUNTER FOR SCREENING MAMMOGRAM FOR MALIGNANT NEOPLASM OF BREAST: ICD-10-CM

## 2024-07-23 PROCEDURE — 77067 SCR MAMMO BI INCL CAD: CPT

## 2024-07-23 PROCEDURE — 77063 BREAST TOMOSYNTHESIS BI: CPT

## 2024-10-25 ENCOUNTER — HOSPITAL ENCOUNTER (INPATIENT)
Facility: HOSPITAL | Age: 89
LOS: 10 days | Discharge: NURSING, REHAB OR LONG-TERM CARE | DRG: 552 | End: 2024-11-04
Attending: EMERGENCY MEDICINE | Admitting: FAMILY MEDICINE
Payer: MEDICARE

## 2024-10-25 ENCOUNTER — APPOINTMENT (OUTPATIENT)
Dept: CT IMAGING | Facility: HOSPITAL | Age: 89
DRG: 552 | End: 2024-10-25
Payer: MEDICARE

## 2024-10-25 DIAGNOSIS — M54.50 ACUTE ON CHRONIC LOW BACK PAIN: Primary | ICD-10-CM

## 2024-10-25 DIAGNOSIS — G89.29 ACUTE ON CHRONIC LOW BACK PAIN: Primary | ICD-10-CM

## 2024-10-25 DIAGNOSIS — N39.0 ACUTE UTI: ICD-10-CM

## 2024-10-25 PROBLEM — R41.89 COGNITIVE DECLINE: Status: ACTIVE | Noted: 2024-10-25

## 2024-10-25 PROBLEM — K21.9 GERD WITHOUT ESOPHAGITIS: Status: ACTIVE | Noted: 2024-10-25

## 2024-10-25 PROBLEM — R62.7 FAILURE TO THRIVE IN ADULT: Status: ACTIVE | Noted: 2024-10-25

## 2024-10-25 LAB
ALBUMIN SERPL-MCNC: 4.1 G/DL (ref 3.5–5.2)
ALBUMIN/GLOB SERPL: 1.5 G/DL
ALP SERPL-CCNC: 66 U/L (ref 39–117)
ALT SERPL W P-5'-P-CCNC: 15 U/L (ref 1–33)
ANION GAP SERPL CALCULATED.3IONS-SCNC: 9 MMOL/L (ref 5–15)
AST SERPL-CCNC: 21 U/L (ref 1–32)
BACTERIA UR QL AUTO: ABNORMAL /HPF
BASOPHILS # BLD AUTO: 0.06 10*3/MM3 (ref 0–0.2)
BASOPHILS NFR BLD AUTO: 0.9 % (ref 0–1.5)
BILIRUB SERPL-MCNC: 0.2 MG/DL (ref 0–1.2)
BILIRUB UR QL STRIP: NEGATIVE
BUN SERPL-MCNC: 22 MG/DL (ref 8–23)
BUN/CREAT SERPL: 22 (ref 7–25)
CALCIUM SPEC-SCNC: 9.6 MG/DL (ref 8.6–10.5)
CHLORIDE SERPL-SCNC: 105 MMOL/L (ref 98–107)
CLARITY UR: CLEAR
CO2 SERPL-SCNC: 28 MMOL/L (ref 22–29)
COLOR UR: YELLOW
CREAT SERPL-MCNC: 1 MG/DL (ref 0.57–1)
D-LACTATE SERPL-SCNC: 1.1 MMOL/L (ref 0.5–2)
DEPRECATED RDW RBC AUTO: 44.7 FL (ref 37–54)
EGFRCR SERPLBLD CKD-EPI 2021: 54.3 ML/MIN/1.73
EOSINOPHIL # BLD AUTO: 0.09 10*3/MM3 (ref 0–0.4)
EOSINOPHIL NFR BLD AUTO: 1.4 % (ref 0.3–6.2)
ERYTHROCYTE [DISTWIDTH] IN BLOOD BY AUTOMATED COUNT: 12.4 % (ref 12.3–15.4)
GLOBULIN UR ELPH-MCNC: 2.7 GM/DL
GLUCOSE SERPL-MCNC: 169 MG/DL (ref 65–99)
GLUCOSE UR STRIP-MCNC: NEGATIVE MG/DL
HCT VFR BLD AUTO: 41.1 % (ref 34–46.6)
HGB BLD-MCNC: 13.3 G/DL (ref 12–15.9)
HGB UR QL STRIP.AUTO: ABNORMAL
HYALINE CASTS UR QL AUTO: ABNORMAL /LPF
IMM GRANULOCYTES # BLD AUTO: 0.01 10*3/MM3 (ref 0–0.05)
IMM GRANULOCYTES NFR BLD AUTO: 0.2 % (ref 0–0.5)
KETONES UR QL STRIP: ABNORMAL
LEUKOCYTE ESTERASE UR QL STRIP.AUTO: ABNORMAL
LYMPHOCYTES # BLD AUTO: 2.64 10*3/MM3 (ref 0.7–3.1)
LYMPHOCYTES NFR BLD AUTO: 41.3 % (ref 19.6–45.3)
MCH RBC QN AUTO: 31.5 PG (ref 26.6–33)
MCHC RBC AUTO-ENTMCNC: 32.4 G/DL (ref 31.5–35.7)
MCV RBC AUTO: 97.4 FL (ref 79–97)
MONOCYTES # BLD AUTO: 0.65 10*3/MM3 (ref 0.1–0.9)
MONOCYTES NFR BLD AUTO: 10.2 % (ref 5–12)
NEUTROPHILS NFR BLD AUTO: 2.94 10*3/MM3 (ref 1.7–7)
NEUTROPHILS NFR BLD AUTO: 46 % (ref 42.7–76)
NITRITE UR QL STRIP: NEGATIVE
NRBC BLD AUTO-RTO: 0 /100 WBC (ref 0–0.2)
PH UR STRIP.AUTO: 7.5 [PH] (ref 5–8)
PLATELET # BLD AUTO: 203 10*3/MM3 (ref 140–450)
PMV BLD AUTO: 12.1 FL (ref 6–12)
POTASSIUM SERPL-SCNC: 4.1 MMOL/L (ref 3.5–5.2)
PROCALCITONIN SERPL-MCNC: 0.06 NG/ML (ref 0–0.25)
PROT SERPL-MCNC: 6.8 G/DL (ref 6–8.5)
PROT UR QL STRIP: ABNORMAL
RBC # BLD AUTO: 4.22 10*6/MM3 (ref 3.77–5.28)
RBC # UR STRIP: ABNORMAL /HPF
REF LAB TEST METHOD: ABNORMAL
SODIUM SERPL-SCNC: 142 MMOL/L (ref 136–145)
SP GR UR STRIP: 1.01 (ref 1–1.03)
SQUAMOUS #/AREA URNS HPF: ABNORMAL /HPF
UROBILINOGEN UR QL STRIP: ABNORMAL
WBC # UR STRIP: ABNORMAL /HPF
WBC NRBC COR # BLD AUTO: 6.39 10*3/MM3 (ref 3.4–10.8)

## 2024-10-25 PROCEDURE — 99222 1ST HOSP IP/OBS MODERATE 55: CPT | Performed by: PHYSICIAN ASSISTANT

## 2024-10-25 PROCEDURE — 87040 BLOOD CULTURE FOR BACTERIA: CPT | Performed by: EMERGENCY MEDICINE

## 2024-10-25 PROCEDURE — 25010000002 HYDROMORPHONE PER 4 MG: Performed by: EMERGENCY MEDICINE

## 2024-10-25 PROCEDURE — 99285 EMERGENCY DEPT VISIT HI MDM: CPT

## 2024-10-25 PROCEDURE — 25010000002 CEFTRIAXONE PER 250 MG: Performed by: EMERGENCY MEDICINE

## 2024-10-25 PROCEDURE — 36415 COLL VENOUS BLD VENIPUNCTURE: CPT

## 2024-10-25 PROCEDURE — 25010000002 ONDANSETRON PER 1 MG: Performed by: EMERGENCY MEDICINE

## 2024-10-25 PROCEDURE — 87086 URINE CULTURE/COLONY COUNT: CPT | Performed by: EMERGENCY MEDICINE

## 2024-10-25 PROCEDURE — 84145 PROCALCITONIN (PCT): CPT | Performed by: EMERGENCY MEDICINE

## 2024-10-25 PROCEDURE — 93005 ELECTROCARDIOGRAM TRACING: CPT | Performed by: EMERGENCY MEDICINE

## 2024-10-25 PROCEDURE — 83605 ASSAY OF LACTIC ACID: CPT | Performed by: EMERGENCY MEDICINE

## 2024-10-25 PROCEDURE — 81001 URINALYSIS AUTO W/SCOPE: CPT | Performed by: EMERGENCY MEDICINE

## 2024-10-25 PROCEDURE — 72131 CT LUMBAR SPINE W/O DYE: CPT

## 2024-10-25 PROCEDURE — 80053 COMPREHEN METABOLIC PANEL: CPT | Performed by: EMERGENCY MEDICINE

## 2024-10-25 PROCEDURE — 85025 COMPLETE CBC W/AUTO DIFF WBC: CPT | Performed by: EMERGENCY MEDICINE

## 2024-10-25 PROCEDURE — 70450 CT HEAD/BRAIN W/O DYE: CPT

## 2024-10-25 RX ORDER — ACETAMINOPHEN 325 MG/1
650 TABLET ORAL EVERY 4 HOURS PRN
Status: DISCONTINUED | OUTPATIENT
Start: 2024-10-25 | End: 2024-11-04 | Stop reason: HOSPADM

## 2024-10-25 RX ORDER — NITROGLYCERIN 0.4 MG/1
0.4 TABLET SUBLINGUAL
Status: DISCONTINUED | OUTPATIENT
Start: 2024-10-25 | End: 2024-11-04 | Stop reason: HOSPADM

## 2024-10-25 RX ORDER — PREGABALIN 25 MG/1
50 CAPSULE ORAL 3 TIMES DAILY
Status: DISCONTINUED | OUTPATIENT
Start: 2024-10-25 | End: 2024-11-04 | Stop reason: HOSPADM

## 2024-10-25 RX ORDER — SODIUM CHLORIDE 0.9 % (FLUSH) 0.9 %
10 SYRINGE (ML) INJECTION AS NEEDED
Status: DISCONTINUED | OUTPATIENT
Start: 2024-10-25 | End: 2024-11-04 | Stop reason: HOSPADM

## 2024-10-25 RX ORDER — BACLOFEN 10 MG/1
10 TABLET ORAL 3 TIMES DAILY PRN
COMMUNITY
End: 2024-11-04 | Stop reason: HOSPADM

## 2024-10-25 RX ORDER — SODIUM CHLORIDE 9 MG/ML
75 INJECTION, SOLUTION INTRAVENOUS CONTINUOUS
Status: DISCONTINUED | OUTPATIENT
Start: 2024-10-25 | End: 2024-10-26

## 2024-10-25 RX ORDER — ASPIRIN 81 MG/1
81 TABLET, CHEWABLE ORAL DAILY
Status: DISCONTINUED | OUTPATIENT
Start: 2024-10-26 | End: 2024-11-04 | Stop reason: HOSPADM

## 2024-10-25 RX ORDER — HYDROMORPHONE HYDROCHLORIDE 1 MG/ML
0.25 INJECTION, SOLUTION INTRAMUSCULAR; INTRAVENOUS; SUBCUTANEOUS ONCE
Status: COMPLETED | OUTPATIENT
Start: 2024-10-25 | End: 2024-10-25

## 2024-10-25 RX ORDER — PREGABALIN 50 MG/1
50 CAPSULE ORAL 3 TIMES DAILY
COMMUNITY

## 2024-10-25 RX ORDER — ROSUVASTATIN CALCIUM 10 MG/1
10 TABLET, COATED ORAL DAILY
Status: DISCONTINUED | OUTPATIENT
Start: 2024-10-26 | End: 2024-11-04 | Stop reason: HOSPADM

## 2024-10-25 RX ORDER — ACETAMINOPHEN 160 MG/5ML
650 SOLUTION ORAL EVERY 4 HOURS PRN
Status: DISCONTINUED | OUTPATIENT
Start: 2024-10-25 | End: 2024-11-04 | Stop reason: HOSPADM

## 2024-10-25 RX ORDER — DONEPEZIL HYDROCHLORIDE 10 MG/1
10 TABLET, FILM COATED ORAL DAILY
Status: DISCONTINUED | OUTPATIENT
Start: 2024-10-26 | End: 2024-11-04 | Stop reason: HOSPADM

## 2024-10-25 RX ORDER — SODIUM CHLORIDE 0.9 % (FLUSH) 0.9 %
10 SYRINGE (ML) INJECTION EVERY 12 HOURS SCHEDULED
Status: DISCONTINUED | OUTPATIENT
Start: 2024-10-25 | End: 2024-11-04 | Stop reason: HOSPADM

## 2024-10-25 RX ORDER — ACETAMINOPHEN 650 MG/1
650 SUPPOSITORY RECTAL EVERY 4 HOURS PRN
Status: DISCONTINUED | OUTPATIENT
Start: 2024-10-25 | End: 2024-11-04 | Stop reason: HOSPADM

## 2024-10-25 RX ORDER — ONDANSETRON 2 MG/ML
4 INJECTION INTRAMUSCULAR; INTRAVENOUS ONCE
Status: COMPLETED | OUTPATIENT
Start: 2024-10-25 | End: 2024-10-25

## 2024-10-25 RX ORDER — ROSUVASTATIN CALCIUM 10 MG/1
10 TABLET, COATED ORAL DAILY
COMMUNITY

## 2024-10-25 RX ORDER — SODIUM CHLORIDE 9 MG/ML
40 INJECTION, SOLUTION INTRAVENOUS AS NEEDED
Status: ACTIVE | OUTPATIENT
Start: 2024-10-25 | End: 2024-10-30

## 2024-10-25 RX ORDER — ALENDRONATE SODIUM 70 MG/1
70 TABLET ORAL
COMMUNITY

## 2024-10-25 RX ADMIN — ACETAMINOPHEN 650 MG: 325 TABLET ORAL at 23:21

## 2024-10-25 RX ADMIN — SODIUM CHLORIDE 1000 MG: 900 INJECTION INTRAVENOUS at 18:25

## 2024-10-25 RX ADMIN — ONDANSETRON 4 MG: 2 INJECTION INTRAMUSCULAR; INTRAVENOUS at 18:20

## 2024-10-25 RX ADMIN — HYDROMORPHONE HYDROCHLORIDE 0.25 MG: 1 INJECTION, SOLUTION INTRAMUSCULAR; INTRAVENOUS; SUBCUTANEOUS at 18:21

## 2024-10-25 RX ADMIN — Medication 10 ML: at 23:00

## 2024-10-25 NOTE — ED PROVIDER NOTES
"Subjective   History of Present Illness  89-year-old female presents for evaluation of acute on chronic low back pain.  The patient is accompanied by her son who corroborates and aids in her history.  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  She tells me that she has been seeing pain management for the last 3 years and notes that the \"injections for her pain are no longer working.\"  She previously saw Dr. Vernon of pain management.  Over the last week, the pain in her low back has worsened to the point that she is no longer able to walk or care for herself.  She lives alone.  She has been trying to walk with a walker for the past week but notes that today she could not even do that.  The walker is causing discomfort to her left upper arm/shoulder.  She saw her pain management doctor on October 21 regarding her symptoms, and I did a thorough review of her records from that encounter, most notably her clinic note as well as her plain films which did not reveal any underlying fracture or dislocation.  She denies any bowel or bladder incontinence or retention.  She notes that she feels \"unsteady on her feet.\"  She notes a sensation of weakness to both of her lower extremities in addition to her low back pain.  She notes radicular pain in her right lower extremity.  Given her ongoing symptoms, her son took her to see her pain specialist at [facility] earlier today and then she was subsequently referred here to the emergency department.  Her son is hoping that she can be admitted, at least for observation, as she cannot walk or take care of herself in her current state.      Review of Systems   Musculoskeletal:  Positive for back pain.   Neurological:  Positive for dizziness and weakness.   All other systems reviewed and are negative.      Past Medical History:   Diagnosis Date    Cancer     Heart murmur 2000    Skin Cancer    Sleep apnea 1999    Not using machine for several years. "       Allergies   Allergen Reactions    Pb-Hyoscy-Atropine-Scopolamine Anaphylaxis    Lisinopril Hives       Past Surgical History:   Procedure Laterality Date    CARDIAC CATHETERIZATION N/A 06/08/2022    Procedure: LEFT HEART CATH;  Surgeon: Antonino Koo MD;  Location: [facility] CATH INVASIVE LOCATION;  Service: Cardiovascular;  Laterality: N/A;       Family History   Problem Relation Age of Onset    Breast cancer Maternal Aunt 60    Ovarian cancer Daughter 46       Social History     Socioeconomic History    Marital status:    Tobacco Use    Smoking status: Never    Smokeless tobacco: Never   Vaping Use    Vaping status: Never Used   Substance and Sexual Activity    Alcohol use: Yes     Alcohol/week: 7.0 standard drinks of alcohol     Types: 7 Shots of liquor per week    Drug use: Never    Sexual activity: Not Currently     Partners: Male           Objective   Physical Exam  Vitals and nursing note reviewed.   Constitutional:       General: She is not in acute distress.     Appearance: She is well-developed. She is not diaphoretic.      Comments: Nontoxic-appearing elderly female   HENT:      Head: Normocephalic and atraumatic.   Eyes:      Pupils: Pupils are equal, round, and reactive to light.   Cardiovascular:      Rate and Rhythm: Normal rate and regular rhythm.      Heart sounds: Normal heart sounds. No murmur heard.     No friction rub. No gallop.   Pulmonary:      Effort: Pulmonary effort is normal. No respiratory distress.      Breath sounds: Normal breath sounds. No wheezing or rales.   Abdominal:      General: Bowel sounds are normal. There is no distension.      Palpations: Abdomen is soft. There is no mass.      Tenderness: There is no abdominal tenderness. There is no guarding or rebound.   Genitourinary:     Comments: No CVA tenderness present  Musculoskeletal:         General: No signs of injury.      Cervical back: Neck supple.   Skin:     General: Skin is warm and dry.      Findings: No  "erythema or rash.   Neurological:      Mental Status: She is alert.      Comments: Awake, alert, and oriented x3, clear and fluent speech, unable to stand without assistance, neurovascularly intact distally in all fours with bounding distal pulses and normal sensation, no cranial nerve deficits noted with cranial nerves II through XII grossly intact, normoreflexic patellar tendons bilaterally, no saddle anesthesia present   Psychiatric:         Mood and Affect: Mood normal.         Thought Content: Thought content normal.         Judgment: Judgment normal.         Procedures           ED Course  ED Course as of 10/25/24 2143   Fri Oct 25, 2024   1619 Prior to evaluating the patient, I reviewed her outside records from the [facility].  I reviewed her record from Dr. Valero of pain management on October 21.  I reviewed her x-ray images of her left forearm, left shoulder, and left humerus from October 21 as well.  I personally reviewed and interpreted the imaging myself and agree with radiology that there is no underlying fracture or dislocation noted. [DD]   6666 89-year-old female presents for evaluation of acute on chronic low back pain.  The patient is accompanied by her son who corroborates and aids in her history.  The patient has a history of chronic low back pain.  She tells me that she has seen pain management for the last 3 years and notes that the \"injections for her pain are no longer working.\"  Over the last week, the pain in her low back has worsened to the point that she is no longer able to walk or care for herself at home.  She has been trying to walk with a walker but notes that today she could not even do that.  The walker is causing discomfort to her left upper arm/shoulder.  She denies any bowel or bladder incontinence or retention.  She notes that she feels \"unsteady on her feet.\"  Given her ongoing symptoms, her son took her to see her pain specialist at [facility] earlier " today and she was subsequently referred here to the emergency department.  Her son is hoping that she can be admitted as she cannot walk or take care of herself and lives alone.  On arrival to the ED, the patient is nontoxic-appearing.  No saddle anesthesia noted.  She has normal patellar reflexes bilaterally.  She is unable to stand without assistance.  Differential diagnosis is quite broad.  We will obtain labs and imaging, and we will reassess following initial interventions. [DD]   1706 Unfortunately, I was contacted by MRI and told that because of the patient's spinal cord stimulator, we are unable to obtain an MRI at this time.  As a result, we will obtain CTs as this is our next best option. [DD]   1730 EKG interpreted independently by me revealed sinus bradycardia with a heart rate of 59 and no ST segments suggestive of or concerning for ischemia. [DD]   1750 Urinalysis is suggestive of infection.  Urine culture obtained.  Rocephin given. [DD]   1754 I personally and independently reviewed the patient's CT images and findings, and I am in agreement with the radiologist regarding CT interpretation--particularly there is no emergent/surgical intracranial process present. [DD]   1758 I personally and independently reviewed the patient's CT images and findings, and I am in agreement with the radiologist regarding CT interpretation--particularly regarding mild degenerative changes without evidence of emergent central process. [DD]   1805 Given the patient's inability to walk and inability to care for herself at this time, feel that the most prudent course of action at this point is to admit her to the hospital for further evaluation and treatment.  Her son went to her home to try and get the remote to her spinal stimulator so that she could potentially have an MRI. [DD]   1854 I discussed the patient's case with our hospitalist, Dr. Kerley, and the patient will be admitted under his care for further evaluation  and treatment.  The patient is hemodynamically stable at this time and is aware/agreeable with plan. [DD]      ED Course User Index  [DD] Alvarez Byrd MD                                     Recent Results (from the past 24 hours)   Urinalysis With Culture If Indicated - Urine, Clean Catch    Collection Time: 10/25/24  4:45 PM    Specimen: Urine, Clean Catch   Result Value Ref Range    Color, UA Yellow Yellow, Straw    Appearance, UA Clear Clear    pH, UA 7.5 5.0 - 8.0    Specific Gravity, UA 1.015 1.001 - 1.030    Glucose, UA Negative Negative    Ketones, UA 15 mg/dL (1+) (A) Negative    Bilirubin, UA Negative Negative    Blood, UA Trace (A) Negative    Protein, UA Trace (A) Negative    Leuk Esterase, UA Moderate (2+) (A) Negative    Nitrite, UA Negative Negative    Urobilinogen, UA 0.2 E.U./dL 0.2 - 1.0 E.U./dL   Urinalysis, Microscopic Only - Urine, Clean Catch    Collection Time: 10/25/24  4:45 PM    Specimen: Urine, Clean Catch   Result Value Ref Range    RBC, UA 0-2 None Seen, 0-2 /HPF    WBC, UA 6-10 (A) None Seen, 0-2 /HPF    Bacteria, UA 2+ (A) None Seen, Trace /HPF    Squamous Epithelial Cells, UA 3-6 (A) None Seen, 0-2 /HPF    Hyaline Casts, UA 0-6 0 - 6 /LPF    Methodology Automated Microscopy    ECG 12 Lead Other; dizziness    Collection Time: 10/25/24  5:27 PM   Result Value Ref Range    QT Interval 438 ms    QTC Interval 433 ms   Comprehensive Metabolic Panel    Collection Time: 10/25/24  6:08 PM    Specimen: Arm, Right; Blood   Result Value Ref Range    Glucose 169 (H) 65 - 99 mg/dL    BUN 22 8 - 23 mg/dL    Creatinine 1.00 0.57 - 1.00 mg/dL    Sodium 142 136 - 145 mmol/L    Potassium 4.1 3.5 - 5.2 mmol/L    Chloride 105 98 - 107 mmol/L    CO2 28.0 22.0 - 29.0 mmol/L    Calcium 9.6 8.6 - 10.5 mg/dL    Total Protein 6.8 6.0 - 8.5 g/dL    Albumin 4.1 3.5 - 5.2 g/dL    ALT (SGPT) 15 1 - 33 U/L    AST (SGOT) 21 1 - 32 U/L    Alkaline Phosphatase 66 39 - 117 U/L    Total Bilirubin 0.2 0.0 - 1.2  mg/dL    Globulin 2.7 gm/dL    A/G Ratio 1.5 g/dL    BUN/Creatinine Ratio 22.0 7.0 - 25.0    Anion Gap 9.0 5.0 - 15.0 mmol/L    eGFR 54.3 (L) >60.0 mL/min/1.73   CBC Auto Differential    Collection Time: 10/25/24  6:08 PM    Specimen: Arm, Right; Blood   Result Value Ref Range    WBC 6.39 3.40 - 10.80 10*3/mm3    RBC 4.22 3.77 - 5.28 10*6/mm3    Hemoglobin 13.3 12.0 - 15.9 g/dL    Hematocrit 41.1 34.0 - 46.6 %    MCV 97.4 (H) 79.0 - 97.0 fL    MCH 31.5 26.6 - 33.0 pg    MCHC 32.4 31.5 - 35.7 g/dL    RDW 12.4 12.3 - 15.4 %    RDW-SD 44.7 37.0 - 54.0 fl    MPV 12.1 (H) 6.0 - 12.0 fL    Platelets 203 140 - 450 10*3/mm3    Neutrophil % 46.0 42.7 - 76.0 %    Lymphocyte % 41.3 19.6 - 45.3 %    Monocyte % 10.2 5.0 - 12.0 %    Eosinophil % 1.4 0.3 - 6.2 %    Basophil % 0.9 0.0 - 1.5 %    Immature Grans % 0.2 0.0 - 0.5 %    Neutrophils, Absolute 2.94 1.70 - 7.00 10*3/mm3    Lymphocytes, Absolute 2.64 0.70 - 3.10 10*3/mm3    Monocytes, Absolute 0.65 0.10 - 0.90 10*3/mm3    Eosinophils, Absolute 0.09 0.00 - 0.40 10*3/mm3    Basophils, Absolute 0.06 0.00 - 0.20 10*3/mm3    Immature Grans, Absolute 0.01 0.00 - 0.05 10*3/mm3    nRBC 0.0 0.0 - 0.2 /100 WBC   Procalcitonin    Collection Time: 10/25/24  6:08 PM    Specimen: Arm, Right; Blood   Result Value Ref Range    Procalcitonin 0.06 0.00 - 0.25 ng/mL   Lactic Acid, Plasma    Collection Time: 10/25/24  6:08 PM    Specimen: Arm, Right; Blood   Result Value Ref Range    Lactate 1.1 0.5 - 2.0 mmol/L     Note: In addition to lab results from this visit, the labs listed above may include labs taken at another facility or during a different encounter within the last 24 hours. Please correlate lab times with ED admission and discharge times for further clarification of the services performed during this visit.    CT Head Without Contrast   Final Result   Impression:   No acute intracranial abnormality.            Electronically Signed: Yash Galvan DO     10/25/2024 5:50 PM EDT      Workstation ID: [id number]      CT Lumbar Spine Without Contrast   Final Result   Impression:   No acute lumbar spine abnormality.      Mild degenerative changes noted throughout the visualized spine, similar in appearance to prior CT abdomen and pelvis.            Electronically Signed: Yash Galvan DO     10/25/2024 5:52 PM EDT     Workstation ID: [id number]        Vitals:    10/25/24 1910 10/25/24 1930 10/25/24 1931 10/25/24 2059   BP:    158/81   BP Location:    Right arm   Patient Position:       Pulse:  (!) 49 50 56   Resp:    18   Temp:    96.5 °F (35.8 °C)   TempSrc:       SpO2: (!) 89% 98% 96% 98%   Weight:       Height:         Medications   sodium chloride 0.9 % flush 10 mL (has no administration in time range)   HYDROmorphone (DILAUDID) injection 0.25 mg (0.25 mg Intravenous Given 10/25/24 1821)   ondansetron (ZOFRAN) injection 4 mg (4 mg Intravenous Given 10/25/24 1820)   cefTRIAXone (ROCEPHIN) 1,000 mg in sodium chloride 0.9 % 100 mL MBP (0 mg Intravenous Stopped 10/25/24 1901)     ECG/EMG Results (last 24 hours)       Procedure Component Value Units Date/Time    ECG 12 Lead Other; dizziness [[id number]] Collected: 10/25/24 1727     Updated: 10/25/24 1727     QT Interval 438 ms      QTC Interval 433 ms     Narrative:      Test Reason : Other~  Blood Pressure :   */*   mmHG  Vent. Rate :  59 BPM     Atrial Rate :  59 BPM     P-R Int : 152 ms          QRS Dur :  84 ms      QT Int : 438 ms       P-R-T Axes :  33 -23  13 degrees     QTc Int : 433 ms    Sinus bradycardia  Otherwise normal ECG  When compared with ECG of 29-JUL-2023 20:53,  No significant change was found    Referred By: GROVER           Confirmed By:           ECG 12 Lead Other; dizziness   Preliminary Result   Test Reason : Other~   Blood Pressure :   */*   mmHG   Vent. Rate :  59 BPM     Atrial Rate :  59 BPM      P-R Int : 152 ms          QRS Dur :  84 ms       QT Int : 438 ms       P-R-T Axes :  33 -23  13 degrees      QTc Int : 433 ms       Sinus bradycardia   Otherwise normal ECG   When compared with ECG of 29-JUL-2023 20:53,   No significant change was found      Referred By: GROVER           Confirmed By:                     Medical Decision Making      Final diagnoses:   Acute on chronic low back pain   Acute UTI       ED Disposition  ED Disposition       ED Disposition   Decision to Admit    Condition   --    Comment   Level of Care: Med/Surg [1]   Diagnosis: UTI (urinary tract infection) [135462]   Admitting Physician: KERLEY, BRIAN JOSEPH [[id number]]   Attending Physician: KERLEY, BRIAN JOSEPH [[id number]]   Certification: I Certify That Inpatient Hospital Services Are Medically Necessary For Greater Than 2 Midnights                 No follow-up provider specified.       Medication List      No changes were made to your prescriptions during this visit.            Alvarez Byrd MD  10/25/24 0271

## 2024-10-26 ENCOUNTER — APPOINTMENT (OUTPATIENT)
Dept: GENERAL RADIOLOGY | Facility: HOSPITAL | Age: 89
DRG: 552 | End: 2024-10-26
Payer: MEDICARE

## 2024-10-26 LAB
ANION GAP SERPL CALCULATED.3IONS-SCNC: 8 MMOL/L (ref 5–15)
BACTERIA SPEC AEROBE CULT: NORMAL
BASOPHILS # BLD AUTO: 0.03 10*3/MM3 (ref 0–0.2)
BASOPHILS NFR BLD AUTO: 0.6 % (ref 0–1.5)
BUN SERPL-MCNC: 22 MG/DL (ref 8–23)
BUN/CREAT SERPL: 21 (ref 7–25)
CALCIUM SPEC-SCNC: 8.8 MG/DL (ref 8.6–10.5)
CHLORIDE SERPL-SCNC: 105 MMOL/L (ref 98–107)
CK SERPL-CCNC: 49 U/L (ref 20–180)
CO2 SERPL-SCNC: 28 MMOL/L (ref 22–29)
CREAT SERPL-MCNC: 1.05 MG/DL (ref 0.57–1)
DEPRECATED RDW RBC AUTO: 44.6 FL (ref 37–54)
EGFRCR SERPLBLD CKD-EPI 2021: 51.2 ML/MIN/1.73
EOSINOPHIL # BLD AUTO: 0.12 10*3/MM3 (ref 0–0.4)
EOSINOPHIL NFR BLD AUTO: 2.4 % (ref 0.3–6.2)
ERYTHROCYTE [DISTWIDTH] IN BLOOD BY AUTOMATED COUNT: 12.5 % (ref 12.3–15.4)
GLUCOSE SERPL-MCNC: 124 MG/DL (ref 65–99)
HCT VFR BLD AUTO: 37.3 % (ref 34–46.6)
HGB BLD-MCNC: 12.4 G/DL (ref 12–15.9)
IMM GRANULOCYTES # BLD AUTO: 0.01 10*3/MM3 (ref 0–0.05)
IMM GRANULOCYTES NFR BLD AUTO: 0.2 % (ref 0–0.5)
LYMPHOCYTES # BLD AUTO: 2.54 10*3/MM3 (ref 0.7–3.1)
LYMPHOCYTES NFR BLD AUTO: 51.4 % (ref 19.6–45.3)
MCH RBC QN AUTO: 32.4 PG (ref 26.6–33)
MCHC RBC AUTO-ENTMCNC: 33.2 G/DL (ref 31.5–35.7)
MCV RBC AUTO: 97.4 FL (ref 79–97)
MONOCYTES # BLD AUTO: 0.55 10*3/MM3 (ref 0.1–0.9)
MONOCYTES NFR BLD AUTO: 11.1 % (ref 5–12)
NEUTROPHILS NFR BLD AUTO: 1.69 10*3/MM3 (ref 1.7–7)
NEUTROPHILS NFR BLD AUTO: 34.3 % (ref 42.7–76)
NRBC BLD AUTO-RTO: 0 /100 WBC (ref 0–0.2)
PLATELET # BLD AUTO: 177 10*3/MM3 (ref 140–450)
PMV BLD AUTO: 12.1 FL (ref 6–12)
POTASSIUM SERPL-SCNC: 4.3 MMOL/L (ref 3.5–5.2)
RBC # BLD AUTO: 3.83 10*6/MM3 (ref 3.77–5.28)
SODIUM SERPL-SCNC: 141 MMOL/L (ref 136–145)
WBC NRBC COR # BLD AUTO: 4.94 10*3/MM3 (ref 3.4–10.8)

## 2024-10-26 PROCEDURE — 63710000001 ONDANSETRON ODT 4 MG TABLET DISPERSIBLE: Performed by: INTERNAL MEDICINE

## 2024-10-26 PROCEDURE — 97530 THERAPEUTIC ACTIVITIES: CPT

## 2024-10-26 PROCEDURE — 97166 OT EVAL MOD COMPLEX 45 MIN: CPT

## 2024-10-26 PROCEDURE — 85025 COMPLETE CBC W/AUTO DIFF WBC: CPT | Performed by: PHYSICIAN ASSISTANT

## 2024-10-26 PROCEDURE — 73030 X-RAY EXAM OF SHOULDER: CPT

## 2024-10-26 PROCEDURE — 80048 BASIC METABOLIC PNL TOTAL CA: CPT | Performed by: PHYSICIAN ASSISTANT

## 2024-10-26 PROCEDURE — 82550 ASSAY OF CK (CPK): CPT | Performed by: INTERNAL MEDICINE

## 2024-10-26 PROCEDURE — 99232 SBSQ HOSP IP/OBS MODERATE 35: CPT | Performed by: INTERNAL MEDICINE

## 2024-10-26 PROCEDURE — 25010000002 CEFTRIAXONE PER 250 MG: Performed by: PHYSICIAN ASSISTANT

## 2024-10-26 PROCEDURE — 25010000002 HEPARIN (PORCINE) PER 1000 UNITS: Performed by: INTERNAL MEDICINE

## 2024-10-26 PROCEDURE — 25810000003 SODIUM CHLORIDE 0.9 % SOLUTION: Performed by: PHYSICIAN ASSISTANT

## 2024-10-26 RX ORDER — HEPARIN SODIUM 5000 [USP'U]/ML
5000 INJECTION, SOLUTION INTRAVENOUS; SUBCUTANEOUS EVERY 8 HOURS SCHEDULED
Status: DISCONTINUED | OUTPATIENT
Start: 2024-10-26 | End: 2024-11-04 | Stop reason: HOSPADM

## 2024-10-26 RX ORDER — HYDROCODONE BITARTRATE AND ACETAMINOPHEN 7.5; 325 MG/1; MG/1
1 TABLET ORAL EVERY 6 HOURS PRN
Status: DISPENSED | OUTPATIENT
Start: 2024-10-26 | End: 2024-10-31

## 2024-10-26 RX ORDER — ONDANSETRON 2 MG/ML
4 INJECTION INTRAMUSCULAR; INTRAVENOUS EVERY 6 HOURS PRN
Status: DISCONTINUED | OUTPATIENT
Start: 2024-10-26 | End: 2024-11-04 | Stop reason: HOSPADM

## 2024-10-26 RX ORDER — LOSARTAN POTASSIUM 25 MG/1
25 TABLET ORAL
Status: DISCONTINUED | OUTPATIENT
Start: 2024-10-26 | End: 2024-11-04 | Stop reason: HOSPADM

## 2024-10-26 RX ORDER — HYDROCODONE BITARTRATE AND ACETAMINOPHEN 5; 325 MG/1; MG/1
1 TABLET ORAL EVERY 6 HOURS PRN
Status: DISPENSED | OUTPATIENT
Start: 2024-10-26 | End: 2024-10-31

## 2024-10-26 RX ORDER — ONDANSETRON 4 MG/1
4 TABLET, ORALLY DISINTEGRATING ORAL EVERY 6 HOURS PRN
Status: DISCONTINUED | OUTPATIENT
Start: 2024-10-26 | End: 2024-11-04 | Stop reason: HOSPADM

## 2024-10-26 RX ADMIN — Medication 5 MG: at 20:59

## 2024-10-26 RX ADMIN — HYDROCODONE BITARTRATE AND ACETAMINOPHEN 1 TABLET: 7.5; 325 TABLET ORAL at 23:18

## 2024-10-26 RX ADMIN — Medication 10 ML: at 20:42

## 2024-10-26 RX ADMIN — HYDROCODONE BITARTRATE AND ACETAMINOPHEN 1 TABLET: 5; 325 TABLET ORAL at 09:46

## 2024-10-26 RX ADMIN — ONDANSETRON 4 MG: 4 TABLET, ORALLY DISINTEGRATING ORAL at 19:29

## 2024-10-26 RX ADMIN — HEPARIN SODIUM 5000 UNITS: 5000 INJECTION INTRAVENOUS; SUBCUTANEOUS at 14:22

## 2024-10-26 RX ADMIN — SODIUM CHLORIDE 75 ML/HR: 9 INJECTION, SOLUTION INTRAVENOUS at 02:00

## 2024-10-26 RX ADMIN — LOSARTAN POTASSIUM 25 MG: 25 TABLET, FILM COATED ORAL at 19:29

## 2024-10-26 RX ADMIN — ROSUVASTATIN 10 MG: 10 TABLET, FILM COATED ORAL at 09:07

## 2024-10-26 RX ADMIN — HYDROCODONE BITARTRATE AND ACETAMINOPHEN 1 TABLET: 5; 325 TABLET ORAL at 17:18

## 2024-10-26 RX ADMIN — SODIUM CHLORIDE 1000 MG: 900 INJECTION INTRAVENOUS at 11:58

## 2024-10-26 RX ADMIN — DONEPEZIL HYDROCHLORIDE 10 MG: 10 TABLET, FILM COATED ORAL at 09:07

## 2024-10-26 RX ADMIN — ASPIRIN 81 MG 81 MG: 81 TABLET ORAL at 09:07

## 2024-10-26 RX ADMIN — PREGABALIN 50 MG: 25 CAPSULE ORAL at 20:39

## 2024-10-26 RX ADMIN — HEPARIN SODIUM 5000 UNITS: 5000 INJECTION INTRAVENOUS; SUBCUTANEOUS at 20:39

## 2024-10-26 RX ADMIN — Medication 10 ML: at 09:08

## 2024-10-26 NOTE — PLAN OF CARE
Goal Outcome Evaluation:  Plan of Care Reviewed With: patient        Progress: improving  Outcome Evaluation: Patient was oriented except stated it was September.  Per pt. she feels with her legs getting weaker the last few weeks she has had to put more weight on her 3 wheeled walker and felt strained her shoulder on left. Per pt. rw wx used today mush more supportive and easier to use.   Pt. needed AAROM to left shoulder and more pain lowering noted than  raising.  Pt. was able to wipe self sitting, but needed assist with gown and transfers.  Pt. went 10 ft with rw wx and a chair follow 1 + 1 min A. Pt. appropriate for skilled OT services to address deficits in ROM, strength, balance, motor control and change in PLOF ADLs and related transfers. IRF recommended at dischargel    Anticipated Discharge Disposition (OT): inpatient rehabilitation facility

## 2024-10-26 NOTE — THERAPY EVALUATION
Patient Name: Monique Betts  : 1935    MRN: [id number]                              Today's Date: 10/26/2024       Admit Date: 10/25/2024    Visit Dx:     ICD-10-CM ICD-9-CM   1. Acute on chronic low back pain  M54.50 724.2    G89.29 338.19     338.29   2. Acute UTI  N39.0 599.0     Patient Active Problem List   Diagnosis    NSTEMI (non-ST elevated myocardial infarction)    COVID-19 virus infection    Coronary artery disease involving native coronary artery of native heart without angina pectoris    Dyslipidemia, goal LDL below 70    Essential hypertension    UTI (urinary tract infection)    Failure to thrive in adult    Cognitive decline    GERD without esophagitis     Past Medical History:   Diagnosis Date    Cancer     Heart murmur     Skin Cancer    Sleep apnea     Not using machine for several years.     Past Surgical History:   Procedure Laterality Date    CARDIAC CATHETERIZATION N/A 2022    Procedure: LEFT HEART CATH;  Surgeon: Antonino Koo MD;  Location: [facility] CATH INVASIVE LOCATION;  Service: Cardiovascular;  Laterality: N/A;      General Information       Row Name 10/26/24 1347          OT Time and Intention    Subjective Information complains of;pain;weakness  with LUE movement  -FAM     Document Type evaluation  -FAM     Mode of Treatment individual therapy;occupational therapy  -FAM     Patient Effort good  -FAM     Symptoms Noted During/After Treatment increased pain  -FAM     Comment no pain LUE at rest, but increased pain with movement and especially lowering LUE  -FAM       Row Name 10/26/24 1349          General Information    Patient Profile Reviewed yes  -FAM     Prior Level of Function independent:;all household mobility;community mobility;gait;transfer;bed mobility;feeding;grooming;dressing;bathing;cooking;driving;shopping;mod assist:;cleaning  per pt. past 2 weeks had to use electric cart at grocery, recent increased WB on 3 wheeled walker due to LE weakness  -FAM      Existing Precautions/Restrictions fall;other (see comments)  L shoulder pain with use  -FAM     Barriers to Rehab impaired sensation;physical barrier  L shoulder pain with use  -FAM       Row Name 10/26/24 1347          Occupational Profile    Environmental Supports and Barriers (Occupational Profile) shower chair and grab bars in tub shower, BSC, cane, 3 wheeled walker  -FAM       Row Name 10/26/24 1347          Living Environment    People in Home alone  -FAM       Row Name 10/26/24 1347          Home Main Entrance    Number of Stairs, Main Entrance one  -FAM     Stair Railings, Main Entrance none;other (see comments)  per pt. she holds to door fram  -FAM       Row Name 10/26/24 1347          Stairs Within Home, Primary    Stairs, Within Home, Primary per pt. railing at one step into den  -FAM     Number of Stairs, Within Home, Primary one  -FAM       Row Name 10/26/24 1347          Cognition    Orientation Status (Cognition) oriented to;person;place;disoriented to;time;other (see comments)  pt. knew year, but thought it was still September  -FAM       Row Name 10/26/24 1347          Safety Issues/Impairments Affecting Functional Mobility    Safety Issues Affecting Function (Mobility) insight into deficits/self-awareness;safety precaution awareness;safety precautions follow-through/compliance  -FAM     Impairments Affecting Function (Mobility) balance;endurance/activity tolerance;pain;range of motion (ROM);strength;sensation/sensory awareness;motor control  -FAM               User Key  (r) = Recorded By, (t) = Taken By, (c) = Cosigned By      Initials Name Provider Type    FAM Kerry Mayo, OT Occupational Therapist                     Mobility/ADL's       Row Name 10/26/24 1351          Bed Mobility    Bed Mobility supine-sit  -FAM     Supine-Sit [place] (Bed Mobility) contact guard  -FAM     Bed Mobility, Safety Issues decreased use of arms for pushing/pulling  -FAM     Assistive Device (Bed Mobility) bed rails;head of  bed elevated  -FAM     Comment, (Bed Mobility) limited use of LUE to assist due to pain with use  -FAM       Row Name 10/26/24 1351          Transfers    Transfers sit-stand transfer;stand-sit transfer;toilet transfer;bed-chair transfer  -FAM       Row Name 10/26/24 1351          Bed-Chair Transfer    Bed-Chair [place] (Transfers) verbal cues;moderate assist (50% patient effort);1 person assist  -FAM     Assistive Device (Bed-Chair Transfers) other (see comments)  -FAM     Comment, (Bed-Chair Transfer) BUE support  -FAM       Row Name 10/26/24 1351          Sit-Stand Transfer    Sit-Stand [place] (Transfers) minimum assist (75% patient effort);verbal cues;1 person assist  -FAM     Assistive Device (Sit-Stand Transfers) other (see comments);walker, front-wheeled  -FAM     Comment, (Sit-Stand Transfer) BUE support first stand and mod of 1 and CGA of 2nd  -       Row Name 10/26/24 [facility]          Stand-Sit Transfer    Stand-Sit [place] (Transfers) minimum assist (75% patient effort);1 person assist;verbal cues  -     Assistive Device (Stand-Sit Transfers) walker, front-wheeled  -FAM       Row Name 10/26/24 1351          Toilet Transfer    Type (Toilet Transfer) sit-stand;stand-sit  -FAM     [place] Level (Toilet Transfer) minimum assist (75% patient effort);1 person assist  -     Assistive Device (Toilet Transfer) commode, bedside without drop arms;walker, front-wheeled  -FAM       Row Name 10/26/24 1351          Functional Mobility    Functional Mobility- Ind. Level minimum assist (75% patient effort);1 person + 1 person to manage equipment  -     Functional Mobility- Device walker, front-wheeled  -     Functional Mobility-Distance (Feet) 10  -     Functional Mobility- Safety Issues step length decreased  -     Functional Mobility- Comment chair follow, pt. very shaky with unsteady steps, pt. reported rw wx much easier to use than her 3 wheeled walker, which was straining her arms when her LE's  got weak  -FAM       Row Name 10/26/24 [facility]          Activities of Daily Living    BADL Assessment/Intervention upper body dressing;lower body dressing;grooming;toileting  -FAM       Row Name 10/26/24 1351          Upper Body Dressing Assessment/Training    [place] Level (Upper Body Dressing) don;pajama/robe;maximum assist (25% patient effort)  -FAM     Position (Upper Body Dressing) edge of bed sitting  -FAM     Comment, (Upper Body Dressing) limited by IV and LUE pain  -FAM       Row Name 10/26/24 [facility]          Lower Body Dressing Assessment/Training    [place] Level (Lower Body Dressing) don;socks;dependent (less than 25% patient effort)  -FAM     Position (Lower Body Dressing) supine  -FAM     Comment, (Lower Body Dressing) did not have pt. attempt due to left shoulder pain  -FAM       Row Name 10/26/24 1351          Grooming Assessment/Training    Position (Grooming) supported sitting  -FAM     Comment, (Grooming) pt. cleeaned hands post toileting post setup  -FAM       Row Name 10/26/24 1351          Toileting Assessment/Training    [place] Level (Toileting) adjust/manage clothing;moderate assist (50% patient effort);perform perineal hygiene;standby assist;set up  -FAM     Assistive Devices (Toileting) commode, bedside without drop arms  -FAM     Position (Toileting) supported sitting  -FAM     Comment, (Toileting) pt. needed assist with back gown, pt. able to wipe self with wipe once handed to her, purewick changed out  -FAM               User Key  (r) = Recorded By, (t) = Taken By, (c) = Cosigned By      Initials Name Provider Type    Kerry Pérez OT Occupational Therapist                   Obj/Interventions       Row Name 10/26/24 1356          Sensory Assessment (Somatosensory)    Sensory Assessment (Somatosensory) UE sensation intact  -     Sensory Subjective Reports numbness  -FAM     Sensory Assessment per pt. her feet are almost completely numb  -       Row Name 10/26/24 7156          Vision  Assessment/Intervention    Visual Impairment/Limitations corrective lenses full-time  -       Row Name 10/26/24 1356          Range of Motion Comprehensive    General Range of Motion upper extremity range of motion deficits identified  -FAM     Comment, General Range of Motion BUE WFL minus L shoulder pt. needed AAROM for shoulder flexion 120 degrees grossly, pt. able to perform ER L shoulder, pain with raising, but more so when lowering  -FAM       Row Name 10/26/24 1356          Strength Comprehensive (MMT)    General Manual Muscle Testing (MMT) Assessment upper extremity strength deficits identified  -FAM     Comment, General Manual Muscle Testing (MMT) Assessment RUE grossly 4+/5. L shoulder 2+/5, L distal hand 4/5, elbow F/E at least 3+/5 with observation of bed mobility and walker use  -       Row Name 10/26/24 1356          Motor Skills    Motor Skills functional endurance  -     Functional Endurance new 02 use 1 L, pt. bed to BSC and toileting 94% on room air, post 10 ft with walker 90% on room air, 1 L redonned  -       Row Name 10/26/24 8276          Balance    Balance Assessment sitting static balance;sitting dynamic balance;standing static balance;standing dynamic balance;sit to stand dynamic balance  -FAM     Static Sitting Balance standby assist  -FAM     Dynamic Sitting Balance standby assist  -FAM     Position, Sitting Balance unsupported;sitting edge of bed;other (see comments)  BSC  -FAM     Sit to Stand Dynamic Balance minimal assist;1-person assist  -FAM     Static Standing Balance minimal assist;1-person assist  -FAM     Dynamic Standing Balance minimal assist;1-person assist  -FAM     Position/Device Used, Standing Balance supported;walker, front-wheeled  -FAM               User Key  (r) = Recorded By, (t) = Taken By, (c) = Cosigned By      Initials Name Provider Type    Kerry Pérez, OT Occupational Therapist                   Goals/Plan       Row Name 10/26/24 9425          Transfer Goal 1  (OT)    Activity/Assistive Device (Transfer Goal 1, OT) sit-to-stand/stand-to-sit;bed-to-chair/chair-to-bed;toilet;walker, rolling;commode, bedside without drop arms  -FAM     [place] Level/Cues Needed (Transfer Goal 1, OT) contact guard required  -FAM     Time Frame (Transfer Goal 1, OT) long term goal (LTG);10 days  -FAM     Progress/Outcome (Transfer Goal 1, OT) new goal  -FAM       Row Name 10/26/24 6161          Dressing Goal 1 (OT)    Activity/Device (Dressing Goal 1, OT) upper body dressing;lower body dressing  -FAM     [place]/Cues Needed (Dressing Goal 1, OT) minimum assist (75% or more patient effort)  -FAM     Time Frame (Dressing Goal 1, OT) long term goal (LTG);10 days  -FAM     Strategies/Barriers (Dressing Goal 1, OT) AE prn  -FAM     Progress/Outcome (Dressing Goal 1, OT) new goal  -FAM       Row Name 10/26/24 0127          Grooming Goal 1 (OT)    Activity/Device (Grooming Goal 1, OT) hair care;oral care;wash face, hands  -FAM     [place] (Grooming Goal 1, OT) contact guard required  -FAM     Time Frame (Grooming Goal 1, OT) long term goal (LTG);10 days  -FAM     Strategies/Barriers (Grooming Goal 1, OT) sinkside standing  -FAM     Progress/Outcome (Grooming Goal 1, OT) new goal  -FAM       Row Name 10/26/24 9543          ROM Goal 1 (OT)    ROM Goal 1 (OT) BUE 10 reps AROM/AAROM to support ADL independence.  -FAM     Time Frame (ROM Goal 1, OT) short term goal (STG);5 days  -FAM     Progress/Outcome (ROM Goal 1, OT) new goal  -FAM       Row Name 10/26/24 1623          Therapy Assessment/Plan (OT)    Planned Therapy Interventions (OT) activity tolerance training;BADL retraining;adaptive equipment training;occupation/activity based interventions;patient/caregiver education/training;strengthening exercise;transfer/mobility retraining;ROM/therapeutic exercise;functional balance retraining;neuromuscular control/coordination retraining  -FAM               User Key  (r) = Recorded By, (t) = Taken By, (c) =  Cosigned By      Initials Name Provider Type    Kerry Pérez, OT Occupational Therapist                   Clinical Impression       Row Name 10/26/24 1401          Pain Assessment    Pretreatment Pain Rating 0/10 - no pain  -FAM     Posttreatment Pain Rating 0/10 - no pain  -FAM     Pain Management Interventions exercise or physical activity utilized;positioning techniques utilized  -FAM     Response to Pain Interventions activity participation with increased pain  -FAM     Pre/Posttreatment Pain Comment per pt. with pain meds grossly 2 hours ago, alerted nursing pt. would like next dose as soon as could have it, no pain at rest, but L shoulder pain with movement, expecially lowering shoulder  -FAM       Row Name 10/26/24 1401          Plan of Care Review    Plan of Care Reviewed With patient  -FAM     Progress improving  -FAM     Outcome Evaluation Patient was oriented except stated it was September.  Per pt. she feels with her legs getting weaker the last few weeks she has had to put more weight on her 3 wheeled walker and felt strained her shoulder on left. Per pt. rw wx used today mush more supportive and easier to use.   Pt. needed AAROM to left shoulder and more pain lowering noted than  raising.  Pt. was able to wipe self sitting, but needed assist with gown and transfers.  Pt. went 10 ft with rw wx and a chair follow 1 + 1 min A. Pt. appropriate for skilled OT services to address deficits in ROM, strength, balance, motor control and change in PLOF ADLs and related transfers. IRF recommended at dischargel  -FAM       Row Name 10/26/24 1401          Therapy Assessment/Plan (OT)    Patient/Family Therapy Goal Statement (OT) return to PLOF and reduce L shoulder pain so can return to PLOF  -FAM     Rehab Potential (OT) good  -FAM     Criteria for Skilled Therapeutic Interventions Met (OT) yes;meets criteria;skilled treatment is necessary  -FAM     Therapy Frequency (OT) daily  -FAM     Predicted Duration of Therapy  Intervention (OT) 10 days  -FAM       Row Name 10/26/24 1401          Therapy Plan Review/Discharge Plan (OT)    Equipment Needs Upon Discharge (OT) walker, rolling  -FAM     Anticipated Discharge Disposition (OT) inpatient rehabilitation facility  -FAM       Row Name 10/26/24 1401          Vital Signs    Pretreatment Heart Rate (beats/min) 55  -FAM     Posttreatment Heart Rate (beats/min) 55  -FAM     Pre SpO2 (%) 97  -FAM     O2 Delivery Pre Treatment nasal cannula  -FAM     Intra SpO2 (%) 90  post 10 ft, when off on BSC stayed 94%  -FAM     O2 Delivery Intra Treatment room air  -FAM     O2 Delivery Post Treatment room air  -FAM     Pre Patient Position Supine  -FAM     Intra Patient Position Standing  -FAM     Post Patient Position Sitting  -FAM       Row Name 10/26/24 1401          Positioning and Restraints    Pre-Treatment Position in bed  -FAM     Post Treatment Position chair  -FAM     In Chair notified nsg;reclined;call light within reach;encouraged to call for assist;exit alarm on;legs elevated;waffle cushion  -FAM               User Key  (r) = Recorded By, (t) = Taken By, (c) = Cosigned By      Initials Name Provider Type    Kerry Pérez, OT Occupational Therapist                   Outcome Measures       Row Name 10/26/24 1412          How much help from another is currently needed...    Putting on and taking off regular lower body clothing? 2  -FAM     Bathing (including washing, rinsing, and drying) 2  -FAM     Toileting (which includes using toilet bed pan or urinal) 3  -FAM     Putting on and taking off regular upper body clothing 2  -FAM     Taking care of personal grooming (such as brushing teeth) 3  -FAM     Eating meals 4  -FAM     AM-PAC 6 Clicks Score (OT) 16  -FAM       Row Name 10/26/24 0953          How much help from another person do you currently need...    Turning from your back to your side while in flat bed without using bedrails? 3  -SA     Moving from lying on back to sitting on the side of a flat bed  without bedrails? 3  -SA     Moving to and from a bed to a chair (including a wheelchair)? 2  -SA     Standing up from a chair using your arms (e.g., wheelchair, bedside chair)? 2  -SA     Climbing 3-5 steps with a railing? 2  -SA     To walk in hospital room? 2  -SA     AM-PAC 6 Clicks Score (PT) 14  -SA       Row Name 10/26/24 1412          Functional Assessment    Outcome Measure Options AM-PAC 6 Clicks Daily Activity (OT)  -FAM               User Key  (r) = Recorded By, (t) = Taken By, (c) = Cosigned By      Initials Name Provider Type    Kerry Pérez, OT Occupational Therapist    Karon Blank RN Registered Nurse                    Occupational Therapy Education       Title: PT OT SLP Therapies (In Progress)       Topic: Occupational Therapy (In Progress)       Point: ADL training (Done)       Description:   Instruct learner(s) on proper safety adaptation and remediation techniques during self care or transfers.   Instruct in proper use of assistive devices.                  Learning Progress Summary            Patient Acceptance, E,D, NR,VU by FAM at 10/26/2024 1413    Comment: reason for consult, noted deficits, benefit of activity, walker safety                      Point: Home exercise program (Not Started)       Description:   Instruct learner(s) on appropriate technique for monitoring, assisting and/or progressing therapeutic exercises/activities.                  Learner Progress:  Not documented in this visit.              Point: Precautions (Done)       Description:   Instruct learner(s) on prescribed precautions during self-care and functional transfers.                  Learning Progress Summary            Patient Acceptance, E,D, NR,VU by FAM at 10/26/2024 1413    Comment: reason for consult, noted deficits, benefit of activity, walker safety                      Point: Body mechanics (Done)       Description:   Instruct learner(s) on proper positioning and spine alignment during self-care,  functional mobility activities and/or exercises.                  Learning Progress Summary            Patient Acceptance, E,D, NR,VU by FAM at 10/26/2024 0953    Comment: reason for consult, noted deficits, benefit of activity, walker safety                                      User Key       Initials Effective Dates Name Provider Type Discipline    FAM 07/11/23 -  Kerry Mayo, OT Occupational Therapist OT                  OT Recommendation and Plan  Planned Therapy Interventions (OT): activity tolerance training, BADL retraining, adaptive equipment training, occupation/activity based interventions, patient/caregiver education/training, strengthening exercise, transfer/mobility retraining, ROM/therapeutic exercise, functional balance retraining, neuromuscular control/coordination retraining  Therapy Frequency (OT): daily  Plan of Care Review  Plan of Care Reviewed With: patient  Progress: improving  Outcome Evaluation: Patient was oriented except stated it was September.  Per pt. she feels with her legs getting weaker the last few weeks she has had to put more weight on her 3 wheeled walker and felt strained her shoulder on left. Per pt. rw wx used today mush more supportive and easier to use.   Pt. needed AAROM to left shoulder and more pain lowering noted than  raising.  Pt. was able to wipe self sitting, but needed assist with gown and transfers.  Pt. went 10 ft with rw wx and a chair follow 1 + 1 min A. Pt. appropriate for skilled OT services to address deficits in ROM, strength, balance, motor control and change in PLOF ADLs and related transfers. IRF recommended at dischargel     Time Calculation:   Evaluation Complexity (OT)  Review Occupational Profile/Medical/Therapy History Complexity: expanded/moderate complexity  Assessment, Occupational Performance/Identification of Deficit Complexity: 3-5 performance deficits  Clinical Decision Making Complexity (OT): detailed assessment/moderate complexity  Overall  Complexity of Evaluation (OT): moderate complexity     Time Calculation- OT       Row Name 10/26/24 1414             Time Calculation- OT    OT Start Time 1302  -FAM      OT Received On 10/26/24  -FAM      OT Goal Re-Cert Due Date 11/04/24  -FAM         Timed Charges    73389 - OT Therapeutic Activity Minutes 8  -FAM      87695 - OT Self Care/Mgmt Minutes 8  -FAM         Untimed Charges    OT Eval/Re-eval Minutes 56  -FAM         Total Minutes    Timed Charges Total Minutes 16  -FAM      Untimed Charges Total Minutes 56  -FAM       Total Minutes 72  -FAM                User Key  (r) = Recorded By, (t) = Taken By, (c) = Cosigned By      Initials Name Provider Type    Kerry Pérez, NICHOLAS Occupational Therapist                  Therapy Charges for Today       Code Description Service Date Service Provider Modifiers Qty    84680625672 HC OT THERAPEUTIC ACT EA 15 MIN 10/26/2024 Kerry Mayo OT GO 1    [id number] HC OT EVAL MOD COMPLEXITY 4 10/26/2024 Kerry Mayo OT GO 1                 Kerry Mayo OT  10/26/2024

## 2024-10-26 NOTE — H&P
"    [facility]  HISTORY AND PHYSICAL    Patient Name: Monique Betts  : 1935  MRN: [id number]  Primary Care Physician: Sena Lomeli MD  Date of admission: 10/25/2024    Subjective   Subjective     Chief Complaint:  Generalized weakness, back pain      HPI:  Monique Betts is a 89 y.o. female with a history of CAD, HTN, HLD, Cognitive decline, and chronic back pain who presents to [facility] ED for complaint of worsening generalized weakness and failure to thrive. She reports that she lives alone and is usually able to ambulate with a walker. However, over the last several days, she has had increasing difficulty ambulating even with a walker. He denies recent falls, but states that her legs \"feel like jello\". She denies fever, chills, chest pain, SOB, cough, abdominal pain, nausea or vomiting. She has a history of chronic low back pain and has a nerve stimulator in place. She does report that her back seems to be hurting more than usual as well. Her son, whom was present at bedside in the ED reports that he took her to see her pain specialist at [facility] earlier today and she was subsequently referred here to the emergency department.         Review of Systems   Constitutional:  Positive for fatigue. Negative for chills, fever and unexpected weight change.   HENT:  Negative for nosebleeds, sore throat and trouble swallowing.    Eyes:  Negative for photophobia and visual disturbance.   Respiratory:  Negative for cough, shortness of breath and wheezing.    Cardiovascular:  Negative for chest pain and palpitations.   Gastrointestinal:  Negative for abdominal pain, diarrhea, nausea and vomiting.   Genitourinary:  Negative for dysuria and hematuria.   Musculoskeletal:  Positive for arthralgias, back pain and myalgias.   Skin: Negative.    Neurological:  Negative for tremors, syncope, weakness and headaches.   Psychiatric/Behavioral:  Negative for " confusion. The patient is not nervous/anxious.                 Personal History     Past Medical History:   Diagnosis Date    Cancer     Heart murmur 2000    Skin Cancer    Sleep apnea 1999    Not using machine for several years.             Past Surgical History:   Procedure Laterality Date    CARDIAC CATHETERIZATION N/A 06/08/2022    Procedure: LEFT HEART CATH;  Surgeon: Antonino Koo MD;  Location: [facility] CATH INVASIVE LOCATION;  Service: Cardiovascular;  Laterality: N/A;       Family History:  family history includes Breast cancer (age of onset: 60) in her maternal aunt; Ovarian cancer (age of onset: 46) in her daughter.     Social History:  reports that she has never smoked. She has never used smokeless tobacco. She reports current alcohol use of about 7.0 standard drinks of alcohol per week. She reports that she does not use drugs.  Social History     Social History Narrative    Not on file       Medications:  Magnesium, alendronate, aspirin, atorvastatin, baclofen, diphenhydrAMINE, donepezil, fluticasone, meclizine, melatonin, multivitamin with minerals, nitroglycerin, pregabalin, rosuvastatin, and vitamin b complex    Allergies   Allergen Reactions    Pb-Hyoscy-Atropine-Scopolamine Anaphylaxis    Lisinopril Hives       Objective   Objective     Vital Signs:   Temp:  [96.5 °F (35.8 °C)-97.6 °F (36.4 °C)] 96.5 °F (35.8 °C)  Heart Rate:  [49-64] 56  Resp:  [18] 18  BP: (139-158)/(66-94) 158/81  Flow (L/min) (Oxygen Therapy):  [2] 2    Physical Exam  Constitutional:       General: She is not in acute distress.     Appearance: Normal appearance.   HENT:      Head: Atraumatic.      Right Ear: External ear normal.      Left Ear: External ear normal.      Nose: Nose normal.   Eyes:      Extraocular Movements: Extraocular movements intact.      Conjunctiva/sclera: Conjunctivae normal.      Pupils: Pupils are equal, round, and reactive to light.   Cardiovascular:      Rate and Rhythm: Normal rate and regular  rhythm.      Pulses: Normal pulses.      Heart sounds: Normal heart sounds. No murmur heard.  Pulmonary:      Effort: Pulmonary effort is normal. No respiratory distress.      Breath sounds: Normal breath sounds. No wheezing, rhonchi or rales.   Abdominal:      General: Bowel sounds are normal. There is no distension.      Tenderness: There is no abdominal tenderness. There is no guarding or rebound.   Musculoskeletal:         General: Normal range of motion.      Cervical back: No rigidity.      Right lower leg: No edema.      Left lower leg: No edema.   Skin:     General: Skin is warm and dry.      Coloration: Skin is not jaundiced.      Findings: No lesion or rash.   Neurological:      General: No focal deficit present.      Mental Status: She is alert and oriented to person, place, and time.   Psychiatric:         Attention and Perception: Attention normal.         Mood and Affect: Mood normal.         Behavior: Behavior normal.         Thought Content: Thought content normal.          Result Review:  I have personally reviewed the results from the time of this admission to 10/25/2024 22:35 EDT and agree with these findings:  [x]  Laboratory list / accordion  []  Microbiology  [x]  Radiology  [x]  EKG/Telemetry   []  Cardiology/Vascular   []  Pathology  [x]  Old records  []  Other:      LAB RESULTS:      Lab 10/25/24  1808   WBC 6.39   HEMOGLOBIN 13.3   HEMATOCRIT 41.1   PLATELETS 203   NEUTROS ABS 2.94   IMMATURE GRANS (ABS) 0.01   LYMPHS ABS 2.64   MONOS ABS 0.65   EOS ABS 0.09   MCV 97.4*   PROCALCITONIN 0.06   LACTATE 1.1         Lab 10/25/24  1808   SODIUM 142   POTASSIUM 4.1   CHLORIDE 105   CO2 28.0   ANION GAP 9.0   BUN 22   CREATININE 1.00   EGFR 54.3*   GLUCOSE 169*   CALCIUM 9.6         Lab 10/25/24  1808   TOTAL PROTEIN 6.8   ALBUMIN 4.1   GLOBULIN 2.7   ALT (SGPT) 15   AST (SGOT) 21   BILIRUBIN 0.2   ALK PHOS 66                     Brief Urine Lab Results  (Last result in the past 365 days)         Color   Clarity   Blood   Leuk Est   Nitrite   Protein   CREAT   Urine HCG        10/25/24 1645 Yellow   Clear   Trace   Moderate (2+)   Negative   Trace                 Microbiology Results (last 10 days)       ** No results found for the last 240 hours. **            CT Lumbar Spine Without Contrast    Result Date: 10/25/2024  CT LUMBAR SPINE WO CONTRAST Date of Exam: 10/25/2024 5:36 PM EDT Indication: LE weakness, back pain. Comparison: CT abdomen and pelvis 4/23/2023 Technique: Axial CT images were obtained of the lumbar spine without contrast administration.  Reconstructed coronal and sagittal images were also obtained. Automated exposure control and iterative construction methods were used. Findings: Vertebral body heights are maintained. Unchanged grade 1 anterolisthesis of L4 on L5. Otherwise overall alignment is maintained. Mild loss of intervertebral disc base height and degenerative changes noted throughout the visualized spine. The visualized lung bases are clear. Mild calcification of the visualized abdominal aorta. Presumed cyst within the right hepatic lobe. Extensive colonic diverticulosis without evidence of acute diverticulitis. Otherwise the visualized intra-abdominal, intrapelvic and paravertebral soft tissues are unremarkable     Impression: Impression: No acute lumbar spine abnormality. Mild degenerative changes noted throughout the visualized spine, similar in appearance to prior CT abdomen and pelvis. Electronically Signed: Yash Galvan DO  10/25/2024 5:52 PM EDT  Workstation ID: [id number]    CT Head Without Contrast    Result Date: 10/25/2024  CT HEAD WO CONTRAST Date of Exam: 10/25/2024 5:36 PM EDT Indication: dizziness. Comparison: 7/29/2024 Technique: Axial CT images were obtained of the head without contrast administration.  Automated exposure control and iterative construction methods were used. Findings: No large territory infarct. There is no evidence of hemorrhage. No mass  effect, edema or midline shift Mild periventricular and subcortical white matter hypodensities, nonspecific but most likely represents chronic small vessel ischemic changes. No extra-axial fluid collection. The ventricles are normal in size and configuration. Status post right lens extraction. Otherwise the orbits are unremarkable. The visualized paranasal sinuses and mastoid air cells are clear. The visualized soft tissues are unremarkable. No acute osseous abnormality.     Impression: Impression: No acute intracranial abnormality. Electronically Signed: Yash Galvan DO  10/25/2024 5:50 PM EDT  Workstation ID: [id number]     Results for orders placed during the hospital encounter of 06/08/22    Adult Transthoracic Echo Complete W/ Cont if Necessary Per Protocol    Interpretation Summary  · Left ventricular ejection fraction appears to be 61 - 65%. Left ventricular systolic function is normal.  · Left ventricular diastolic function was indeterminate.  · There is mild calcification of the aortic valve.  · Estimated right ventricular systolic pressure from tricuspid regurgitation is normal (<35 mmHg). Calculated right ventricular systolic pressure from tricuspid regurgitation is 26 mmHg.  · Left atrial volume is mildly increased.      Assessment & Plan   Assessment & Plan       Failure to thrive in adult    UTI (urinary tract infection)    Coronary artery disease involving native coronary artery of native heart without angina pectoris    Dyslipidemia, goal LDL below 70    Essential hypertension    Cognitive decline    GERD without esophagitis      Monique Betts is a 89 y.o. female with a history of CAD, HTN, HLD, Cognitive decline, and chronic back pain who presents to [facility] ED for complaint of worsening generalized weakness and failure to thrive.     Failure to Thrive  Chronic Low back pain  -CT L spine only significant for chronic changes. MRI was ordered but unable to be obtained d/t presence of nerve  stimulator.   -PT/OT   -Will ask case management to see in the am to discuss options for placement at discharge.   -Pain control    Acute UTI?  -UA tonight somewhat suspicious for UTI, however does also appear contaminated. Will send for culture  -Received Ceftriaxone in the ED. Ok to continue for now pending results of culture  -Gentle IV fluids    CAD  HTN  HLD  -Chest pain free  -Takes     GERD  -PPI    Cognitive Decline  -Continue Aricept            DVT prophylaxis:  SCDS    CODE STATUS:  DNR/DNI  Medical Intervention Limits: No intubation (DNI)  Code Status (Patient has no pulse and is not breathing): No CPR (Do Not Attempt to Resuscitate)  Medical Interventions (Patient has pulse or is breathing): Limited Support      Expected Discharge 1-2 days       This note has been completed as part of a split-shared workflow.     Signature: Electronically signed by Ramiro Lundy PA-C, 10/25/24, 10:29 PM EDT          \

## 2024-10-26 NOTE — ED NOTES
Monique Betts    Nursing Report ED to Floor:  Mental status: A&O X4  Ambulatory status: up with 2  Oxygen Therapy:  2L NC  Cardiac Rhythm: Sinus marisol  Admitted from: ED  Safety Concerns:  none  Social Issues: none  ED Room #:  10    ED Nurse Phone Extension - [phone] or may call [phone].      HPI:   Chief Complaint   Patient presents with    Back Pain       Past Medical History:  Past Medical History:   Diagnosis Date    Cancer     Heart murmur 2000    Skin Cancer    Sleep apnea 1999    Not using machine for several years.        Past Surgical History:  Past Surgical History:   Procedure Laterality Date    CARDIAC CATHETERIZATION N/A 06/08/2022    Procedure: LEFT HEART CATH;  Surgeon: Antonino Koo MD;  Location:  [facility] CATH INVASIVE LOCATION;  Service: Cardiovascular;  Laterality: N/A;        Admitting Doctor:   Brian Joseph Kerley, DO    Consulting Provider(s):  Consults       No orders found from 9/26/2024 to 10/26/2024.             Admitting Diagnosis:   The primary encounter diagnosis was Acute on chronic low back pain. A diagnosis of Acute UTI was also pertinent to this visit.    Most Recent Vitals:   Vitals:    10/25/24 1908 10/25/24 1910 10/25/24 1930 10/25/24 1931   BP:       BP Location:       Patient Position:       Pulse:   (!) 49 50   Resp:       Temp:       TempSrc:       SpO2: (!) 89% (!) 89% 98% 96%   Weight:       Height:           Active LDAs/IV Access:   Lines, Drains & Airways       Active LDAs       Name Placement date Placement time Site Days    Peripheral IV 10/25/24 1807 Anterior;Right Forearm 10/25/24  1807  Forearm  less than 1                    Labs (abnormal labs have a star):   Labs Reviewed   COMPREHENSIVE METABOLIC PANEL - Abnormal; Notable for the following components:       Result Value    Glucose 169 (*)     eGFR 54.3 (*)     All other components within normal limits    Narrative:     GFR Normal >60  Chronic Kidney Disease <60  Kidney Failure <15    The GFR formula is only valid  "for adults with stable renal function between ages 18 and 70.   URINALYSIS W/ CULTURE IF INDICATED - Abnormal; Notable for the following components:    Ketones, UA 15 mg/dL (1+) (*)     Blood, UA Trace (*)     Protein, UA Trace (*)     Leuk Esterase, UA Moderate (2+) (*)     All other components within normal limits    Narrative:     In absence of clinical symptoms, the presence of pyuria, bacteria, and/or nitrites on the urinalysis result does not correlate with infection.   CBC WITH AUTO DIFFERENTIAL - Abnormal; Notable for the following components:    MCV 97.4 (*)     MPV 12.1 (*)     All other components within normal limits   URINALYSIS, MICROSCOPIC ONLY - Abnormal; Notable for the following components:    WBC, UA 6-10 (*)     Bacteria, UA 2+ (*)     Squamous Epithelial Cells, UA 3-6 (*)     All other components within normal limits   PROCALCITONIN - Normal    Narrative:     As a Marker for Sepsis (Non-Neonates):    1. <0.5 ng/mL represents a low risk of severe sepsis and/or septic shock.  2. >2 ng/mL represents a high risk of severe sepsis and/or septic shock.    As a Marker for Lower Respiratory Tract Infections that require antibiotic therapy:    PCT on Admission    Antibiotic Therapy       6-12 Hrs later    >0.5                Strongly Recommended  >0.25 - <0.5        Recommended   0.1 - 0.25          Discouraged              Remeasure/reassess PCT  <0.1                Strongly Discouraged     Remeasure/reassess PCT    As 28 day mortality risk marker: \"Change in Procalcitonin Result\" (>80% or <=80%) if Day 0 (or Day 1) and Day 4 values are available. Refer to http://www.[facility]-pct-calculator.com    Change in PCT <=80%  A decrease of PCT levels below or equal to 80% defines a positive change in PCT test result representing a higher risk for 28-day all-cause mortality of patients diagnosed with severe sepsis for septic shock.    Change in PCT >80%  A decrease of PCT levels of more than 80% defines a negative " change in PCT result representing a lower risk for 28-day all-cause mortality of patients diagnosed with severe sepsis or septic shock.      LACTIC ACID, PLASMA - Normal   URINE CULTURE   BLOOD CULTURE   BLOOD CULTURE   CBC AND DIFFERENTIAL    Narrative:     The following orders were created for panel order CBC & Differential.  Procedure                               Abnormality         Status                     ---------                               -----------         ------                     CBC Auto Differential[[id number]]        Abnormal            Final result                 Please view results for these tests on the individual orders.       Meds Given in ED:   Medications   sodium chloride 0.9 % flush 10 mL (has no administration in time range)   HYDROmorphone (DILAUDID) injection 0.25 mg (0.25 mg Intravenous Given 10/25/24 1821)   ondansetron (ZOFRAN) injection 4 mg (4 mg Intravenous Given 10/25/24 1820)   cefTRIAXone (ROCEPHIN) 1,000 mg in sodium chloride 0.9 % 100 mL MBP (0 mg Intravenous Stopped 10/25/24 1901)           Last NIH score:                                                          Dysphagia screening results:  Patient Factors Component (Dysphagia:Stroke or Rule-out)  Best Eye Response: 4-->(E4) spontaneous (10/25/24 1826)  Best Motor Response: 6-->(M6) obeys commands (10/25/24 1826)  Best Verbal Response: 5-->(V5) oriented (10/25/24 1826)  [place] Coma Scale Score: 15 (10/25/24 1826)     [place] Coma Scale:  No data recorded     CIWA:        Restraint Type:            Isolation Status:  No active isolations

## 2024-10-26 NOTE — PLAN OF CARE
Goal Outcome Evaluation:              Outcome Evaluation: Pt arrived to floor at beginning of shift. A&Ox4 however reports some forgetfulness at times. Pt reports pain in her left shoulder that has occured from using a walker. Prn tylenol given. Scds on. Continous pulse ox on. Maintenance fluids infusing. Pt reports she is unable to walk and currently has a purewick on. Vitals have remained stable. Pt on 2L nasal cannula due to low o2 sats in the ed. Plan of care ongoing.

## 2024-10-26 NOTE — PROGRESS NOTES
"    [facility] Medicine Services  PROGRESS NOTE    Patient Name: Monique Betts  : 1935  MRN: [id number]    Date of Admission: 10/25/2024  Primary Care Physician: Sena Lomeli MD    Subjective   Subjective     CC: inability to walk    HPI:  Reports legs \"wobbly\" over last while. Unsure time frame. Denies stool or urinary incontinence or saddle anesthesia to me. Denies fall or injury.  Reports using walker to get around last few days and then left shoulder pain from there into forearm  Is surprised to hear she had xrays of this days ago - does not know results    Prominent complaint this morning of left shoulder pain      Objective   Objective     Vital Signs:   Temp:  [96.5 °F (35.8 °C)-97.6 °F (36.4 °C)] 96.6 °F (35.9 °C)  Heart Rate:  [49-64] 53  Resp:  [18] 18  BP: (139-158)/(57-94) 145/69  Flow (L/min) (Oxygen Therapy):  [2] 2     Physical Exam:  Constitutional: No acute distress, awake, alert older female sitting up in bed. Left arm in 90 degree position, very limited movement intentionally  HENT: NCAT, mucous membranes moist  Respiratory: Clear to auscultation bilaterally, respiratory effort normal   Cardiovascular: RRR, +murmur present  Gastrointestinal: Soft, nontender, nondistended  Musculoskeletal: Full ROM LUE at elbow, limited at shoulder to 30 degrees.   Psychiatric: Appropriate affect, cooperative  Neurologic: Alert and oriented to self and current condition but cannot provide much history related to recent events, facial movements symmetric, speech clear. 5/5 leg strength bilaterally both distal and proximal without foot drop. Sensation also in tact bilateral LE (reports chronically diminished in distal bilateral LE and is at baseline)  Skin: No rashes    Results Reviewed:  LAB RESULTS:      Lab 10/26/24  0524 10/25/24  1808   WBC 4.94 6.39   HEMOGLOBIN 12.4 13.3   HEMATOCRIT 37.3 41.1   PLATELETS 177 203   NEUTROS ABS 1.69* 2.94   IMMATURE GRANS (ABS) 0.01 0.01 "   LYMPHS ABS 2.54 2.64   MONOS ABS 0.55 0.65   EOS ABS 0.12 0.09   MCV 97.4* 97.4*   PROCALCITONIN  --  0.06   LACTATE  --  1.1         Lab 10/26/24  0524 10/25/24  1808   SODIUM 141 142   POTASSIUM 4.3 4.1   CHLORIDE 105 105   CO2 28.0 28.0   ANION GAP 8.0 9.0   BUN 22 22   CREATININE 1.05* 1.00   EGFR 51.2* 54.3*   GLUCOSE 124* 169*   CALCIUM 8.8 9.6         Lab 10/25/24  1808   TOTAL PROTEIN 6.8   ALBUMIN 4.1   GLOBULIN 2.7   ALT (SGPT) 15   AST (SGOT) 21   BILIRUBIN 0.2   ALK PHOS 66                     Brief Urine Lab Results  (Last result in the past 365 days)        Color   Clarity   Blood   Leuk Est   Nitrite   Protein   CREAT   Urine HCG        10/25/24 1645 Yellow   Clear   Trace   Moderate (2+)   Negative   Trace                   Microbiology Results Abnormal       None            CT Lumbar Spine Without Contrast    Result Date: 10/25/2024  CT LUMBAR SPINE WO CONTRAST Date of Exam: 10/25/2024 5:36 PM EDT Indication: LE weakness, back pain. Comparison: CT abdomen and pelvis 4/23/2023 Technique: Axial CT images were obtained of the lumbar spine without contrast administration.  Reconstructed coronal and sagittal images were also obtained. Automated exposure control and iterative construction methods were used. Findings: Vertebral body heights are maintained. Unchanged grade 1 anterolisthesis of L4 on L5. Otherwise overall alignment is maintained. Mild loss of intervertebral disc base height and degenerative changes noted throughout the visualized spine. The visualized lung bases are clear. Mild calcification of the visualized abdominal aorta. Presumed cyst within the right hepatic lobe. Extensive colonic diverticulosis without evidence of acute diverticulitis. Otherwise the visualized intra-abdominal, intrapelvic and paravertebral soft tissues are unremarkable     Impression: Impression: No acute lumbar spine abnormality. Mild degenerative changes noted throughout the visualized spine, similar in  appearance to prior CT abdomen and pelvis. Electronically Signed: Yash DO Mandy  10/25/2024 5:52 PM EDT  Workstation ID: [id number]    CT Head Without Contrast    Result Date: 10/25/2024  CT HEAD WO CONTRAST Date of Exam: 10/25/2024 5:36 PM EDT Indication: dizziness. Comparison: 7/29/2024 Technique: Axial CT images were obtained of the head without contrast administration.  Automated exposure control and iterative construction methods were used. Findings: No large territory infarct. There is no evidence of hemorrhage. No mass effect, edema or midline shift Mild periventricular and subcortical white matter hypodensities, nonspecific but most likely represents chronic small vessel ischemic changes. No extra-axial fluid collection. The ventricles are normal in size and configuration. Status post right lens extraction. Otherwise the orbits are unremarkable. The visualized paranasal sinuses and mastoid air cells are clear. The visualized soft tissues are unremarkable. No acute osseous abnormality.     Impression: Impression: No acute intracranial abnormality. Electronically Signed: Yash DO Mandy  10/25/2024 5:50 PM EDT  Workstation ID: [id number]     Results for orders placed during the hospital encounter of 06/08/22    Adult Transthoracic Echo Complete W/ Cont if Necessary Per Protocol    Interpretation Summary  · Left ventricular ejection fraction appears to be 61 - 65%. Left ventricular systolic function is normal.  · Left ventricular diastolic function was indeterminate.  · There is mild calcification of the aortic valve.  · Estimated right ventricular systolic pressure from tricuspid regurgitation is normal (<35 mmHg). Calculated right ventricular systolic pressure from tricuspid regurgitation is 26 mmHg.  · Left atrial volume is mildly increased.      Current medications:  Scheduled Meds:aspirin, 81 mg, Oral, Daily  cefTRIAXone, 1,000 mg, Intravenous, Q24H  donepezil, 10 mg, Oral, Daily  heparin  (porcine), 5,000 Units, Subcutaneous, Q8H  pregabalin, 50 mg, Oral, TID  rosuvastatin, 10 mg, Oral, Daily  sodium chloride, 10 mL, Intravenous, Q12H      Continuous Infusions:   PRN Meds:.  acetaminophen **OR** acetaminophen **OR** acetaminophen    HYDROcodone-acetaminophen    melatonin    nitroglycerin    [COMPLETED] Insert Peripheral IV **AND** sodium chloride    sodium chloride    sodium chloride    Assessment & Plan   Assessment & Plan     Active Hospital Problems    Diagnosis  POA    **Failure to thrive in adult [R62.7]  Yes    UTI (urinary tract infection) [N39.0]  Yes    Cognitive decline [R41.89]  Yes    GERD without esophagitis [K21.9]  Yes    Dyslipidemia, goal LDL below 70 [E78.5]  Yes    Essential hypertension [I10]  Yes    Coronary artery disease involving native coronary artery of native heart without angina pectoris [I25.10]  Yes      Resolved Hospital Problems   No resolved problems to display.        Brief Hospital Course to date:  Monique Betts is a 89 y.o. female w CAD s/p PCI, cognitive decline, chronic back pain, chronic urinary incontinence s/p bladder stimulator who presented from PMR office 10/25 w inability to walk.  Limited note from Dr Chatman 10/26 - have HPI but not assessment to see exact reason/concern for transport.    Inability to walk  H/o chronic back pain, L5/S1 radiculopathy  -patient denies orthostasis, denies worsening back pain or saddle anesthesia on my exam  -ddx broad, challenging w limited history able to obtain: possible acute on chronic L-spine dz (though do not suspect cauda equina given exam + patient report currently, current 5/5 strength LE, CT spine also reassuring), L-shoulder injury contributing (see below), possible cardiac or pulm sx contributing to weakness, orthostasis  -MRI L-spine pending if bladder stim will allow but suspect lower yield. F/u w right SI jt infection per OP  plan  -obtain history from family (called son, left VM 10/26 AM)  -obtain  orthostats, f/u borderline bradycardia  -obtain ECHO  -CK check  -PT/OT to work w patient to see if we can better assess etiology of sx    Left shoulder pain  -denies trauma, will work to confirm history w son. Reportedly weeks per 10/21 OP visit  -Xray report 10/21 reviewed wo acute fracture appreciated  -re-obtain left shoulder xray for review images here. Cosnider further imaging for tendon/cuff tear    Heart murmur  -reviewed 2022 echo, may be from AV sclerosis  -obtain ECHO for eval    Possible UTI -appears less likely dx clinically, f/u urine culture  Bradycardia - f/u, hold donepezil if persistent/contributing to sx above    Chronic urinary incontinence s/p bladder stim - will need MRI pre-approval/screening  Cognitive impairment - limits assessment as above, donepezil. If bradycardia persists may need to hold  CAD s/p PCI - statin, aspirin    Called son x3 - contacted successfully x 1 and asked for me to call him again in a little while. Called back, no answer. Will need collateral information to help w baseline/etiology of current sx    Expected Discharge Location and Transportation: tbd pt/ot  Expected Discharge 10/29 (Discharge date is tentative pending patient's medical condition and is subject to change)  Expected Discharge Date: 10/29/2024; Expected Discharge Time:      VTE Prophylaxis:  Pharmacologic & mechanical VTE prophylaxis orders are present.         AM-PAC 6 Clicks Score (PT): 15 (10/25/24 6748)    CODE STATUS:   Code Status and Medical Interventions: No CPR (Do Not Attempt to Resuscitate); Limited Support; No intubation (DNI)   Ordered at: 10/25/24 2239     Medical Intervention Limits:    No intubation (DNI)     Code Status (Patient has no pulse and is not breathing):    No CPR (Do Not Attempt to Resuscitate)     Medical Interventions (Patient has pulse or is breathing):    Limited Support       Ruma García MD  10/26/24

## 2024-10-26 NOTE — CASE MANAGEMENT/SOCIAL WORK
Discharge Planning Assessment  [facility]     Patient Name: Monique Betts  MRN: [id number]  Today's Date: 10/26/2024    Admit Date: 10/25/2024    Plan: TBD   Discharge Needs Assessment       Row Name 10/26/24 1114       Living Environment    People in Home alone    Current Living Arrangements home    Potentially Unsafe Housing Conditions unable to assess    In the past 12 months has the electric, gas, oil, or water company threatened to shut off services in your home? No    Primary Care Provided by self    Provides Primary Care For no one    Family Caregiver if Needed child(milena), adult    Family Caregiver Names Vivek Betts Jr (son) [phone]    Quality of Family Relationships unable to assess    Able to Return to Prior Arrangements yes       Resource/Environmental Concerns    Resource/Environmental Concerns none    Transportation Concerns none       Transportation Needs    In the past 12 months, has lack of transportation kept you from medical appointments or from getting medications? no    In the past 12 months, has lack of transportation kept you from meetings, work, or from getting things needed for daily living? No       Food Insecurity    Within the past 12 months, you worried that your food would run out before you got the money to buy more. Never true    Within the past 12 months, the food you bought just didn't last and you didn't have money to get more. Never true       Transition Planning    Patient/Family Anticipates Transition to home    Patient/Family Anticipated Services at Transition     Transportation Anticipated family or friend will provide       Discharge Needs Assessment    Readmission Within the Last 30 Days no previous admission in last 30 days    Current Outpatient/Agency/Support Group homecare agency  [facility] services    Equipment Currently Used at Home shower chair;grab bar;cane, straight;walker, rolling;commode    Concerns to be Addressed discharge planning    Do  you want help finding or keeping work or a job? I do not need or want help    Do you want help with school or training? For example, starting or completing job training or getting a high school diploma, GED or equivalent No    Anticipated Changes Related to Illness none    Equipment Needed After Discharge cane, straight;commode;grab bar, tub/shower;shower chair;walker, rolling;other (see comments)    Outpatient/Agency/Support Group Needs homecare agency  [facility] services    Discharge Facility/Level of Care Needs other (see comments)  per therapy recommendations    Discharge Coordination/Progress I spoke with Pt, in room, to initiate discharge plan. Pt is admitted with failure to thrive. She lives alone in [facility]. There is one step at the entrance of the home and one step going into the den. Prior to admission, she ambulated independently with a rolling walker. She is independent with ADLs at baseline and has the following DME: shower chair, grab bars, straight cane, rolling walker, and BSC. Her PCP is Dr Sena Lomeli. She has Medicare and [employer] insurance which has Rx coverage. She uses [facility], [facility]. She states prescriptions are affordable. She denies HH/O2. She receives assistance from [facility] who performs light housekeeping. Discharge plan is dependent on PT/OT recommendations. Family will provide transportation at discharge. CM will continue to follow hospital course.                   Discharge Plan       Row Name 10/26/24 1124       Plan    Plan TBD    Final Discharge Disposition Code 01 - home or self-care                  Continued Care and Services - Admitted Since 10/25/2024    No active coordination exists for this encounter.       Expected Discharge Date and Time       Expected Discharge Date Expected Discharge Time    Oct 29, 2024            Demographic Summary       Row Name 10/26/24 1113       General Information    Arrived From home    Reason for Consult  discharge planning    Preferred Language English    General Information Comments PCP Dr Sena Lomeli       Contact Information    Permission Granted to Share Info With     Contact Information Obtained for     Contact Information Comments Vivek Betts Jr (son) [phone] or Iliana Yee (granddaughter) [phone]                   Functional Status       Row Name 10/26/24 1113       Functional Status    Usual Activity Tolerance moderate    Current Activity Tolerance fair       Functional Status, IADL    Medications independent    Meal Preparation independent    Housekeeping independent    Laundry independent    Shopping independent                   Psychosocial    No documentation.                  Abuse/Neglect    No documentation.                  Legal    No documentation.                  Substance Abuse    No documentation.                  Patient Forms    No documentation.                     Barbara Molina RN

## 2024-10-27 ENCOUNTER — APPOINTMENT (OUTPATIENT)
Dept: CARDIOLOGY | Facility: HOSPITAL | Age: 89
DRG: 552 | End: 2024-10-27
Payer: MEDICARE

## 2024-10-27 LAB
BH CV ECHO MEAS - AO MAX PG: 8.5 MMHG
BH CV ECHO MEAS - AO MEAN PG: 4 MMHG
BH CV ECHO MEAS - AO ROOT DIAM: 2 CM
BH CV ECHO MEAS - AO V2 MAX: 146 CM/SEC
BH CV ECHO MEAS - AO V2 VTI: 35.7 CM
BH CV ECHO MEAS - AVA(I,D): 1.96 CM2
BH CV ECHO MEAS - EDV(CUBED): 42.9 ML
BH CV ECHO MEAS - EDV(MOD-SP2): 56.6 ML
BH CV ECHO MEAS - EDV(MOD-SP4): 87.4 ML
BH CV ECHO MEAS - EF(MOD-BP): 56.5 %
BH CV ECHO MEAS - EF(MOD-SP2): 56.2 %
BH CV ECHO MEAS - EF(MOD-SP4): 60.4 %
BH CV ECHO MEAS - ESV(CUBED): 13.8 ML
BH CV ECHO MEAS - ESV(MOD-SP2): 24.8 ML
BH CV ECHO MEAS - ESV(MOD-SP4): 34.6 ML
BH CV ECHO MEAS - FS: 31.4 %
BH CV ECHO MEAS - IVS/LVPW: 1.1 CM
BH CV ECHO MEAS - IVSD: 1.1 CM
BH CV ECHO MEAS - LA DIMENSION: 3.8 CM
BH CV ECHO MEAS - LAT PEAK E' VEL: 10.7 CM/SEC
BH CV ECHO MEAS - LV MASS(C)D: 111 GRAMS
BH CV ECHO MEAS - LV MAX PG: 3.9 MMHG
BH CV ECHO MEAS - LV MEAN PG: 2 MMHG
BH CV ECHO MEAS - LV V1 MAX: 98.9 CM/SEC
BH CV ECHO MEAS - LV V1 VTI: 22.3 CM
BH CV ECHO MEAS - LVIDD: 3.5 CM
BH CV ECHO MEAS - LVIDS: 2.4 CM
BH CV ECHO MEAS - LVOT AREA: 3.1 CM2
BH CV ECHO MEAS - LVOT DIAM: 2 CM
BH CV ECHO MEAS - LVPWD: 1 CM
BH CV ECHO MEAS - MED PEAK E' VEL: 7.9 CM/SEC
BH CV ECHO MEAS - MV A MAX VEL: 92 CM/SEC
BH CV ECHO MEAS - MV DEC TIME: 0.24 SEC
BH CV ECHO MEAS - MV E MAX VEL: 81.9 CM/SEC
BH CV ECHO MEAS - MV E/A: 0.89
BH CV ECHO MEAS - MV MAX PG: 3.2 MMHG
BH CV ECHO MEAS - MV MEAN PG: 3 MMHG
BH CV ECHO MEAS - MV V2 VTI: 29.7 CM
BH CV ECHO MEAS - MVA(VTI): 2.36 CM2
BH CV ECHO MEAS - PA ACC TIME: 0.13 SEC
BH CV ECHO MEAS - RAP SYSTOLE: 3 MMHG
BH CV ECHO MEAS - RVSP: 22.5 MMHG
BH CV ECHO MEAS - SV(LVOT): 70.1 ML
BH CV ECHO MEAS - SV(MOD-SP2): 31.8 ML
BH CV ECHO MEAS - SV(MOD-SP4): 52.8 ML
BH CV ECHO MEAS - TAPSE (>1.6): 2.9 CM
BH CV ECHO MEAS - TR MAX PG: 19.5 MMHG
BH CV ECHO MEAS - TR MAX VEL: 221 CM/SEC
BH CV ECHO MEASUREMENTS AVERAGE E/E' RATIO: 8.81
BH CV XLRA - RV BASE: 3.7 CM
BH CV XLRA - RV LENGTH: 6.4 CM
BH CV XLRA - RV MID: 3.3 CM
BH CV XLRA - TDI S': 9.3 CM/SEC
LEFT ATRIUM VOLUME INDEX: 20.3 ML/M2
QT INTERVAL: 438 MS
QTC INTERVAL: 433 MS

## 2024-10-27 PROCEDURE — 93306 TTE W/DOPPLER COMPLETE: CPT

## 2024-10-27 PROCEDURE — 25010000002 HEPARIN (PORCINE) PER 1000 UNITS: Performed by: INTERNAL MEDICINE

## 2024-10-27 PROCEDURE — 93306 TTE W/DOPPLER COMPLETE: CPT | Performed by: INTERNAL MEDICINE

## 2024-10-27 PROCEDURE — 25010000002 CEFTRIAXONE PER 250 MG: Performed by: PHYSICIAN ASSISTANT

## 2024-10-27 PROCEDURE — 97530 THERAPEUTIC ACTIVITIES: CPT

## 2024-10-27 PROCEDURE — 97162 PT EVAL MOD COMPLEX 30 MIN: CPT

## 2024-10-27 PROCEDURE — 99232 SBSQ HOSP IP/OBS MODERATE 35: CPT | Performed by: INTERNAL MEDICINE

## 2024-10-27 PROCEDURE — 25010000002 ONDANSETRON PER 1 MG: Performed by: INTERNAL MEDICINE

## 2024-10-27 RX ORDER — AMOXICILLIN 250 MG
2 CAPSULE ORAL 2 TIMES DAILY
Status: DISCONTINUED | OUTPATIENT
Start: 2024-10-27 | End: 2024-11-04 | Stop reason: HOSPADM

## 2024-10-27 RX ORDER — BISACODYL 10 MG
10 SUPPOSITORY, RECTAL RECTAL DAILY PRN
Status: DISCONTINUED | OUTPATIENT
Start: 2024-10-27 | End: 2024-11-04 | Stop reason: HOSPADM

## 2024-10-27 RX ORDER — HYDROXYZINE HYDROCHLORIDE 25 MG/1
25 TABLET, FILM COATED ORAL 3 TIMES DAILY PRN
Status: DISCONTINUED | OUTPATIENT
Start: 2024-10-27 | End: 2024-11-04 | Stop reason: HOSPADM

## 2024-10-27 RX ORDER — BISACODYL 5 MG/1
5 TABLET, DELAYED RELEASE ORAL DAILY PRN
Status: DISCONTINUED | OUTPATIENT
Start: 2024-10-27 | End: 2024-11-04 | Stop reason: HOSPADM

## 2024-10-27 RX ORDER — POLYETHYLENE GLYCOL 3350 17 G/17G
17 POWDER, FOR SOLUTION ORAL DAILY PRN
Status: DISCONTINUED | OUTPATIENT
Start: 2024-10-27 | End: 2024-11-04 | Stop reason: HOSPADM

## 2024-10-27 RX ADMIN — HYDROCODONE BITARTRATE AND ACETAMINOPHEN 1 TABLET: 7.5; 325 TABLET ORAL at 05:25

## 2024-10-27 RX ADMIN — DONEPEZIL HYDROCHLORIDE 10 MG: 10 TABLET, FILM COATED ORAL at 09:04

## 2024-10-27 RX ADMIN — LOSARTAN POTASSIUM 25 MG: 25 TABLET, FILM COATED ORAL at 09:04

## 2024-10-27 RX ADMIN — HYDROCODONE BITARTRATE AND ACETAMINOPHEN 1 TABLET: 7.5; 325 TABLET ORAL at 20:38

## 2024-10-27 RX ADMIN — ONDANSETRON 4 MG: 2 INJECTION INTRAMUSCULAR; INTRAVENOUS at 06:19

## 2024-10-27 RX ADMIN — HEPARIN SODIUM 5000 UNITS: 5000 INJECTION INTRAVENOUS; SUBCUTANEOUS at 21:05

## 2024-10-27 RX ADMIN — HYDROXYZINE HYDROCHLORIDE 25 MG: 25 TABLET ORAL at 14:56

## 2024-10-27 RX ADMIN — SODIUM CHLORIDE 1000 MG: 900 INJECTION INTRAVENOUS at 12:44

## 2024-10-27 RX ADMIN — POLYETHYLENE GLYCOL 3350 17 G: 17 POWDER, FOR SOLUTION ORAL at 14:56

## 2024-10-27 RX ADMIN — Medication 10 ML: at 09:04

## 2024-10-27 RX ADMIN — PREGABALIN 50 MG: 25 CAPSULE ORAL at 18:08

## 2024-10-27 RX ADMIN — ROSUVASTATIN 10 MG: 10 TABLET, FILM COATED ORAL at 09:04

## 2024-10-27 RX ADMIN — ACETAMINOPHEN 650 MG: 325 TABLET ORAL at 07:22

## 2024-10-27 RX ADMIN — Medication 5 MG: at 20:22

## 2024-10-27 RX ADMIN — PREGABALIN 50 MG: 25 CAPSULE ORAL at 20:26

## 2024-10-27 RX ADMIN — Medication 10 ML: at 20:23

## 2024-10-27 RX ADMIN — SENNOSIDES AND DOCUSATE SODIUM 2 TABLET: 50; 8.6 TABLET ORAL at 20:22

## 2024-10-27 RX ADMIN — ASPIRIN 81 MG 81 MG: 81 TABLET ORAL at 09:04

## 2024-10-27 RX ADMIN — PREGABALIN 50 MG: 25 CAPSULE ORAL at 09:04

## 2024-10-27 RX ADMIN — HEPARIN SODIUM 5000 UNITS: 5000 INJECTION INTRAVENOUS; SUBCUTANEOUS at 05:19

## 2024-10-27 RX ADMIN — HEPARIN SODIUM 5000 UNITS: 5000 INJECTION INTRAVENOUS; SUBCUTANEOUS at 14:03

## 2024-10-27 NOTE — PLAN OF CARE
Goal Outcome Evaluation:  Plan of Care Reviewed With: patient        Progress: no change  Outcome Evaluation: PT initial evaluation completed. Pt demonstrates generalized weakness and decreased independence and functional endurance re: mobility tasks, warranting further skilled PT services to promote PLOF. Limited today by fatigue but able to ambulate 8 ft with RW, min A. Recommend IP rehab at d/c.    Anticipated Discharge Disposition (PT): inpatient rehabilitation facility

## 2024-10-27 NOTE — PROGRESS NOTES
[facility] Medicine Services  PROGRESS NOTE    Patient Name: Monique Betts  : 1935  MRN: [id number]    Date of Admission: 10/25/2024  Primary Care Physician: Sena Lomeli MD    Subjective   Subjective     CC: Follow-up weakness    HPI: Patient said that she continues to have a rough night, complaining of lower extremity weakness and left shoulder discomfort      Objective   Objective     Vital Signs:   Temp:  [97 °F (36.1 °C)-98.7 °F (37.1 °C)] 98.7 °F (37.1 °C)  Heart Rate:  [53-60] 58  Resp:  [16-22] 18  BP: (156-203)/() 167/72  Flow (L/min) (Oxygen Therapy):  [2] 2     Physical Exam:  Constitutional: Chronically ill-appearing elderly female, seated in chair  HENT: NCAT, mucous membranes moist  Respiratory: Clear to auscultation bilaterally, respiratory effort normal   Cardiovascular: RRR, no murmurs, rubs, or gallops  Gastrointestinal: Positive bowel sounds, soft, nontender, nondistended  Musculoskeletal: No bilateral ankle edema  Psychiatric: Appropriate affect, cooperative  Neurologic: Oriented x 3, nonfocal    Results Reviewed:  LAB RESULTS:      Lab 10/26/24  0524 10/25/24  1808   WBC 4.94 6.39   HEMOGLOBIN 12.4 13.3   HEMATOCRIT 37.3 41.1   PLATELETS 177 203   NEUTROS ABS 1.69* 2.94   IMMATURE GRANS (ABS) 0.01 0.01   LYMPHS ABS 2.54 2.64   MONOS ABS 0.55 0.65   EOS ABS 0.12 0.09   MCV 97.4* 97.4*   PROCALCITONIN  --  0.06   LACTATE  --  1.1         Lab 10/26/24  0524 10/25/24  1808   SODIUM 141 142   POTASSIUM 4.3 4.1   CHLORIDE 105 105   CO2 28.0 28.0   ANION GAP 8.0 9.0   BUN 22 22   CREATININE 1.05* 1.00   EGFR 51.2* 54.3*   GLUCOSE 124* 169*   CALCIUM 8.8 9.6         Lab 10/25/24  1808   TOTAL PROTEIN 6.8   ALBUMIN 4.1   GLOBULIN 2.7   ALT (SGPT) 15   AST (SGOT) 21   BILIRUBIN 0.2   ALK PHOS 66                     Brief Urine Lab Results  (Last result in the past 365 days)        Color   Clarity   Blood   Leuk Est   Nitrite   Protein   CREAT   Urine HCG         10/25/24 1645 Yellow   Clear   Trace   Moderate (2+)   Negative   Trace                   Microbiology Results Abnormal       Procedure Component Value - Date/Time    Blood Culture - Blood, Arm, Left [[id number]]  (Normal) Collected: 10/25/24 1758    Lab Status: Preliminary result Specimen: Blood from Arm, Left Updated: 10/26/24 1832     Blood Culture No growth at 24 hours    Narrative:      Less than seven (7) mL's of blood was collected.  Insufficient quantity may yield false negative results.    Blood Culture - Blood, Arm, Right [[id number]]  (Normal) Collected: 10/25/24 1808    Lab Status: Preliminary result Specimen: Blood from Arm, Right Updated: 10/26/24 1832     Blood Culture No growth at 24 hours    Narrative:      Less than seven (7) mL's of blood was collected.  Insufficient quantity may yield false negative results.    Urine Culture - Urine, Urine, Clean Catch [[id number]] Collected: 10/25/24 1645    Lab Status: Final result Specimen: Urine, Clean Catch Updated: 10/26/24 1426     Urine Culture >100,000 CFU/mL Mixed Nica Isolated    Narrative:      Specimen contains mixed organisms of questionable pathogenicity suggestive of contamination. If symptoms persist, suggest recollection.  Colonization of the urinary tract without infection is common. Treatment is discouraged unless the patient is symptomatic, pregnant, or undergoing an invasive urologic procedure.            XR Shoulder 2+ View Left    Result Date: 10/26/2024  XR SHOULDER 2+ VW LEFT Date of Exam: 10/26/2024 9:33 AM EDT Indication: limited ROM left shoulder Comparison: None available. Findings: Left shoulder joint appears anatomically aligned and bony structures appear to be intact. There is relatively mild glenohumeral and AC joint DJD, for the patient's age. No fracture, avulsion, destructive or blastic bony lesion is seen. Included bones of the thorax appear clear. No significant left lung disease is seen.     Impression: Impression: Mild  left shoulder joint DJD, not unusual for age. No evidence of acute or healing left shoulder trauma. Electronically Signed: Mil Ochoa MD  10/26/2024 1:31 PM EDT  Workstation ID: [id number]    CT Lumbar Spine Without Contrast    Result Date: 10/25/2024  CT LUMBAR SPINE WO CONTRAST Date of Exam: 10/25/2024 5:36 PM EDT Indication: LE weakness, back pain. Comparison: CT abdomen and pelvis 4/23/2023 Technique: Axial CT images were obtained of the lumbar spine without contrast administration.  Reconstructed coronal and sagittal images were also obtained. Automated exposure control and iterative construction methods were used. Findings: Vertebral body heights are maintained. Unchanged grade 1 anterolisthesis of L4 on L5. Otherwise overall alignment is maintained. Mild loss of intervertebral disc base height and degenerative changes noted throughout the visualized spine. The visualized lung bases are clear. Mild calcification of the visualized abdominal aorta. Presumed cyst within the right hepatic lobe. Extensive colonic diverticulosis without evidence of acute diverticulitis. Otherwise the visualized intra-abdominal, intrapelvic and paravertebral soft tissues are unremarkable     Impression: Impression: No acute lumbar spine abnormality. Mild degenerative changes noted throughout the visualized spine, similar in appearance to prior CT abdomen and pelvis. Electronically Signed: Yash Galvan DO  10/25/2024 5:52 PM EDT  Workstation ID: [id number]    CT Head Without Contrast    Result Date: 10/25/2024  CT HEAD WO CONTRAST Date of Exam: 10/25/2024 5:36 PM EDT Indication: dizziness. Comparison: 7/29/2024 Technique: Axial CT images were obtained of the head without contrast administration.  Automated exposure control and iterative construction methods were used. Findings: No large territory infarct. There is no evidence of hemorrhage. No mass effect, edema or midline shift Mild periventricular and subcortical white matter  hypodensities, nonspecific but most likely represents chronic small vessel ischemic changes. No extra-axial fluid collection. The ventricles are normal in size and configuration. Status post right lens extraction. Otherwise the orbits are unremarkable. The visualized paranasal sinuses and mastoid air cells are clear. The visualized soft tissues are unremarkable. No acute osseous abnormality.     Impression: Impression: No acute intracranial abnormality. Electronically Signed: Yash Galvan DO  10/25/2024 5:50 PM EDT  Workstation ID: [id number]     Results for orders placed during the hospital encounter of 06/08/22    Adult Transthoracic Echo Complete W/ Cont if Necessary Per Protocol    Interpretation Summary  · Left ventricular ejection fraction appears to be 61 - 65%. Left ventricular systolic function is normal.  · Left ventricular diastolic function was indeterminate.  · There is mild calcification of the aortic valve.  · Estimated right ventricular systolic pressure from tricuspid regurgitation is normal (<35 mmHg). Calculated right ventricular systolic pressure from tricuspid regurgitation is 26 mmHg.  · Left atrial volume is mildly increased.      Current medications:  Scheduled Meds:aspirin, 81 mg, Oral, Daily  cefTRIAXone, 1,000 mg, Intravenous, Q24H  donepezil, 10 mg, Oral, Daily  heparin (porcine), 5,000 Units, Subcutaneous, Q8H  losartan, 25 mg, Oral, Q24H  pregabalin, 50 mg, Oral, TID  rosuvastatin, 10 mg, Oral, Daily  sodium chloride, 10 mL, Intravenous, Q12H      Continuous Infusions:   PRN Meds:.  acetaminophen **OR** acetaminophen **OR** acetaminophen    HYDROcodone-acetaminophen    HYDROcodone-acetaminophen    melatonin    nitroglycerin    ondansetron    ondansetron ODT    [COMPLETED] Insert Peripheral IV **AND** sodium chloride    sodium chloride    sodium chloride    Assessment & Plan   Assessment & Plan     Active Hospital Problems    Diagnosis  POA    **Failure to thrive in adult [R62.7]   Yes    UTI (urinary tract infection) [N39.0]  Yes    Cognitive decline [R41.89]  Yes    GERD without esophagitis [K21.9]  Yes    Dyslipidemia, goal LDL below 70 [E78.5]  Yes    Essential hypertension [I10]  Yes    Coronary artery disease involving native coronary artery of native heart without angina pectoris [I25.10]  Yes      Resolved Hospital Problems   No resolved problems to display.        Brief Hospital Course to date:  Monique Betts is a 89 y.o. female w CAD s/p PCI, cognitive decline, chronic back pain, chronic urinary incontinence s/p bladder stimulator who presented from PMR office 10/25 w inability to walk.  Limited note from Dr Chatman 10/26 - have HPI but not assessment to see exact reason/concern for transport.     This patient's problems and plans were partially entered by my partner and updated as appropriate by me 10/27/24.     Inability to walk  H/o chronic back pain, L5/S1 radiculopathy  -patient denies orthostasis, denies worsening back pain or saddle anesthesia   -ddx broad, challenging w limited history able to obtain: possible acute on chronic L-spine dz (though do not suspect cauda equina given exam + patient report currently, current 5/5 strength LE, CT spine also reassuring), L-shoulder injury contributing (see below), possible cardiac or pulm sx contributing to weakness, orthostasis  -MRI L-spine pending if bladder stim will allow but suspect lower yield. F/u w right SI jt infection per OP  plan  -Plan to contact son to obtain more history today   -obtain orthostats, f/u borderline bradycardia  -Follow-up echo  -PT/OT to work w patient to see if we can better assess etiology of sx     Left shoulder pain  -denies trauma, will work to confirm history w son. Reportedly weeks per 10/21 OP visit  -Xray report 10/21 reviewed wo acute fracture appreciated  -Shoulder x-ray with mild shoulder joint DJD no evidence of any acute process      Heart murmur  -reviewed 2022 echo, may be from AV  sclerosis  -Follow-up echo     Possible UTI -appears less likely dx clinically, urine cultures NGTD    Bradycardia - f/u, hold donepezil if persistent/contributing to sx above     Chronic urinary incontinence s/p bladder stim - will need MRI pre-approval/screening    Cognitive impairment - limits assessment as above, donepezil. If bradycardia persists may need to hold  CAD s/p PCI - statin, aspirin     All problems listed above are new to me today    Addendum:  I was able to get  a hold of Patient's son Bill. He states that patient has been having issues for the last couple of years, has had chronic back pain getting frequent injections, these however have seized to help, son denies any recent falls. Patient lives alone, she is still driving, is a daily etoh drinker ([employer], [employer]). They anticipate she will need more help.? rehab    Expected Discharge Location and Transportation: TBD  Expected Discharge   Expected Discharge Date: 10/29/2024; Expected Discharge Time:      VTE Prophylaxis:  Pharmacologic & mechanical VTE prophylaxis orders are present.       AM-PAC 6 Clicks Score (PT): 14 (10/26/24 2429)    CODE STATUS:   Code Status and Medical Interventions: No CPR (Do Not Attempt to Resuscitate); Limited Support; No intubation (DNI)   Ordered at: 10/25/24 9884     Medical Intervention Limits:    No intubation (DNI)     Code Status (Patient has no pulse and is not breathing):    No CPR (Do Not Attempt to Resuscitate)     Medical Interventions (Patient has pulse or is breathing):    Limited Support       Karyna Zamora MD  10/27/24

## 2024-10-27 NOTE — PLAN OF CARE
Problem: Adult Inpatient Plan of Care  Goal: Plan of Care Review  Outcome: Progressing  Goal: Patient-Specific Goal (Individualized)  Outcome: Progressing  Goal: Absence of Hospital-Acquired Illness or Injury  Outcome: Progressing  Intervention: Identify and Manage Fall Risk  Recent Flowsheet Documentation  Taken 10/27/2024 0200 by Omayra Tesfaye RN  Safety Promotion/Fall Prevention:   safety round/check completed   toileting scheduled   activity supervised   assistive device/personal items within reach   clutter free environment maintained   fall prevention program maintained  Taken 10/27/2024 0000 by Omayra Tesfaye RN  Safety Promotion/Fall Prevention:   safety round/check completed   toileting scheduled   activity supervised   assistive device/personal items within reach   clutter free environment maintained   fall prevention program maintained  Taken 10/26/2024 2200 by Omayra Tesfaye RN  Safety Promotion/Fall Prevention:   safety round/check completed   toileting scheduled   activity supervised   assistive device/personal items within reach   clutter free environment maintained   fall prevention program maintained  Intervention: Prevent Skin Injury  Recent Flowsheet Documentation  Taken 10/27/2024 0200 by Omayra Tesfaye RN  Body Position: position changed independently  Taken 10/27/2024 0000 by Omayra Tesfaye RN  Body Position: position changed independently  Taken 10/26/2024 2200 by Omayra Tesfaye RN  Body Position: position changed independently  Taken 10/26/2024 1954 by Omayra Tesfaye RN  Body Position: supine  Goal: Optimal Comfort and Wellbeing  Outcome: Progressing  Goal: Readiness for Transition of Care  Outcome: Progressing     Problem: Skin Injury Risk Increased  Goal: Skin Health and Integrity  Outcome: Progressing  Intervention: Optimize Skin Protection  Recent Flowsheet Documentation  Taken 10/27/2024 0200 by Omayra Tesfaye RN  Activity Management: activity minimized  Head of Bed (HOB) Positioning:  HOB elevated  Taken 10/27/2024 0000 by Omayra Tesfaye RN  Activity Management: activity minimized  Head of Bed (HOB) Positioning: HOB elevated  Taken 10/26/2024 2200 by Omayra Tesfaye RN  Activity Management: activity encouraged  Head of Bed (HOB) Positioning: HOB elevated  Taken 10/26/2024 1954 by Omayra Tesfaye RN  Head of Bed (HOB) Positioning: HOB elevated     Problem: Fall Injury Risk  Goal: Absence of Fall and Fall-Related Injury  Outcome: Progressing  Intervention: Promote Injury-Free Environment  Recent Flowsheet Documentation  Taken 10/27/2024 0200 by Omayra Tesfaye RN  Safety Promotion/Fall Prevention:   safety round/check completed   toileting scheduled   activity supervised   assistive device/personal items within reach   clutter free environment maintained   fall prevention program maintained  Taken 10/27/2024 0000 by Omyara Tesfaye RN  Safety Promotion/Fall Prevention:   safety round/check completed   toileting scheduled   activity supervised   assistive device/personal items within reach   clutter free environment maintained   fall prevention program maintained  Taken 10/26/2024 2200 by Omayra Tesfaye RN  Safety Promotion/Fall Prevention:   safety round/check completed   toileting scheduled   activity supervised   assistive device/personal items within reach   clutter free environment maintained   fall prevention program maintained   Goal Outcome Evaluation:

## 2024-10-27 NOTE — THERAPY EVALUATION
Patient Name: Monique Betts  : 1935    MRN: [id number]                              Today's Date: 10/27/2024       Admit Date: 10/25/2024    Visit Dx:     ICD-10-CM ICD-9-CM   1. Acute on chronic low back pain  M54.50 724.2    G89.29 338.19     338.29   2. Acute UTI  N39.0 599.0     Patient Active Problem List   Diagnosis    NSTEMI (non-ST elevated myocardial infarction)    COVID-19 virus infection    Coronary artery disease involving native coronary artery of native heart without angina pectoris    Dyslipidemia, goal LDL below 70    Essential hypertension    UTI (urinary tract infection)    Failure to thrive in adult    Cognitive decline    GERD without esophagitis     Past Medical History:   Diagnosis Date    Cancer     Heart murmur     Skin Cancer    Sleep apnea     Not using machine for several years.     Past Surgical History:   Procedure Laterality Date    CARDIAC CATHETERIZATION N/A 2022    Procedure: LEFT HEART CATH;  Surgeon: Antonino Koo MD;  Location: [facility] CATH INVASIVE LOCATION;  Service: Cardiovascular;  Laterality: N/A;      General Information       Row Name 10/27/24 0906          Physical Therapy Time and Intention    Document Type evaluation  -LS     Mode of Treatment physical therapy  -LS       Row Name 10/27/24 0906          General Information    Patient Profile Reviewed yes  -LS     Prior Level of Function independent:;all household mobility;ADL's  -LS     Existing Precautions/Restrictions fall;other (see comments)  LUE/ shoulder pain with movement  -LS     Barriers to Rehab medically complex  -LS       Row Name 10/27/24 0906          Living Environment    People in Home alone  -LS       Row Name 10/27/24 09          Home Main Entrance    Number of Stairs, Main Entrance one  -LS       Row Name 10/27/24 0906          Stairs Within Home, Primary    Number of Stairs, Within Home, Primary none  -LS       Row Name 10/27/24 0906          Cognition    Orientation  Status (Cognition) oriented to;person;place;verbal cues/prompts needed for orientation;situation;time  -LS       Row Name 10/27/24 0906          Safety Issues/Impairments Affecting Functional Mobility    Impairments Affecting Function (Mobility) balance;endurance/activity tolerance;pain;strength;sensation/sensory awareness;motor control;shortness of breath;postural/trunk control  -LS               User Key  (r) = Recorded By, (t) = Taken By, (c) = Cosigned By      Initials Name Provider Type     Joseline Norris, PT Physical Therapist                   Mobility       Row Name 10/27/24 0907          Bed Mobility    Supine-Sit [place] (Bed Mobility) contact guard;verbal cues  -LS     Assistive Device (Bed Mobility) bed rails;head of bed elevated  -       Row Name 10/27/24 0907          Sit-Stand Transfer    Sit-Stand [place] (Transfers) minimum assist (75% patient effort);verbal cues;1 person assist  -LS     Assistive Device (Sit-Stand Transfers) walker, front-wheeled  -LS       Row Name 10/27/24 0907          Gait/Stairs (Locomotion)    [place] Level (Gait) minimum assist (75% patient effort);verbal cues  -LS     Assistive Device (Gait) walker, front-wheeled  -LS     Patient was able to Ambulate yes  -LS     Distance in Feet (Gait) 8  -LS     Deviations/Abnormal Patterns (Gait) bilateral deviations  -LS     Bilateral Gait Deviations forward flexed posture;heel strike decreased  -LS     Comment, (Gait/Stairs) VC for upright posture and appropriate negotiation of RW. Distance limited by fatigue.  -               User Key  (r) = Recorded By, (t) = Taken By, (c) = Cosigned By      Initials Name Provider Type    LS Joseline Norris, PT Physical Therapist                   Obj/Interventions       Row Name 10/27/24 0908          Range of Motion Comprehensive    General Range of Motion bilateral lower extremity ROM WFL  -LS       Row Name 10/27/24 0908          Strength Comprehensive (MMT)    General Manual  Muscle Testing (MMT) Assessment lower extremity strength deficits identified  -LS     Comment, General Manual Muscle Testing (MMT) Assessment BLE grossly 3+/5  -LS       Row Name 10/27/24 0908          Balance    Balance Assessment sitting static balance;standing static balance  -LS     Static Sitting Balance standby assist  -LS     Position, Sitting Balance unsupported;sitting edge of bed  -LS     Static Standing Balance minimal assist  -LS     Position/Device Used, Standing Balance supported;walker, front-wheeled  -LS       Row Name 10/27/24 0908          Sensory Assessment (Somatosensory)    Sensory Assessment (Somatosensory) other (see comments)  baseline peripheral neuropathy; able to localize LT BLE  -LS               User Key  (r) = Recorded By, (t) = Taken By, (c) = Cosigned By      Initials Name Provider Type    LS Joseline Norris, PT Physical Therapist                   Goals/Plan       Row Name 10/27/24 0912          Bed Mobility Goal 1 (PT)    Activity/Assistive Device (Bed Mobility Goal 1, PT) sit to supine/supine to sit  -LS     [place] Level/Cues Needed (Bed Mobility Goal 1, PT) independent  -LS     Time Frame (Bed Mobility Goal 1, PT) 1 week;short term goal (STG)  -LS       Row Name 10/27/24 0912          Transfer Goal 1 (PT)    Activity/Assistive Device (Transfer Goal 1, PT) sit-to-stand/stand-to-sit  -LS     [place] Level/Cues Needed (Transfer Goal 1, PT) supervision required  -LS     Time Frame (Transfer Goal 1, PT) 10 days;long term goal (LTG)  -LS       Row Name 10/27/24 0912          Gait Training Goal 1 (PT)    Activity/Assistive Device (Gait Training Goal 1, PT) gait (walking locomotion);walker, rolling  -LS     [place] Level (Gait Training Goal 1, PT) supervision required  -LS     Distance (Gait Training Goal 1, PT) 150  -LS     Time Frame (Gait Training Goal 1, PT) 10 days;long term goal (LTG)  -LS       Row Name 10/27/24 0912          Stairs Goal 1 (PT)    Activity/Assistive  Device (Stairs Goal 1, PT) ascending stairs;descending stairs  -     [place] Level/Cues Needed (Stairs Goal 1, PT) contact guard required  -LS     Number of Stairs (Stairs Goal 1, PT) 1  -LS     Time Frame (Stairs Goal 1, PT) 10 days;long term goal (LTG)  -       Row Name 10/27/24 0912          Therapy Assessment/Plan (PT)    Planned Therapy Interventions (PT) balance training;bed mobility training;gait training;home exercise program;stair training;strengthening;patient/family education  -               User Key  (r) = Recorded By, (t) = Taken By, (c) = Cosigned By      Initials Name Provider Type    LS Joseline Norris, PT Physical Therapist                   Clinical Impression       Row Name 10/27/24 0909          Pain    Pretreatment Pain Rating 0/10 - no pain  -LS     Posttreatment Pain Rating 0/10 - no pain  -LS     Pre/Posttreatment Pain Comment pt denies pain at rest but reports LUE pain with movement; RN present and aware  -       Row Name 10/27/24 0909          Plan of Care Review    Plan of Care Reviewed With patient  -     Progress no change  -     Outcome Evaluation PT initial evaluation completed. Pt demonstrates generalized weakness and decreased independence and functional endurance re: mobility tasks, warranting further skilled PT services to promote PLOF. Limited today by fatigue but able to ambulate 8 ft with RW, min A. Recommend IP rehab at d/c.  -       Row Name 10/27/24 0909          Therapy Assessment/Plan (PT)    Patient/Family Therapy Goals Statement (PT) return to PLOF  -     Rehab Potential (PT) good  -LS     Criteria for Skilled Interventions Met (PT) yes;skilled treatment is necessary  -     Therapy Frequency (PT) daily  -LS     Predicted Duration of Therapy Intervention (PT) 10 days  -       Row Name 10/27/24 0909          Vital Signs    Pre Systolic BP Rehab 167  -LS     Pre Treatment Diastolic BP 72  -LS     Pretreatment Heart Rate (beats/min) 59  -LS      Posttreatment Heart Rate (beats/min) 60  -LS     Pre SpO2 (%) 99  -LS     O2 Delivery Pre Treatment nasal cannula  -LS     O2 Delivery Intra Treatment nasal cannula  -LS     Post SpO2 (%) 99  -LS     O2 Delivery Post Treatment nasal cannula  -LS     Pre Patient Position Supine  -LS     Intra Patient Position Standing  -LS     Post Patient Position Sitting  -LS       Row Name 10/27/24 0909          Positioning and Restraints    Pre-Treatment Position in bed  -LS     Post Treatment Position chair  -LS     In Chair notified nsg;reclined;call light within reach;encouraged to call for assist;waffle cushion;legs elevated;exit alarm on;with family/caregiver;with other staff  MD present  -               User Key  (r) = Recorded By, (t) = Taken By, (c) = Cosigned By      Initials Name Provider Type    Joseline Looney, PT Physical Therapist                   Outcome Measures       Row Name 10/27/24 0913          How much help from another person do you currently need...    Turning from your back to your side while in flat bed without using bedrails? 3  -LS     Moving from lying on back to sitting on the side of a flat bed without bedrails? 3  -LS     Moving to and from a bed to a chair (including a wheelchair)? 3  -LS     Standing up from a chair using your arms (e.g., wheelchair, bedside chair)? 3  -LS     Climbing 3-5 steps with a railing? 1  -LS     To walk in hospital room? 3  -LS     AM-PAC 6 Clicks Score (PT) 16  -LS               User Key  (r) = Recorded By, (t) = Taken By, (c) = Cosigned By      Initials Name Provider Type    Joseline Looney, PT Physical Therapist                                 Physical Therapy Education       Title: PT OT SLP Therapies (In Progress)       Topic: Physical Therapy (In Progress)       Point: Mobility training (Done)       Learning Progress Summary            Patient Acceptance, E,D, VU,NR by XU at 10/27/2024 0913                      Point: Home exercise program (Not Started)        Learner Progress:  Not documented in this visit.              Point: Body mechanics (Done)       Learning Progress Summary            Patient Acceptance, E,D, VU,NR by  at 10/27/2024 0913                      Point: Precautions (Done)       Learning Progress Summary            Patient Acceptance, E,D, VU,NR by  at 10/27/2024 0913                                      User Key       Initials Effective Dates Name Provider Type Discipline     02/03/23 -  Joseline Norris, PT Physical Therapist PT                  PT Recommendation and Plan  Planned Therapy Interventions (PT): balance training, bed mobility training, gait training, home exercise program, stair training, strengthening, patient/family education  Progress: no change  Outcome Evaluation: PT initial evaluation completed. Pt demonstrates generalized weakness and decreased independence and functional endurance re: mobility tasks, warranting further skilled PT services to promote PLOF. Limited today by fatigue but able to ambulate 8 ft with RW, min A. Recommend IP rehab at d/c.     Time Calculation:   PT Evaluation Complexity  History, PT Evaluation Complexity: 3 or more personal factors and/or comorbidities  Examination of Body Systems (PT Eval Complexity): total of 4 or more elements  Clinical Presentation (PT Evaluation Complexity): evolving  Clinical Decision Making (PT Evaluation Complexity): moderate complexity  Overall Complexity (PT Evaluation Complexity): moderate complexity     PT Charges       Row Name 10/27/24 0914             Time Calculation    Start Time 0846  -LS      PT Received On 10/27/24  -      PT Goal Re-Cert Due Date 11/06/24  -         Timed Charges    91154 - PT Therapeutic Activity Minutes 11  -LS         Untimed Charges    PT Eval/Re-eval Minutes 36  -LS         Total Minutes    Timed Charges Total Minutes 11  -LS      Untimed Charges Total Minutes 36  -LS       Total Minutes 47  -LS                User Key  (r) = Recorded  By, (t) = Taken By, (c) = Cosigned By      Initials Name Provider Type     Joseline Norris, PT Physical Therapist                  Therapy Charges for Today       Code Description Service Date Service Provider Modifiers Qty    [id number]  PT EVAL MOD COMPLEXITY 3 10/27/2024 Joseline Norris, PT GP 1    [id number]  PT THERAPEUTIC ACT EA 15 MIN 10/27/2024 Joseline Norris, PT GP 1            PT G-Codes  Outcome Measure Options: AM-PAC 6 Clicks Daily Activity (OT)  AM-PAC 6 Clicks Score (PT): 16  AM-PAC 6 Clicks Score (OT): 16  PT Discharge Summary  Anticipated Discharge Disposition (PT): inpatient rehabilitation facility    Joseline Norris, PT  10/27/2024

## 2024-10-28 LAB
ANION GAP SERPL CALCULATED.3IONS-SCNC: 11 MMOL/L (ref 5–15)
BUN SERPL-MCNC: 17 MG/DL (ref 8–23)
BUN/CREAT SERPL: 20.7 (ref 7–25)
CALCIUM SPEC-SCNC: 9.7 MG/DL (ref 8.6–10.5)
CHLORIDE SERPL-SCNC: 102 MMOL/L (ref 98–107)
CO2 SERPL-SCNC: 27 MMOL/L (ref 22–29)
CREAT SERPL-MCNC: 0.82 MG/DL (ref 0.57–1)
EGFRCR SERPLBLD CKD-EPI 2021: 68.9 ML/MIN/1.73
GLUCOSE SERPL-MCNC: 126 MG/DL (ref 65–99)
POTASSIUM SERPL-SCNC: 4 MMOL/L (ref 3.5–5.2)
SODIUM SERPL-SCNC: 140 MMOL/L (ref 136–145)

## 2024-10-28 PROCEDURE — 25010000002 THIAMINE HCL 200 MG/2ML SOLUTION: Performed by: INTERNAL MEDICINE

## 2024-10-28 PROCEDURE — 80048 BASIC METABOLIC PNL TOTAL CA: CPT | Performed by: INTERNAL MEDICINE

## 2024-10-28 PROCEDURE — 99232 SBSQ HOSP IP/OBS MODERATE 35: CPT | Performed by: INTERNAL MEDICINE

## 2024-10-28 PROCEDURE — 25010000002 MIDAZOLAM PER 1 MG: Performed by: INTERNAL MEDICINE

## 2024-10-28 PROCEDURE — 25010000002 HEPARIN (PORCINE) PER 1000 UNITS: Performed by: INTERNAL MEDICINE

## 2024-10-28 RX ORDER — LORAZEPAM 1 MG/1
1 TABLET ORAL DAILY
Status: DISCONTINUED | OUTPATIENT
Start: 2024-10-31 | End: 2024-10-30

## 2024-10-28 RX ORDER — LORAZEPAM 1 MG/1
1 TABLET ORAL EVERY 6 HOURS
Status: DISCONTINUED | OUTPATIENT
Start: 2024-10-29 | End: 2024-10-30

## 2024-10-28 RX ORDER — LORAZEPAM 1 MG/1
1 TABLET ORAL EVERY 12 HOURS SCHEDULED
Status: DISCONTINUED | OUTPATIENT
Start: 2024-10-30 | End: 2024-10-30

## 2024-10-28 RX ORDER — MIDAZOLAM HYDROCHLORIDE 1 MG/ML
2 INJECTION, SOLUTION INTRAMUSCULAR; INTRAVENOUS
Status: DISCONTINUED | OUTPATIENT
Start: 2024-10-28 | End: 2024-10-31

## 2024-10-28 RX ORDER — THIAMINE HYDROCHLORIDE 100 MG/ML
200 INJECTION, SOLUTION INTRAMUSCULAR; INTRAVENOUS EVERY 8 HOURS SCHEDULED
Status: DISCONTINUED | OUTPATIENT
Start: 2024-10-28 | End: 2024-10-31

## 2024-10-28 RX ORDER — LORAZEPAM 1 MG/1
2 TABLET ORAL EVERY 6 HOURS
Status: DISCONTINUED | OUTPATIENT
Start: 2024-10-28 | End: 2024-10-29

## 2024-10-28 RX ORDER — FOLIC ACID 1 MG/1
1 TABLET ORAL DAILY
Status: DISCONTINUED | OUTPATIENT
Start: 2024-10-28 | End: 2024-11-04 | Stop reason: HOSPADM

## 2024-10-28 RX ORDER — MIDAZOLAM HYDROCHLORIDE 1 MG/ML
4 INJECTION, SOLUTION INTRAMUSCULAR; INTRAVENOUS
Status: DISCONTINUED | OUTPATIENT
Start: 2024-10-28 | End: 2024-10-31

## 2024-10-28 RX ORDER — LORAZEPAM 1 MG/1
2 TABLET ORAL
Status: DISCONTINUED | OUTPATIENT
Start: 2024-10-28 | End: 2024-10-31

## 2024-10-28 RX ORDER — LORAZEPAM 1 MG/1
1 TABLET ORAL
Status: DISCONTINUED | OUTPATIENT
Start: 2024-10-28 | End: 2024-10-31

## 2024-10-28 RX ADMIN — HEPARIN SODIUM 5000 UNITS: 5000 INJECTION INTRAVENOUS; SUBCUTANEOUS at 14:00

## 2024-10-28 RX ADMIN — LORAZEPAM 2 MG: 1 TABLET ORAL at 10:43

## 2024-10-28 RX ADMIN — PREGABALIN 50 MG: 25 CAPSULE ORAL at 15:58

## 2024-10-28 RX ADMIN — PREGABALIN 50 MG: 25 CAPSULE ORAL at 20:07

## 2024-10-28 RX ADMIN — SENNOSIDES AND DOCUSATE SODIUM 2 TABLET: 50; 8.6 TABLET ORAL at 20:07

## 2024-10-28 RX ADMIN — FOLIC ACID 1 MG: 1 TABLET ORAL at 10:43

## 2024-10-28 RX ADMIN — PREGABALIN 50 MG: 25 CAPSULE ORAL at 08:15

## 2024-10-28 RX ADMIN — HYDROCODONE BITARTRATE AND ACETAMINOPHEN 1 TABLET: 7.5; 325 TABLET ORAL at 08:14

## 2024-10-28 RX ADMIN — LORAZEPAM 1 MG: 1 TABLET ORAL at 14:54

## 2024-10-28 RX ADMIN — MIDAZOLAM HYDROCHLORIDE 2 MG: 1 INJECTION, SOLUTION INTRAMUSCULAR; INTRAVENOUS at 20:08

## 2024-10-28 RX ADMIN — POLYETHYLENE GLYCOL 3350 17 G: 17 POWDER, FOR SOLUTION ORAL at 14:02

## 2024-10-28 RX ADMIN — ROSUVASTATIN 10 MG: 10 TABLET, FILM COATED ORAL at 08:14

## 2024-10-28 RX ADMIN — LORAZEPAM 2 MG: 1 TABLET ORAL at 16:00

## 2024-10-28 RX ADMIN — THIAMINE HYDROCHLORIDE 200 MG: 100 INJECTION, SOLUTION INTRAMUSCULAR; INTRAVENOUS at 20:07

## 2024-10-28 RX ADMIN — HEPARIN SODIUM 5000 UNITS: 5000 INJECTION INTRAVENOUS; SUBCUTANEOUS at 20:07

## 2024-10-28 RX ADMIN — HYDROCODONE BITARTRATE AND ACETAMINOPHEN 1 TABLET: 7.5; 325 TABLET ORAL at 20:08

## 2024-10-28 RX ADMIN — LOSARTAN POTASSIUM 25 MG: 25 TABLET, FILM COATED ORAL at 08:14

## 2024-10-28 RX ADMIN — LORAZEPAM 2 MG: 1 TABLET ORAL at 20:07

## 2024-10-28 RX ADMIN — HYDROXYZINE HYDROCHLORIDE 25 MG: 25 TABLET ORAL at 02:17

## 2024-10-28 RX ADMIN — ASPIRIN 81 MG 81 MG: 81 TABLET ORAL at 08:15

## 2024-10-28 RX ADMIN — Medication 5 MG: at 20:07

## 2024-10-28 RX ADMIN — Medication 10 ML: at 08:15

## 2024-10-28 RX ADMIN — SENNOSIDES AND DOCUSATE SODIUM 2 TABLET: 50; 8.6 TABLET ORAL at 08:14

## 2024-10-28 RX ADMIN — DONEPEZIL HYDROCHLORIDE 10 MG: 10 TABLET, FILM COATED ORAL at 08:15

## 2024-10-28 RX ADMIN — Medication 10 ML: at 20:08

## 2024-10-28 RX ADMIN — THIAMINE HYDROCHLORIDE 200 MG: 100 INJECTION, SOLUTION INTRAMUSCULAR; INTRAVENOUS at 15:07

## 2024-10-28 RX ADMIN — HEPARIN SODIUM 5000 UNITS: 5000 INJECTION INTRAVENOUS; SUBCUTANEOUS at 05:56

## 2024-10-28 NOTE — CASE MANAGEMENT/SOCIAL WORK
Continued Stay Note  [facility]     Patient Name: Monique Betts  MRN: [id number]  Today's Date: 10/28/2024    Admit Date: 10/25/2024    Plan: SNF   Discharge Plan       Row Name 10/28/24 1438       Plan    Plan SNF    Plan Comments Per discussion in MDR, CIWA checks were added. Awaiting clearance for MRI lumbar spine. I spoke with Pt, in room, who reports visual and auditory hallucinations. She walked 8ft with min assist and walker yesterday. She is on 1L NC (does not use home O2). I spoke with son who plans to review patient choice list and call me with SNF selections. CM will continue to follow.    Final Discharge Disposition Code 03 - skilled nursing facility (SNF)                   Discharge Codes    No documentation.                 Expected Discharge Date and Time       Expected Discharge Date Expected Discharge Time    Oct 29, 2024               Barbara Molina RN

## 2024-10-28 NOTE — PLAN OF CARE
Problem: Adult Inpatient Plan of Care  Goal: Plan of Care Review  Outcome: Progressing  Goal: Patient-Specific Goal (Individualized)  Outcome: Progressing  Goal: Absence of Hospital-Acquired Illness or Injury  Outcome: Progressing  Intervention: Identify and Manage Fall Risk  Recent Flowsheet Documentation  Taken 10/28/2024 0200 by García London RN  Safety Promotion/Fall Prevention:   activity supervised   assistive device/personal items within reach   safety round/check completed   clutter free environment maintained   nonskid shoes/slippers when out of bed  Taken 10/28/2024 0000 by García London RN  Safety Promotion/Fall Prevention:   safety round/check completed   activity supervised   assistive device/personal items within reach   clutter free environment maintained   fall prevention program maintained  Taken 10/27/2024 2200 by García London RN  Safety Promotion/Fall Prevention:   activity supervised   assistive device/personal items within reach   safety round/check completed   clutter free environment maintained   nonskid shoes/slippers when out of bed  Taken 10/27/2024 2000 by García London RN  Safety Promotion/Fall Prevention:   safety round/check completed   activity supervised   assistive device/personal items within reach   clutter free environment maintained   fall prevention program maintained  Intervention: Prevent Skin Injury  Recent Flowsheet Documentation  Taken 10/28/2024 0200 by García London RN  Body Position: position changed independently  Skin Protection:   incontinence pads utilized   transparent dressing maintained  Taken 10/28/2024 0000 by García London RN  Body Position: position changed independently  Skin Protection:   incontinence pads utilized   silicone foam dressing in place   transparent dressing maintained  Taken 10/27/2024 2200 by García London RN  Body Position: position changed independently  Skin Protection:   incontinence pads  utilized   transparent dressing maintained  Taken 10/27/2024 2000 by García London RN  Body Position: position changed independently  Skin Protection:   incontinence pads utilized   transparent dressing maintained  Intervention: Prevent and Manage VTE (Venous Thromboembolism) Risk  Recent Flowsheet Documentation  Taken 10/27/2024 2000 by García London RN  VTE Prevention/Management: (see MAR) other (see comments)  Intervention: Prevent Infection  Recent Flowsheet Documentation  Taken 10/28/2024 0200 by García London RN  Infection Prevention:   environmental surveillance performed   rest/sleep promoted  Taken 10/28/2024 0000 by García London RN  Infection Prevention:   hand hygiene promoted   rest/sleep promoted  Taken 10/27/2024 2200 by García London RN  Infection Prevention:   environmental surveillance performed   rest/sleep promoted  Taken 10/27/2024 2000 by García London RN  Infection Prevention:   cohorting utilized   hand hygiene promoted   rest/sleep promoted  Goal: Optimal Comfort and Wellbeing  Outcome: Progressing  Intervention: Provide Person-Centered Care  Recent Flowsheet Documentation  Taken 10/27/2024 2000 by García London RN  Trust Relationship/Rapport:   care explained   emotional support provided   questions answered   questions encouraged   thoughts/feelings acknowledged  Goal: Readiness for Transition of Care  Outcome: Progressing     Problem: Skin Injury Risk Increased  Goal: Skin Health and Integrity  Outcome: Progressing  Intervention: Optimize Skin Protection  Recent Flowsheet Documentation  Taken 10/28/2024 0200 by García London RN  Activity Management: activity minimized  Pressure Reduction Techniques:   frequent weight shift encouraged   pressure points protected  Head of Bed (HOB) Positioning: HOB elevated  Pressure Reduction Devices:   positioning supports utilized   pressure-redistributing mattress utilized  Skin Protection:   incontinence  pads utilized   transparent dressing maintained  Taken 10/28/2024 0000 by García London RN  Activity Management: activity minimized  Pressure Reduction Techniques:   frequent weight shift encouraged   heels elevated off bed   weight shift assistance provided   positioned off wounds  Head of Bed (HOB) Positioning: HOB elevated  Pressure Reduction Devices:   positioning supports utilized   pressure-redistributing mattress utilized  Skin Protection:   incontinence pads utilized   silicone foam dressing in place   transparent dressing maintained  Taken 10/27/2024 2200 by García London RN  Activity Management: activity minimized  Pressure Reduction Techniques:   frequent weight shift encouraged   pressure points protected  Head of Bed (HOB) Positioning: HOB elevated  Pressure Reduction Devices:   positioning supports utilized   pressure-redistributing mattress utilized  Skin Protection:   incontinence pads utilized   transparent dressing maintained  Taken 10/27/2024 2000 by García London RN  Activity Management: activity encouraged  Pressure Reduction Techniques:   frequent weight shift encouraged   heels elevated off bed   weight shift assistance provided   pressure points protected  Head of Bed (HOB) Positioning: HOB elevated  Pressure Reduction Devices:   pressure-redistributing mattress utilized   positioning supports utilized  Skin Protection:   incontinence pads utilized   transparent dressing maintained     Problem: Fall Injury Risk  Goal: Absence of Fall and Fall-Related Injury  Outcome: Progressing  Intervention: Identify and Manage Contributors  Recent Flowsheet Documentation  Taken 10/28/2024 0200 by García London RN  Self-Care Promotion: independence encouraged  Taken 10/27/2024 2200 by García London RN  Self-Care Promotion: independence encouraged  Taken 10/27/2024 2000 by García London RN  Medication Review/Management: medications reviewed  Intervention: Promote  Injury-Free Environment  Recent Flowsheet Documentation  Taken 10/28/2024 0200 by García London, RN  Safety Promotion/Fall Prevention:   activity supervised   assistive device/personal items within reach   safety round/check completed   clutter free environment maintained   nonskid shoes/slippers when out of bed  Taken 10/28/2024 0000 by García London, RN  Safety Promotion/Fall Prevention:   safety round/check completed   activity supervised   assistive device/personal items within reach   clutter free environment maintained   fall prevention program maintained  Taken 10/27/2024 2200 by García London, RN  Safety Promotion/Fall Prevention:   activity supervised   assistive device/personal items within reach   safety round/check completed   clutter free environment maintained   nonskid shoes/slippers when out of bed  Taken 10/27/2024 2000 by García London, RN  Safety Promotion/Fall Prevention:   safety round/check completed   activity supervised   assistive device/personal items within reach   clutter free environment maintained   fall prevention program maintained   Goal Outcome Evaluation:         Pt has no new nursing issues at this time. Pt is a&ox4, Sinus Bradycardia, VSS, 1L NC. PRN meds given per patient request for sleep and pain. Pt still hallucinating, restless and confused during the night. Pt has no complaints at this time. Will continue plan of care.

## 2024-10-28 NOTE — PROGRESS NOTES
[facility] Medicine Services  PROGRESS NOTE    Patient Name: Monique Betts  : 1935  MRN: [id number]    Date of Admission: 10/25/2024  Primary Care Physician: Sena Lomeli MD    Subjective   Subjective     CC: Follow-up weakness    HPI: Patient here get much sleep, does endorse some anxiousness and restlessness    Objective   Objective     Vital Signs:   Temp:  [98.3 °F (36.8 °C)-98.7 °F (37.1 °C)] 98.3 °F (36.8 °C)  Heart Rate:  [57-61] 61  Resp:  [18] 18  BP: (167-194)/(66-72) 170/72  Flow (L/min) (Oxygen Therapy):  [1-2] 1     Physical Exam:  Constitutional: Chronically ill-appearing elderly female anxious, restless  HENT: NCAT, mucous membranes moist  Respiratory: Clear to auscultation bilaterally, respiratory effort normal   Cardiovascular: RRR, no murmurs, rubs, or gallops  Gastrointestinal: Positive bowel sounds, soft, nontender, nondistended  Musculoskeletal: No bilateral ankle edema  Psychiatric: Appropriate affect, cooperative  Neurologic: Nonfocal    Results Reviewed:  LAB RESULTS:      Lab 10/26/24  0524 10/25/24  1808   WBC 4.94 6.39   HEMOGLOBIN 12.4 13.3   HEMATOCRIT 37.3 41.1   PLATELETS 177 203   NEUTROS ABS 1.69* 2.94   IMMATURE GRANS (ABS) 0.01 0.01   LYMPHS ABS 2.54 2.64   MONOS ABS 0.55 0.65   EOS ABS 0.12 0.09   MCV 97.4* 97.4*   PROCALCITONIN  --  0.06   LACTATE  --  1.1         Lab 10/26/24  0524 10/25/24  1808   SODIUM 141 142   POTASSIUM 4.3 4.1   CHLORIDE 105 105   CO2 28.0 28.0   ANION GAP 8.0 9.0   BUN 22 22   CREATININE 1.05* 1.00   EGFR 51.2* 54.3*   GLUCOSE 124* 169*   CALCIUM 8.8 9.6         Lab 10/25/24  1808   TOTAL PROTEIN 6.8   ALBUMIN 4.1   GLOBULIN 2.7   ALT (SGPT) 15   AST (SGOT) 21   BILIRUBIN 0.2   ALK PHOS 66                     Brief Urine Lab Results  (Last result in the past 365 days)        Color   Clarity   Blood   Leuk Est   Nitrite   Protein   CREAT   Urine HCG        10/25/24 1645 Yellow   Clear   Trace   Moderate (2+)    Negative   Trace                   Microbiology Results Abnormal       Procedure Component Value - Date/Time    Blood Culture - Blood, Arm, Left [[id number]]  (Normal) Collected: 10/25/24 1758    Lab Status: Preliminary result Specimen: Blood from Arm, Left Updated: 10/27/24 1831     Blood Culture No growth at 2 days    Narrative:      Less than seven (7) mL's of blood was collected.  Insufficient quantity may yield false negative results.    Blood Culture - Blood, Arm, Right [[id number]]  (Normal) Collected: 10/25/24 1808    Lab Status: Preliminary result Specimen: Blood from Arm, Right Updated: 10/27/24 1831     Blood Culture No growth at 2 days    Narrative:      Less than seven (7) mL's of blood was collected.  Insufficient quantity may yield false negative results.    Urine Culture - Urine, Urine, Clean Catch [[id number]] Collected: 10/25/24 1645    Lab Status: Final result Specimen: Urine, Clean Catch Updated: 10/26/24 1426     Urine Culture >100,000 CFU/mL Mixed Nica Isolated    Narrative:      Specimen contains mixed organisms of questionable pathogenicity suggestive of contamination. If symptoms persist, suggest recollection.  Colonization of the urinary tract without infection is common. Treatment is discouraged unless the patient is symptomatic, pregnant, or undergoing an invasive urologic procedure.            Adult Transthoracic Echo Complete W/ Cont if Necessary Per Protocol    Result Date: 10/27/2024    Left ventricular ejection fraction appears to be 56 - 60%.   Estimated right ventricular systolic pressure from tricuspid regurgitation is normal (<35 mmHg).   No significant valvular disease     XR Shoulder 2+ View Left    Result Date: 10/26/2024  XR SHOULDER 2+ VW LEFT Date of Exam: 10/26/2024 9:33 AM EDT Indication: limited ROM left shoulder Comparison: None available. Findings: Left shoulder joint appears anatomically aligned and bony structures appear to be intact. There is relatively mild  glenohumeral and AC joint DJD, for the patient's age. No fracture, avulsion, destructive or blastic bony lesion is seen. Included bones of the thorax appear clear. No significant left lung disease is seen.     Impression: Impression: Mild left shoulder joint DJD, not unusual for age. No evidence of acute or healing left shoulder trauma. Electronically Signed: Mil Ochoa MD  10/26/2024 1:31 PM EDT  Workstation ID: [id number]     Results for orders placed during the hospital encounter of 10/25/24    Adult Transthoracic Echo Complete W/ Cont if Necessary Per Protocol    Interpretation Summary    Left ventricular ejection fraction appears to be 56 - 60%.    Estimated right ventricular systolic pressure from tricuspid regurgitation is normal (<35 mmHg).    No significant valvular disease      Current medications:  Scheduled Meds:aspirin, 81 mg, Oral, Daily  cefTRIAXone, 1,000 mg, Intravenous, Q24H  donepezil, 10 mg, Oral, Daily  heparin (porcine), 5,000 Units, Subcutaneous, Q8H  losartan, 25 mg, Oral, Q24H  pregabalin, 50 mg, Oral, TID  rosuvastatin, 10 mg, Oral, Daily  senna-docusate sodium, 2 tablet, Oral, BID  sodium chloride, 10 mL, Intravenous, Q12H      Continuous Infusions:   PRN Meds:.  acetaminophen **OR** acetaminophen **OR** acetaminophen    senna-docusate sodium **AND** polyethylene glycol **AND** bisacodyl **AND** bisacodyl    HYDROcodone-acetaminophen    HYDROcodone-acetaminophen    hydrOXYzine    melatonin    nitroglycerin    ondansetron    ondansetron ODT    [COMPLETED] Insert Peripheral IV **AND** sodium chloride    sodium chloride    sodium chloride    Assessment & Plan   Assessment & Plan     Active Hospital Problems    Diagnosis  POA    **Failure to thrive in adult [R62.7]  Yes    UTI (urinary tract infection) [N39.0]  Yes    Cognitive decline [R41.89]  Yes    GERD without esophagitis [K21.9]  Yes    Dyslipidemia, goal LDL below 70 [E78.5]  Yes    Essential hypertension [I10]  Yes    Coronary artery  disease involving native coronary artery of native heart without angina pectoris [I25.10]  Yes      Resolved Hospital Problems   No resolved problems to display.        Brief Hospital Course to date:  Monique Betts is a 89 y.o. female w CAD s/p PCI, cognitive decline, chronic back pain, chronic urinary incontinence s/p bladder stimulator who presented from PMR office 10/25 w inability to walk.  Limited note from Dr Chatman 10/26 - have HPI but not assessment to see exact reason/concern for transport.     Inability to walk  H/o chronic back pain, L5/S1 radiculopathy  -patient denies orthostasis, denies worsening back pain or saddle anesthesia   -ddx broad, challenging w limited history able to obtain: possible acute on chronic L-spine dz (though do not suspect cauda equina given exam + patient report currently, current 5/5 strength LE, CT spine also reassuring), L-shoulder injury contributing (see below), possible cardiac or pulm sx contributing to weakness, orthostasis  -MRI L-spine was ordered, however patient has underlying bladder stimulator, remote needed to adjust settings, suspect this will be lower yield. F/u w right SI jt infection per [facility] plan  -obtain orthostatic vital signs, f/u borderline bradycardia  -Repeat echo shows 56 to 60%, otherwise normal  -PT/OT consulted, recommend inpatient rehabilitation     Left shoulder pain  -Denies any trauma,  -Shoulder x-ray with mild shoulder joint DJD no evidence of any acute process     EtOH abuse  -Will start CIWA protocol  -Continue multivitamins, thiamine, folic acid     Heart murmur  -reviewed 2022 echo, may be from AV sclerosis  -Echo as above     Possible UTI -appears less likely dx clinically, urine cultures NGTD     Bradycardia   -Heart rate is currently stable, will continue to monitor  -hold donepezil if persistent/contributing to sx above     Chronic urinary incontinence s/p bladder stim - will need MRI pre-approval/screening     Cognitive impairment    -Continue donepezil. If bradycardia persists may need to hold  .  CAD s/p PCI - statin, aspirin     Dispo  Discussed with  patient's son Bill. He states that patient has chronic back pain and has been getting frequent injections, these however have seized to help, she has been using a walker and has subsequently developed left shoulder discomfort, no reported recent falls. Patient lives alone, she is still driving, is a daily etoh drinker ([place]) They anticipate she will need more help.? rehab     Expected Discharge Location and Transportation: Rehab  Expected Discharge   Expected Discharge Date: 10/29/2024; Expected Discharge Time:      VTE Prophylaxis:  Pharmacologic & mechanical VTE prophylaxis orders are present.       AM-PAC 6 Clicks Score (PT): 17 (10/27/24 2000)    CODE STATUS:   Code Status and Medical Interventions: No CPR (Do Not Attempt to Resuscitate); Limited Support; No intubation (DNI)   Ordered at: 10/25/24 0342     Medical Intervention Limits:    No intubation (DNI)     Code Status (Patient has no pulse and is not breathing):    No CPR (Do Not Attempt to Resuscitate)     Medical Interventions (Patient has pulse or is breathing):    Limited Support       Karyna Zamora MD  10/28/24

## 2024-10-29 PROCEDURE — 99232 SBSQ HOSP IP/OBS MODERATE 35: CPT | Performed by: INTERNAL MEDICINE

## 2024-10-29 PROCEDURE — 25010000002 THIAMINE HCL 200 MG/2ML SOLUTION: Performed by: INTERNAL MEDICINE

## 2024-10-29 PROCEDURE — 97530 THERAPEUTIC ACTIVITIES: CPT

## 2024-10-29 PROCEDURE — 25010000002 HEPARIN (PORCINE) PER 1000 UNITS: Performed by: INTERNAL MEDICINE

## 2024-10-29 RX ADMIN — DONEPEZIL HYDROCHLORIDE 10 MG: 10 TABLET, FILM COATED ORAL at 08:28

## 2024-10-29 RX ADMIN — LORAZEPAM 2 MG: 1 TABLET ORAL at 08:29

## 2024-10-29 RX ADMIN — PREGABALIN 50 MG: 25 CAPSULE ORAL at 15:46

## 2024-10-29 RX ADMIN — Medication 5 MG: at 20:10

## 2024-10-29 RX ADMIN — HEPARIN SODIUM 5000 UNITS: 5000 INJECTION INTRAVENOUS; SUBCUTANEOUS at 05:52

## 2024-10-29 RX ADMIN — LORAZEPAM 1 MG: 1 TABLET ORAL at 15:46

## 2024-10-29 RX ADMIN — Medication 10 ML: at 08:27

## 2024-10-29 RX ADMIN — ROSUVASTATIN 10 MG: 10 TABLET, FILM COATED ORAL at 08:28

## 2024-10-29 RX ADMIN — PREGABALIN 50 MG: 25 CAPSULE ORAL at 20:10

## 2024-10-29 RX ADMIN — LORAZEPAM 1 MG: 1 TABLET ORAL at 22:02

## 2024-10-29 RX ADMIN — FOLIC ACID 1 MG: 1 TABLET ORAL at 08:28

## 2024-10-29 RX ADMIN — THIAMINE HYDROCHLORIDE 200 MG: 100 INJECTION, SOLUTION INTRAMUSCULAR; INTRAVENOUS at 05:52

## 2024-10-29 RX ADMIN — THIAMINE HYDROCHLORIDE 200 MG: 100 INJECTION, SOLUTION INTRAMUSCULAR; INTRAVENOUS at 15:46

## 2024-10-29 RX ADMIN — PREGABALIN 50 MG: 25 CAPSULE ORAL at 08:28

## 2024-10-29 RX ADMIN — THIAMINE HYDROCHLORIDE 200 MG: 100 INJECTION, SOLUTION INTRAMUSCULAR; INTRAVENOUS at 22:00

## 2024-10-29 RX ADMIN — ASPIRIN 81 MG 81 MG: 81 TABLET ORAL at 08:28

## 2024-10-29 RX ADMIN — Medication 10 ML: at 20:11

## 2024-10-29 RX ADMIN — HEPARIN SODIUM 5000 UNITS: 5000 INJECTION INTRAVENOUS; SUBCUTANEOUS at 13:28

## 2024-10-29 RX ADMIN — SENNOSIDES AND DOCUSATE SODIUM 2 TABLET: 50; 8.6 TABLET ORAL at 08:28

## 2024-10-29 RX ADMIN — LOSARTAN POTASSIUM 25 MG: 25 TABLET, FILM COATED ORAL at 08:28

## 2024-10-29 RX ADMIN — SENNOSIDES AND DOCUSATE SODIUM 2 TABLET: 50; 8.6 TABLET ORAL at 20:10

## 2024-10-29 RX ADMIN — HEPARIN SODIUM 5000 UNITS: 5000 INJECTION INTRAVENOUS; SUBCUTANEOUS at 22:00

## 2024-10-29 NOTE — CASE MANAGEMENT/SOCIAL WORK
Continued Stay Note  [facility]     Patient Name: Monique Betts  MRN: [id number]  Today's Date: 10/29/2024    Admit Date: 10/25/2024    Plan: SNF   Discharge Plan       Row Name 10/29/24 0905       Plan    Plan SNF    Patient/Family in Agreement with Plan yes    Plan Comments CM called son, Vivek and left a voicemail to see if he has decided on SNF's rof referrals. CM will continue to follow.    Final Discharge Disposition Code 03 - skilled nursing facility (SNF)                   Discharge Codes    No documentation.                 Expected Discharge Date and Time       Expected Discharge Date Expected Discharge Time    Oct 29, 2024               Inderjit Zhao RN

## 2024-10-29 NOTE — CASE MANAGEMENT/SOCIAL WORK
Continued Stay Note  [facility]     Patient Name: Monique Betts  MRN: [id number]  Today's Date: 10/29/2024    Admit Date: 10/25/2024    Plan: Rehab   Discharge Plan       Row Name 10/29/24 1133       Plan    Plan Rehab    Patient/Family in Agreement with Plan yes    Plan Comments Spoke with Vivek bill by phone and Bill would like referrals made to [facility] at . CM spoke with April at [facility] and Jackelin at [facility] and made the referrals. CM will continue to follow.    Final Discharge Disposition Code 03 - skilled nursing facility (SNF)      Row Name 10/29/24 0905       Plan    Plan SNF    Patient/Family in Agreement with Plan yes    Plan Comments CM called Vivek bill and left a voicemail to see if he has decided on SNF's rof referrals. CM will continue to follow.    Final Discharge Disposition Code 03 - skilled nursing facility (SNF)                   Discharge Codes    No documentation.                 Expected Discharge Date and Time       Expected Discharge Date Expected Discharge Time    Oct 29, 2024               Inderjit Zhao RN

## 2024-10-29 NOTE — PROGRESS NOTES
[facility] Medicine Services  PROGRESS NOTE    Patient Name: Monique Betts  : 1935  MRN: [id number]    Date of Admission: 10/25/2024  Primary Care Physician: Sena Lomeli MD    Subjective   Subjective     CC: Follow-up weakness    HPI: No acute events overnight, patient sleepy/lethargic this morning, barely arouses    Objective   Objective     Vital Signs:   Temp:  [97.3 °F (36.3 °C)-98.6 °F (37 °C)] 97.3 °F (36.3 °C)  Heart Rate:  [51-72] 51  Resp:  [18] 18  BP: (116-135)/(55-68) 134/57  Flow (L/min) (Oxygen Therapy):  [1-2] 2     Physical Exam:  Constitutional: Elderly female, sleepy, comfortable  HENT: NCAT, mucous membranes dry  Respiratory: Nonlabored  Cardiovascular: Bradycardic, no murmurs, rubs, or gallops  Gastrointestinal: Positive bowel sounds, soft, nontender, nondistended  Musculoskeletal: No bilateral ankle edema  Psychiatric: DAX  Neurologic: DAX    Results Reviewed:  LAB RESULTS:      Lab 10/26/24  0524 10/25/24  1808   WBC 4.94 6.39   HEMOGLOBIN 12.4 13.3   HEMATOCRIT 37.3 41.1   PLATELETS 177 203   NEUTROS ABS 1.69* 2.94   IMMATURE GRANS (ABS) 0.01 0.01   LYMPHS ABS 2.54 2.64   MONOS ABS 0.55 0.65   EOS ABS 0.12 0.09   MCV 97.4* 97.4*   PROCALCITONIN  --  0.06   LACTATE  --  1.1         Lab 10/28/24  1319 10/26/24  0524 10/25/24  1808   SODIUM 140 141 142   POTASSIUM 4.0 4.3 4.1   CHLORIDE 102 105 105   CO2 27.0 28.0 28.0   ANION GAP 11.0 8.0 9.0   BUN 17 22 22   CREATININE 0.82 1.05* 1.00   EGFR 68.9 51.2* 54.3*   GLUCOSE 126* 124* 169*   CALCIUM 9.7 8.8 9.6         Lab 10/25/24  1808   TOTAL PROTEIN 6.8   ALBUMIN 4.1   GLOBULIN 2.7   ALT (SGPT) 15   AST (SGOT) 21   BILIRUBIN 0.2   ALK PHOS 66                     Brief Urine Lab Results  (Last result in the past 365 days)        Color   Clarity   Blood   Leuk Est   Nitrite   Protein   CREAT   Urine HCG        10/25/24 1645 Yellow   Clear   Trace   Moderate (2+)   Negative   Trace                    Microbiology Results Abnormal       None            Adult Transthoracic Echo Complete W/ Cont if Necessary Per Protocol    Result Date: 10/27/2024    Left ventricular ejection fraction appears to be 56 - 60%.   Estimated right ventricular systolic pressure from tricuspid regurgitation is normal (<35 mmHg).   No significant valvular disease      Results for orders placed during the hospital encounter of 10/25/24    Adult Transthoracic Echo Complete W/ Cont if Necessary Per Protocol    Interpretation Summary    Left ventricular ejection fraction appears to be 56 - 60%.    Estimated right ventricular systolic pressure from tricuspid regurgitation is normal (<35 mmHg).    No significant valvular disease      Current medications:  Scheduled Meds:aspirin, 81 mg, Oral, Daily  donepezil, 10 mg, Oral, Daily  folic acid, 1 mg, Oral, Daily  heparin (porcine), 5,000 Units, Subcutaneous, Q8H  LORazepam, 2 mg, Oral, Q6H   Followed by  LORazepam, 1 mg, Oral, Q6H   Followed by  [START ON 10/30/2024] LORazepam, 1 mg, Oral, Q12H   Followed by  [START ON 10/31/2024] LORazepam, 1 mg, Oral, Daily  losartan, 25 mg, Oral, Q24H  pregabalin, 50 mg, Oral, TID  rosuvastatin, 10 mg, Oral, Daily  senna-docusate sodium, 2 tablet, Oral, BID  sodium chloride, 10 mL, Intravenous, Q12H  thiamine (B-1) IV, 200 mg, Intravenous, Q8H   Followed by  [START ON 11/2/2024] thiamine, 100 mg, Oral, Daily      Continuous Infusions:   PRN Meds:.  acetaminophen **OR** acetaminophen **OR** acetaminophen    senna-docusate sodium **AND** polyethylene glycol **AND** bisacodyl **AND** bisacodyl    HYDROcodone-acetaminophen    HYDROcodone-acetaminophen    hydrOXYzine    LORazepam **OR** midazolam **OR** LORazepam **OR** midazolam **OR** midazolam **OR** midazolam    Magnesium Standard Dose Replacement - Follow Nurse / BPA Driven Protocol    melatonin    nitroglycerin    ondansetron    ondansetron ODT    [COMPLETED] Insert Peripheral IV **AND** sodium chloride     sodium chloride    sodium chloride    Assessment & Plan   Assessment & Plan     Active Hospital Problems    Diagnosis  POA    **Failure to thrive in adult [R62.7]  Yes    UTI (urinary tract infection) [N39.0]  Yes    Cognitive decline [R41.89]  Yes    GERD without esophagitis [K21.9]  Yes    Dyslipidemia, goal LDL below 70 [E78.5]  Yes    Essential hypertension [I10]  Yes    Coronary artery disease involving native coronary artery of native heart without angina pectoris [I25.10]  Yes      Resolved Hospital Problems   No resolved problems to display.        Brief Hospital Course to date:  Monique Betts is a 89 y.o. female w CAD s/p PCI, cognitive decline, chronic back pain, chronic urinary incontinence s/p bladder stimulator who presented from PMR office 10/25 w inability to walk.  Limited note from Dr Chatman 10/26 - have HPI but not assessment to see exact reason/concern for transport.     Inability to walk  H/o chronic back pain, L5/S1 radiculopathy  -patient denies orthostasis, denies worsening back pain or saddle anesthesia   -ddx broad, challenging w limited history able to obtain: possible acute on chronic L-spine dz (though do not suspect cauda equina given exam + patient report currently, current 5/5 strength LE, CT spine also reassuring), L-shoulder injury contributing (see below), possible cardiac or pulm sx contributing to weakness, orthostasis  -MRI L-spine was ordered, however patient has underlying bladder stimulator, remote needed to adjust settings, suspect this will be lower yield. F/u w right SI jt infection per [facility] plan  -obtain orthostatic vital signs, f/u borderline bradycardia  -Repeat echo shows 56 to 60%, otherwise normal  -PT/OT consulted, recommend inpatient rehabilitation     Left shoulder pain  -Denies any trauma,  -Shoulder x-ray with mild shoulder joint DJD no evidence of any acute process      EtOH abuse with suspected withdrawal  -Patient having hallucinations, tremulous  -Continue  [facility] protocol  -Continue multivitamins, thiamine, folic acid     Heart murmur  -reviewed 2022 echo, may be from AV sclerosis  -Echo as above     Possible UTI -appears less likely dx clinically, urine cultures NGTD     Bradycardia   -Heart rate is currently stable, will continue to monitor  -hold donepezil if persistent/contributing to sx above     Chronic urinary incontinence s/p bladder stim   - will need MRI pre-approval/screening     Cognitive impairment   -Continue donepezil. If bradycardia persists may need to hold  .  CAD s/p PCI - statin, aspirin     Expected Discharge Location and Transportation: Rehab  Expected Discharge   Expected Discharge Date: 10/29/2024; Expected Discharge Time:      VTE Prophylaxis:  Pharmacologic & mechanical VTE prophylaxis orders are present.         AM-PAC 6 Clicks Score (PT): 16 (10/28/24 2000)    CODE STATUS:   Code Status and Medical Interventions: No CPR (Do Not Attempt to Resuscitate); Limited Support; No intubation (DNI)   Ordered at: 10/25/24 0584     Medical Intervention Limits:    No intubation (DNI)     Code Status (Patient has no pulse and is not breathing):    No CPR (Do Not Attempt to Resuscitate)     Medical Interventions (Patient has pulse or is breathing):    Limited Support       Karyna Zamora MD  10/29/24

## 2024-10-29 NOTE — THERAPY TREATMENT NOTE
Patient Name: Monique Betts  : 1935    MRN: [id number]                              Today's Date: 10/29/2024       Admit Date: 10/25/2024    Visit Dx:     ICD-10-CM ICD-9-CM   1. Acute on chronic low back pain  M54.50 724.2    G89.29 338.19     338.29   2. Acute UTI  N39.0 599.0     Patient Active Problem List   Diagnosis    NSTEMI (non-ST elevated myocardial infarction)    COVID-19 virus infection    Coronary artery disease involving native coronary artery of native heart without angina pectoris    Dyslipidemia, goal LDL below 70    Essential hypertension    UTI (urinary tract infection)    Failure to thrive in adult    Cognitive decline    GERD without esophagitis     Past Medical History:   Diagnosis Date    Cancer     Heart murmur     Skin Cancer    Sleep apnea     Not using machine for several years.     Past Surgical History:   Procedure Laterality Date    CARDIAC CATHETERIZATION N/A 2022    Procedure: LEFT HEART CATH;  Surgeon: Antonino Koo MD;  Location: [facility] CATH INVASIVE LOCATION;  Service: Cardiovascular;  Laterality: N/A;      General Information       Row Name 10/29/24 1240          OT Time and Intention    Document Type therapy note (daily note)  -JR     Mode of Treatment occupational therapy  -       Row Name 10/29/24 1240          General Information    Patient Profile Reviewed yes  -JR     Existing Precautions/Restrictions fall;oxygen therapy device and L/min;other (see comments)  L UE/shoulder pain with movement  -JR     Barriers to Rehab medically complex  -       Row Name 10/29/24 1240          Cognition    Orientation Status (Cognition) unable/difficult to assess;other (see comments)  Pt lethargic this date. Opened eyes to voice and followed a few commands, unable to maintain alert level this date  -       Row Name 10/29/24 1240          Safety Issues/Impairments Affecting Functional Mobility    Safety Issues Affecting Function (Mobility) ability to follow  commands;awareness of need for assistance;insight into deficits/self-awareness;sequencing abilities;safety precautions follow-through/compliance;problem-solving;safety precaution awareness  -JR     Impairments Affecting Function (Mobility) balance;cognition;endurance/activity tolerance;strength;pain;postural/trunk control;shortness of breath;motor control  -JR     Cognitive Impairments, Mobility Safety/Performance attention;awareness, need for assistance;insight into deficits/self-awareness;problem-solving/reasoning;safety precaution awareness;safety precaution follow-through;sequencing abilities  -JR               User Key  (r) = Recorded By, (t) = Taken By, (c) = Cosigned By      Initials Name Provider Type    Jenni Guerra, OT Occupational Therapist                     Mobility/ADL's       Row Name 10/29/24 1243          Bed Mobility    Comment, (Bed Mobility) Pt lethargic this date, deferred EOB/OOB as pt unable to maintain alert level this date.  -       Row Name 10/29/24 1243          Activities of Daily Living    BADL Assessment/Intervention grooming  -       Row Name 10/29/24 1243          Grooming Assessment/Training    [place] Level (Grooming) wash face, hands;set up;hair care, combing/brushing;maximum assist (25% patient effort);verbal cues  -JR     Position (Grooming) supine  -JR     Comment, (Grooming) Limited thoroughness with hair care this date as well as pt required cues to use correct side of brush. Pt with difficulty maintaining alert level this date.  -JR               User Key  (r) = Recorded By, (t) = Taken By, (c) = Cosigned By      Initials Name Provider Type    Jenni Guerra, OT Occupational Therapist                   Obj/Interventions       Row Name 10/29/24 1244          Shoulder (Therapeutic Exercise)    Shoulder (Therapeutic Exercise) AAROM (active assistive range of motion)  -     Shoulder AAROM (Therapeutic Exercise)  right;flexion;extension;aBduction;aDduction;10 repetitions;supine  therex began AAROM, progressed to PROM as pt unable to maintain alert level this date.  -       Row Name 10/29/24 1244          Elbow/Forearm (Therapeutic Exercise)    Elbow/Forearm (Therapeutic Exercise) PROM (passive range of motion)  -     Elbow/Forearm PROM (Therapeutic Exercise) bilateral;flexion;extension;supination;pronation;10 repetitions;supine  -       Row Name 10/29/24 1244          Motor Skills    Therapeutic Exercise shoulder;elbow/forearm  -               User Key  (r) = Recorded By, (t) = Taken By, (c) = Cosigned By      Initials Name Provider Type     Jenni Slater, OT Occupational Therapist                   Goals/Plan    No documentation.                  Clinical Impression       [facility] Name 10/29/24 1246          Pain Assessment    Additional Documentation Pain Scale: FACES Pre/Post-Treatment (Group)  -[facility] Name 10/29/24 1246          Pain Scale: FACES Pre/Post-Treatment    Pain: FACES Scale, Pretreatment 0-->no hurt  -     Posttreatment Pain Rating 0-->no hurt  -[facility] Name 10/29/24 1246          Plan of Care Review    Plan of Care Reviewed With patient  -     Progress declining  -     Outcome Evaluation Pt with limited ability to maintain alert level this date therefore limiting treatment session. Pt remains below baseline with ADL's and mobility due to decreased strength, balance, activity tolerance and decreased alert level. Recommend continued skilled OT services and transfer to IRF at d/c.  -       Row Name 10/29/24 1246          Therapy Plan Review/Discharge Plan (OT)    Anticipated Discharge Disposition (OT) inpatient rehabilitation facility  -[facility] Name 10/29/24 1246          Vital Signs    Pre Systolic BP Rehab 143  -     Pre Treatment Diastolic BP 62  -JR     Post Systolic BP Rehab 124  -JR     Post Treatment Diastolic BP 50  -JR     Pretreatment Heart Rate (beats/min) 48  -JR      Posttreatment Heart Rate (beats/min) 52  -JR     Pre SpO2 (%) 100  -JR     O2 Delivery Pre Treatment nasal cannula  -JR     Post SpO2 (%) 100  -JR     O2 Delivery Post Treatment nasal cannula  -JR     Pre Patient Position Supine  -JR     Intra Patient Position Supine  -JR     Post Patient Position Supine  -JR       Row Name 10/29/24 1246          Positioning and Restraints    Pre-Treatment Position in bed  -JR     Post Treatment Position bed  -JR     In Bed notified nsg;supine;call light within reach;encouraged to call for assist;exit alarm on  -JR               User Key  (r) = Recorded By, (t) = Taken By, (c) = Cosigned By      Initials Name Provider Type    Jenni Guerra OT Occupational Therapist                   Outcome Measures       Row Name 10/29/24 1248          How much help from another is currently needed...    Putting on and taking off regular lower body clothing? 2  -JR     Bathing (including washing, rinsing, and drying) 2  -JR     Toileting (which includes using toilet bed pan or urinal) 3  -JR     Putting on and taking off regular upper body clothing 2  -JR     Taking care of personal grooming (such as brushing teeth) 2  -JR     Eating meals 3  -JR     AM-PAC 6 Clicks Score (OT) 14  -JR       Row Name 10/29/24 1248          Functional Assessment    Outcome Measure Options AM-PAC 6 Clicks Daily Activity (OT)  -JR               User Key  (r) = Recorded By, (t) = Taken By, (c) = Cosigned By      Initials Name Provider Type    Jenni Guerra OT Occupational Therapist                    Occupational Therapy Education       Title: PT OT SLP Therapies (In Progress)       Topic: Occupational Therapy (In Progress)       Point: ADL training (Done)       Description:   Instruct learner(s) on proper safety adaptation and remediation techniques during self care or transfers.   Instruct in proper use of assistive devices.                  Learning Progress Summary            Patient Acceptance,  E,D, NR,VU by FAM at 10/26/2024 1413    Comment: reason for consult, noted deficits, benefit of activity, walker safety                      Point: Home exercise program (In Progress)       Description:   Instruct learner(s) on appropriate technique for monitoring, assisting and/or progressing therapeutic exercises/activities.                  Learning Progress Summary            Patient Acceptance, E, NL by  at 10/29/2024 1225    Comment: therex                      Point: Precautions (Done)       Description:   Instruct learner(s) on prescribed precautions during self-care and functional transfers.                  Learning Progress Summary            Patient Acceptance, E,D, NR,VU by FAM at 10/26/2024 1413    Comment: reason for consult, noted deficits, benefit of activity, walker safety                      Point: Body mechanics (Done)       Description:   Instruct learner(s) on proper positioning and spine alignment during self-care, functional mobility activities and/or exercises.                  Learning Progress Summary            Patient Acceptance, E,D, NR,VU by FAM at 10/26/2024 1413    Comment: reason for consult, noted deficits, benefit of activity, walker safety                                      User Key       Initials Effective Dates Name Provider Type Discipline    FAM 07/11/23 -  Kerry Mayo, OT Occupational Therapist OT     02/03/23 -  Jenni Slater OT Occupational Therapist OT                  OT Recommendation and Plan     Plan of Care Review  Plan of Care Reviewed With: patient  Progress: declining  Outcome Evaluation: Pt with limited ability to maintain alert level this date therefore limiting treatment session. Pt remains below baseline with ADL's and mobility due to decreased strength, balance, activity tolerance and decreased alert level. Recommend continued skilled OT services and transfer to IRF at d/c.     Time Calculation:         Time Calculation- OT       Row Name 10/29/24  1249             Time Calculation- OT    OT Start Time 1225  -JR      OT Received On 10/29/24  -JR         Timed Charges    30022 - OT Therapeutic Activity Minutes 12  -JR         Total Minutes    Timed Charges Total Minutes 12  -JR       Total Minutes 12  -JR                User Key  (r) = Recorded By, (t) = Taken By, (c) = Cosigned By      Initials Name Provider Type     Jenni Slater OT Occupational Therapist                  Therapy Charges for Today       Code Description Service Date Service Provider Modifiers Qty    [id number]  OT THERAPEUTIC ACT EA 15 MIN 10/29/2024 Jenni Slater OT GO 1                 Jenni Slater OT  10/29/2024

## 2024-10-29 NOTE — PLAN OF CARE
Goal Outcome Evaluation:  Plan of Care Reviewed With: patient        Progress: declining  Outcome Evaluation: Pt with limited ability to maintain alert level this date therefore limiting treatment session. Pt remains below baseline with ADL's and mobility due to decreased strength, balance, activity tolerance and decreased alert level. Recommend continued skilled OT services and transfer to IRF at d/c.    Anticipated Discharge Disposition (OT): inpatient rehabilitation facility

## 2024-10-29 NOTE — PLAN OF CARE
Goal Outcome Evaluation:   Pt more alert today and up to BSC w 1 asst.    Mild confusion but no hallucinations or combative behavior  noted.     VSS on o2 2 liters.  SB on tele.    PO intake improving .

## 2024-10-30 ENCOUNTER — APPOINTMENT (OUTPATIENT)
Dept: MRI IMAGING | Facility: HOSPITAL | Age: 89
DRG: 552 | End: 2024-10-30
Payer: MEDICARE

## 2024-10-30 LAB
BACTERIA SPEC AEROBE CULT: NORMAL
BACTERIA SPEC AEROBE CULT: NORMAL

## 2024-10-30 PROCEDURE — 25010000002 THIAMINE HCL 200 MG/2ML SOLUTION: Performed by: INTERNAL MEDICINE

## 2024-10-30 PROCEDURE — 99232 SBSQ HOSP IP/OBS MODERATE 35: CPT | Performed by: INTERNAL MEDICINE

## 2024-10-30 PROCEDURE — 25010000002 HEPARIN (PORCINE) PER 1000 UNITS: Performed by: INTERNAL MEDICINE

## 2024-10-30 PROCEDURE — 72148 MRI LUMBAR SPINE W/O DYE: CPT

## 2024-10-30 RX ORDER — LORAZEPAM 1 MG/1
1 TABLET ORAL DAILY
Status: COMPLETED | OUTPATIENT
Start: 2024-10-31 | End: 2024-10-31

## 2024-10-30 RX ORDER — LORAZEPAM 1 MG/1
1 TABLET ORAL EVERY 12 HOURS SCHEDULED
Status: COMPLETED | OUTPATIENT
Start: 2024-10-30 | End: 2024-10-30

## 2024-10-30 RX ADMIN — THIAMINE HYDROCHLORIDE 200 MG: 100 INJECTION, SOLUTION INTRAMUSCULAR; INTRAVENOUS at 15:47

## 2024-10-30 RX ADMIN — ROSUVASTATIN 10 MG: 10 TABLET, FILM COATED ORAL at 09:59

## 2024-10-30 RX ADMIN — LORAZEPAM 1 MG: 1 TABLET ORAL at 04:23

## 2024-10-30 RX ADMIN — Medication 10 ML: at 10:00

## 2024-10-30 RX ADMIN — SENNOSIDES AND DOCUSATE SODIUM 2 TABLET: 50; 8.6 TABLET ORAL at 10:00

## 2024-10-30 RX ADMIN — HEPARIN SODIUM 5000 UNITS: 5000 INJECTION INTRAVENOUS; SUBCUTANEOUS at 05:28

## 2024-10-30 RX ADMIN — PREGABALIN 50 MG: 25 CAPSULE ORAL at 20:11

## 2024-10-30 RX ADMIN — THIAMINE HYDROCHLORIDE 200 MG: 100 INJECTION, SOLUTION INTRAMUSCULAR; INTRAVENOUS at 05:28

## 2024-10-30 RX ADMIN — DONEPEZIL HYDROCHLORIDE 10 MG: 10 TABLET, FILM COATED ORAL at 10:00

## 2024-10-30 RX ADMIN — PREGABALIN 50 MG: 25 CAPSULE ORAL at 16:56

## 2024-10-30 RX ADMIN — SENNOSIDES AND DOCUSATE SODIUM 2 TABLET: 50; 8.6 TABLET ORAL at 20:11

## 2024-10-30 RX ADMIN — LOSARTAN POTASSIUM 25 MG: 25 TABLET, FILM COATED ORAL at 10:00

## 2024-10-30 RX ADMIN — HEPARIN SODIUM 5000 UNITS: 5000 INJECTION INTRAVENOUS; SUBCUTANEOUS at 15:47

## 2024-10-30 RX ADMIN — HEPARIN SODIUM 5000 UNITS: 5000 INJECTION INTRAVENOUS; SUBCUTANEOUS at 22:25

## 2024-10-30 RX ADMIN — LORAZEPAM 1 MG: 1 TABLET ORAL at 11:25

## 2024-10-30 RX ADMIN — LORAZEPAM 1 MG: 1 TABLET ORAL at 20:11

## 2024-10-30 RX ADMIN — LORAZEPAM 1 MG: 1 TABLET ORAL at 10:24

## 2024-10-30 RX ADMIN — FOLIC ACID 1 MG: 1 TABLET ORAL at 10:00

## 2024-10-30 RX ADMIN — ASPIRIN 81 MG 81 MG: 81 TABLET ORAL at 09:59

## 2024-10-30 RX ADMIN — PREGABALIN 50 MG: 25 CAPSULE ORAL at 09:59

## 2024-10-30 RX ADMIN — Medication 10 ML: at 20:15

## 2024-10-30 RX ADMIN — LORAZEPAM 1 MG: 1 TABLET ORAL at 23:56

## 2024-10-30 RX ADMIN — THIAMINE HYDROCHLORIDE 200 MG: 100 INJECTION, SOLUTION INTRAMUSCULAR; INTRAVENOUS at 22:25

## 2024-10-30 RX ADMIN — ACETAMINOPHEN 650 MG: 325 TABLET ORAL at 10:26

## 2024-10-30 NOTE — PLAN OF CARE
Goal Outcome Evaluation:              Outcome Evaluation: Patient lethargic throughout shift, forgetful at times. Max CIWA 10, prn lorazepam administered. Complaints of pain addressed with repositioning, PRN medications, pillow support. Seizure precautions maintained, side rails padded.

## 2024-10-30 NOTE — CASE MANAGEMENT/SOCIAL WORK
Continued Stay Note  [facility]     Patient Name: Monique Betts  MRN: [id number]  Today's Date: 10/30/2024    Admit Date: 10/25/2024    Plan: Rehab   Discharge Plan       Row Name 10/30/24 1313       Plan    Plan Rehab    Plan Comments Per discussion in [facility], CIWA was 5. She is still having hallucinations. MRI L-spine was performed. Pt is c/o LUE pain. She is on 2L NC (does not wear home O2). I spoke with Pt, in room, and son, Vivek, via telephone, regarding the referrals made yesterday. Due to current hallucinations, Pt is not appropriate for [facility]. I explained the [facility], too, is unlikely to accept with active hallucinations. I asked if there were other facilities he would like referrals sent to. Son stated he would review patient choice list and call me back. CM will continue to follow.    Final Discharge Disposition Code 03 - skilled nursing facility (SNF)                   Discharge Codes    No documentation.                 Expected Discharge Date and Time       Expected Discharge Date Expected Discharge Time    Nov 1, 2024               Barbara Molina RN

## 2024-10-31 LAB
ANION GAP SERPL CALCULATED.3IONS-SCNC: 11 MMOL/L (ref 5–15)
BUN SERPL-MCNC: 22 MG/DL (ref 8–23)
BUN/CREAT SERPL: 23.7 (ref 7–25)
CALCIUM SPEC-SCNC: 9.2 MG/DL (ref 8.6–10.5)
CHLORIDE SERPL-SCNC: 105 MMOL/L (ref 98–107)
CO2 SERPL-SCNC: 25 MMOL/L (ref 22–29)
CREAT SERPL-MCNC: 0.93 MG/DL (ref 0.57–1)
DEPRECATED RDW RBC AUTO: 43 FL (ref 37–54)
EGFRCR SERPLBLD CKD-EPI 2021: 59.2 ML/MIN/1.73
ERYTHROCYTE [DISTWIDTH] IN BLOOD BY AUTOMATED COUNT: 12 % (ref 12.3–15.4)
FOLATE SERPL-MCNC: >20 NG/ML (ref 4.78–24.2)
GLUCOSE SERPL-MCNC: 112 MG/DL (ref 65–99)
HCT VFR BLD AUTO: 38 % (ref 34–46.6)
HGB BLD-MCNC: 12.7 G/DL (ref 12–15.9)
HIV 1+2 AB+HIV1 P24 AG SERPL QL IA: NORMAL
MCH RBC QN AUTO: 32.2 PG (ref 26.6–33)
MCHC RBC AUTO-ENTMCNC: 33.4 G/DL (ref 31.5–35.7)
MCV RBC AUTO: 96.4 FL (ref 79–97)
PLATELET # BLD AUTO: 149 10*3/MM3 (ref 140–450)
PMV BLD AUTO: 13.2 FL (ref 6–12)
POTASSIUM SERPL-SCNC: 3.9 MMOL/L (ref 3.5–5.2)
RBC # BLD AUTO: 3.94 10*6/MM3 (ref 3.77–5.28)
SODIUM SERPL-SCNC: 141 MMOL/L (ref 136–145)
TSH SERPL DL<=0.05 MIU/L-ACNC: 2.88 UIU/ML (ref 0.27–4.2)
VIT B12 BLD-MCNC: 864 PG/ML (ref 211–946)
WBC NRBC COR # BLD AUTO: 6.34 10*3/MM3 (ref 3.4–10.8)

## 2024-10-31 PROCEDURE — 99232 SBSQ HOSP IP/OBS MODERATE 35: CPT

## 2024-10-31 PROCEDURE — 80048 BASIC METABOLIC PNL TOTAL CA: CPT | Performed by: INTERNAL MEDICINE

## 2024-10-31 PROCEDURE — 82607 VITAMIN B-12: CPT

## 2024-10-31 PROCEDURE — 85027 COMPLETE CBC AUTOMATED: CPT | Performed by: INTERNAL MEDICINE

## 2024-10-31 PROCEDURE — 86592 SYPHILIS TEST NON-TREP QUAL: CPT

## 2024-10-31 PROCEDURE — 25010000002 THIAMINE HCL 200 MG/2ML SOLUTION: Performed by: INTERNAL MEDICINE

## 2024-10-31 PROCEDURE — 25010000002 HEPARIN (PORCINE) PER 1000 UNITS: Performed by: INTERNAL MEDICINE

## 2024-10-31 PROCEDURE — 84443 ASSAY THYROID STIM HORMONE: CPT

## 2024-10-31 PROCEDURE — 82746 ASSAY OF FOLIC ACID SERUM: CPT

## 2024-10-31 PROCEDURE — G0432 EIA HIV-1/HIV-2 SCREEN: HCPCS

## 2024-10-31 RX ORDER — QUETIAPINE FUMARATE 25 MG/1
25 TABLET, FILM COATED ORAL NIGHTLY
Status: DISCONTINUED | OUTPATIENT
Start: 2024-10-31 | End: 2024-11-04 | Stop reason: HOSPADM

## 2024-10-31 RX ORDER — QUETIAPINE FUMARATE 25 MG/1
12.5 TABLET, FILM COATED ORAL DAILY
Status: DISCONTINUED | OUTPATIENT
Start: 2024-10-31 | End: 2024-11-02

## 2024-10-31 RX ADMIN — HEPARIN SODIUM 5000 UNITS: 5000 INJECTION INTRAVENOUS; SUBCUTANEOUS at 15:22

## 2024-10-31 RX ADMIN — Medication 10 ML: at 09:20

## 2024-10-31 RX ADMIN — ROSUVASTATIN 10 MG: 10 TABLET, FILM COATED ORAL at 09:14

## 2024-10-31 RX ADMIN — SENNOSIDES AND DOCUSATE SODIUM 2 TABLET: 50; 8.6 TABLET ORAL at 20:54

## 2024-10-31 RX ADMIN — DONEPEZIL HYDROCHLORIDE 10 MG: 10 TABLET, FILM COATED ORAL at 09:14

## 2024-10-31 RX ADMIN — LORAZEPAM 1 MG: 1 TABLET ORAL at 09:22

## 2024-10-31 RX ADMIN — THIAMINE HYDROCHLORIDE 200 MG: 100 INJECTION, SOLUTION INTRAMUSCULAR; INTRAVENOUS at 05:37

## 2024-10-31 RX ADMIN — Medication 10 ML: at 20:55

## 2024-10-31 RX ADMIN — FOLIC ACID 1 MG: 1 TABLET ORAL at 09:14

## 2024-10-31 RX ADMIN — ASPIRIN 81 MG 81 MG: 81 TABLET ORAL at 09:14

## 2024-10-31 RX ADMIN — LORAZEPAM 1 MG: 1 TABLET ORAL at 06:44

## 2024-10-31 RX ADMIN — HEPARIN SODIUM 5000 UNITS: 5000 INJECTION INTRAVENOUS; SUBCUTANEOUS at 20:53

## 2024-10-31 RX ADMIN — QUETIAPINE FUMARATE 12.5 MG: 25 TABLET ORAL at 15:22

## 2024-10-31 RX ADMIN — POLYETHYLENE GLYCOL 3350 17 G: 17 POWDER, FOR SOLUTION ORAL at 05:46

## 2024-10-31 RX ADMIN — HEPARIN SODIUM 5000 UNITS: 5000 INJECTION INTRAVENOUS; SUBCUTANEOUS at 05:37

## 2024-10-31 RX ADMIN — PREGABALIN 50 MG: 25 CAPSULE ORAL at 15:25

## 2024-10-31 RX ADMIN — PREGABALIN 50 MG: 25 CAPSULE ORAL at 20:54

## 2024-10-31 RX ADMIN — SENNOSIDES AND DOCUSATE SODIUM 2 TABLET: 50; 8.6 TABLET ORAL at 09:14

## 2024-10-31 RX ADMIN — LOSARTAN POTASSIUM 25 MG: 25 TABLET, FILM COATED ORAL at 09:14

## 2024-10-31 RX ADMIN — PREGABALIN 50 MG: 25 CAPSULE ORAL at 09:14

## 2024-10-31 NOTE — CASE MANAGEMENT/SOCIAL WORK
Continued Stay Note  [facility]     Patient Name: Monique Betts  MRN: [id number]  Today's Date: 10/31/2024    Admit Date: 10/25/2024    Plan: Rehab   Discharge Plan       Row Name 10/31/24 1325       Plan    Plan Rehab    Plan Comments Per discussion in [facility], CIWA today was 5 (nausea, HA, confusion). She is on RA. Per April, [facility] cannot accept Pt with active hallucinations; son aware. I left a message for Liza with [facility]. I spoke with son, Vivek, via telephone. At son's request, referrals were called to James with [facility], Lucy with [facility], and Carmen with [facility]. CM will continue to follow.    Final Discharge Disposition Code 03 - skilled nursing facility (SNF)                   Discharge Codes    No documentation.                 Expected Discharge Date and Time       Expected Discharge Date Expected Discharge Time    Nov 1, 2024               Barbara Molina RN

## 2024-10-31 NOTE — PROGRESS NOTES
[facility] Medicine Services  PROGRESS NOTE    Patient Name: Monique Betts  : 1935  MRN: [id number]    Date of Admission: 10/25/2024  Primary Care Physician: Snea Lomeli MD    Subjective   Subjective     CC: Follow-up weakness, hallucinations    HPI:   Patient only oriented to year this morning. She reports seeing people in her room.     Objective   Objective     Vital Signs:   Temp:  [98.1 °F (36.7 °C)-98.9 °F (37.2 °C)] 98.2 °F (36.8 °C)  Heart Rate:  [58-83] 71  Resp:  [16-18] 16  BP: (107-156)/() 115/44  Flow (L/min) (Oxygen Therapy):  [2] 2     Physical Exam:  Constitutional: Chronically ill-appearing elderly female in NAD breathing on 2L NC  HENT: NCAT, mucous membranes moist  Respiratory: Clear to auscultation bilaterally, respiratory effort normal   Cardiovascular: RRR, no murmurs, rubs, or gallops, no bilateral ankle edema  Gastrointestinal: Positive bowel sounds, soft, nontender, nondistended  Musculoskeletal: Decreased muscle tone throughout  Psychiatric: Appropriate affect, cooperative  Neurologic: Oriented to year, nonfocal      Results Reviewed:  LAB RESULTS:      Lab 10/31/24  0457 10/26/24  0524 10/25/24  1808   WBC 6.34 4.94 6.39   HEMOGLOBIN 12.7 12.4 13.3   HEMATOCRIT 38.0 37.3 41.1   PLATELETS 149 177 203   NEUTROS ABS  --  1.69* 2.94   IMMATURE GRANS (ABS)  --  0.01 0.01   LYMPHS ABS  --  2.54 2.64   MONOS ABS  --  0.55 0.65   EOS ABS  --  0.12 0.09   MCV 96.4 97.4* 97.4*   PROCALCITONIN  --   --  0.06   LACTATE  --   --  1.1         Lab 10/31/24  0457 10/28/24  1319 10/26/24  0524 10/25/24  1808   SODIUM 141 140 141 142   POTASSIUM 3.9 4.0 4.3 4.1   CHLORIDE 105 102 105 105   CO2 25.0 27.0 28.0 28.0   ANION GAP 11.0 11.0 8.0 9.0   BUN 22 17 22 22   CREATININE 0.93 0.82 1.05* 1.00   EGFR 59.2* 68.9 51.2* 54.3*   GLUCOSE 112* 126* 124* 169*   CALCIUM 9.2 9.7 8.8 9.6         Lab 10/25/24  1808   TOTAL PROTEIN 6.8   ALBUMIN 4.1   GLOBULIN 2.7   ALT  (SGPT) 15   AST (SGOT) 21   BILIRUBIN 0.2   ALK PHOS 66                     Brief Urine Lab Results  (Last result in the past 365 days)        Color   Clarity   Blood   Leuk Est   Nitrite   Protein   CREAT   Urine HCG        10/25/24 1645 Yellow   Clear   Trace   Moderate (2+)   Negative   Trace                   Microbiology Results Abnormal       None            MRI Lumbar Spine Without Contrast    Result Date: 10/30/2024  MRI LUMBAR SPINE WO CONTRAST Date of Exam: 10/30/2024 9:10 AM EDT Indication: L-spine disease evaluation, h/o L-spine stenosis and radiculopathy.  Comparison: 10/25/2024 CT Technique:  Routine multiplanar/multisequence sequence images of the lumbar spine were obtained without contrast administration.  Findings: No evidence of acute fracture. There is grade 1 anterolisthesis of L4 on L5 measuring 6 mm. No evidence of additional listhesis or malalignment. No suspicious bone lesion. The included spinal cord is normal in signal and appearance. The conus medullaris terminates at L1-2. Nerve roots of the cauda equina are unremarkable. Paraspinal soft tissues show no acute abnormality. Level by level detail as follows: L1-2: Mild facet arthropathy. No significant canal or significant neural foraminal narrowing. L2-3: Mild facet arthropathy. No significant canal or significant neural foraminal narrowing. L3-4: Mild broad-based disc bulge with superimposed left paracentral/foraminal disc protrusion with mild effacement of the left lateral recess. There is mild bilateral facet arthropathy. Mild bilateral neural foraminal narrowing. L4-5: Grade 1 anterolisthesis with uncovering of the posterior superior disc. There is bilateral facet arthropathy with small facet joint effusions. Mild canal narrowing. Mild bilateral neuroforaminal narrowing. L5-S1: Broad-based disc bulge with bilateral facet arthropathy. No significant canal narrowing. Mild right neuroforaminal narrowing.     Impression: Impression:  Degenerative changes of the lumbar spine including grade 1 anterolisthesis of L4-5. There is mild L4-5 canal narrowing and effacement of the left lateral recess at L3-4. Varying multilevel neural foraminal narrowing, mild bilaterally at L3-4 and L4-5 as well as mild on the right at L5-S1. Level-by-level details as above. Electronically Signed: Fred Keller MD  10/30/2024 11:09 AM EDT  Workstation ID: [id number]     Results for orders placed during the hospital encounter of 10/25/24    Adult Transthoracic Echo Complete W/ Cont if Necessary Per Protocol    Interpretation Summary    Left ventricular ejection fraction appears to be 56 - 60%.    Estimated right ventricular systolic pressure from tricuspid regurgitation is normal (<35 mmHg).    No significant valvular disease      Current medications:  Scheduled Meds:aspirin, 81 mg, Oral, Daily  donepezil, 10 mg, Oral, Daily  folic acid, 1 mg, Oral, Daily  heparin (porcine), 5,000 Units, Subcutaneous, Q8H  losartan, 25 mg, Oral, Q24H  pregabalin, 50 mg, Oral, TID  QUEtiapine, 12.5 mg, Oral, Daily  QUEtiapine, 25 mg, Oral, Nightly  rosuvastatin, 10 mg, Oral, Daily  senna-docusate sodium, 2 tablet, Oral, BID  sodium chloride, 10 mL, Intravenous, Q12H  [START ON 11/1/2024] thiamine (B-1) IV, 250 mg, Intravenous, Daily      Continuous Infusions:   PRN Meds:.  acetaminophen **OR** acetaminophen **OR** acetaminophen    senna-docusate sodium **AND** polyethylene glycol **AND** bisacodyl **AND** bisacodyl    HYDROcodone-acetaminophen    hydrOXYzine    Magnesium Standard Dose Replacement - Follow Nurse / BPA Driven Protocol    melatonin    nitroglycerin    ondansetron    ondansetron ODT    [COMPLETED] Insert Peripheral IV **AND** sodium chloride    sodium chloride    Assessment & Plan   Assessment & Plan     Active Hospital Problems    Diagnosis  POA    **Failure to thrive in adult [R62.7]  Yes    UTI (urinary tract infection) [N39.0]  Yes    Cognitive decline [R41.89]  Yes    GERD  without esophagitis [K21.9]  Yes    Dyslipidemia, goal LDL below 70 [E78.5]  Yes    Essential hypertension [I10]  Yes    Coronary artery disease involving native coronary artery of native heart without angina pectoris [I25.10]  Yes      Resolved Hospital Problems   No resolved problems to display.        Brief Hospital Course to date:  Monique Betts is a 89 y.o. female w CAD s/p PCI, cognitive decline, chronic back pain, chronic urinary incontinence s/p bladder stimulator who presented from PMR office 10/25 w inability to walk.Limited note from Dr Chatman 10/26 - have HPI but not assessment to see exact reason/concern for transport.  Patient also reported to be having some hallucinations    EtOH abuse with suspected withdrawal  ? Hypoactive delirium  - Patient having hallucinations of people in her room  - Patient reportedly only drinks 1 shot per day, poor historian  - No tremors or tachycardia noted on exam  - Discontinue CIWA protocol  - Check TSH, Vitamin B12, Folate, RPR  - Continue multivitamins, thiamine x 3 days, folic acid  - Start Seroquel 12.5 mg every morning and 25 mg at night    Inability to walk  H/o chronic back pain, L5/S1 radiculopathy  - Patient denies orthostasis, denies worsening back pain or saddle anesthesia   - Ddx broad, challenging w limited history able to obtain: possible acute on chronic L-spine dz (though do not suspect cauda equina given exam + patient report currently, current 5/5 strength LE, CT spine also reassuring), L-shoulder injury contributing (see below), possible cardiac or pulm sx contributing to weakness, orthostasis  - MRI L-spine 10/30/2024 with degenerative changes of lumbar spine  - She may need to just follow-up with outpatient Ortho for her usual SI joint injections   - Obtain orthostatic vital signs, f/u borderline bradycardia  - Repeat echo shows 56 to 60%, otherwise normal  - PT/OT consulted, recommend inpatient rehabilitation, CM following     Left shoulder  pain  - Denies any trauma,  - Shoulder x-ray with mild shoulder joint DJD no evidence of any acute process      Heart murmur  - Reviewed 2022 echo, may be from AV sclerosis  - Echo as above     Possible UTI   - Appears less likely dx clinically, urine cultures NGTD     Bradycardia   - Heart rate is currently stable, will continue to monitor  - Hold donepezil if persistent/contributing to sx above     Chronic urinary incontinence s/p bladder stim   - Will need MRI pre-approval/screening     Cognitive impairment   - Continue donepezil. If bradycardia persists may need to hold  .  CAD s/p PCI   - Continue statin and ASA    Expected Discharge Location and Transportation: Rehab  Expected Discharge   Expected Discharge Date: 11/3/2024; Expected Discharge Time:      VTE Prophylaxis:  Pharmacologic & mechanical VTE prophylaxis orders are present.         AM-PAC 6 Clicks Score (PT): 17 (10/31/24 6255)    CODE STATUS:   Code Status and Medical Interventions: No CPR (Do Not Attempt to Resuscitate); Limited Support; No intubation (DNI)   Ordered at: 10/25/24 3806     Medical Intervention Limits:    No intubation (DNI)     Code Status (Patient has no pulse and is not breathing):    No CPR (Do Not Attempt to Resuscitate)     Medical Interventions (Patient has pulse or is breathing):    Limited Support       Yohana Ma PA-C  10/31/24

## 2024-10-31 NOTE — PLAN OF CARE
Problem: Adult Inpatient Plan of Care  Goal: Plan of Care Review  Flowsheets (Taken 10/31/2024 5324)  Progress: no change  Outcome Evaluation: Patient oriented to self only. CIWA at beginning of shift 5. No s/s of pain/discomfort noted and no acute events this shift. Patient remains on RA, VSS, NSR on tele. Continue with POC.  Plan of Care Reviewed With:   patient   child   Goal Outcome Evaluation:  Plan of Care Reviewed With: patient, child        Progress: no change  Outcome Evaluation: Patient oriented to self only. CIWA at beginning of shift 5. No s/s of pain/discomfort noted and no acute events this shift. Patient remains on RA, VSS, NSR on tele. Continue with POC.

## 2024-10-31 NOTE — PLAN OF CARE
Goal Outcome Evaluation:   Pt A&O x 2-4, occasionally disoriented to time & situation. No hallucinations, CIWA 0-9, PRN ativan given x 2. RA/ 2L NC. UOP of 450 mL with multiple occurrences. No BM, miralax given. VSS.

## 2024-11-01 LAB — RPR SER QL: NORMAL

## 2024-11-01 PROCEDURE — 97530 THERAPEUTIC ACTIVITIES: CPT

## 2024-11-01 PROCEDURE — 99232 SBSQ HOSP IP/OBS MODERATE 35: CPT

## 2024-11-01 PROCEDURE — 97110 THERAPEUTIC EXERCISES: CPT

## 2024-11-01 PROCEDURE — 97535 SELF CARE MNGMENT TRAINING: CPT

## 2024-11-01 PROCEDURE — 25010000002 THIAMINE PER 100 MG

## 2024-11-01 PROCEDURE — 25010000002 HEPARIN (PORCINE) PER 1000 UNITS: Performed by: INTERNAL MEDICINE

## 2024-11-01 RX ORDER — HYDROCODONE BITARTRATE AND ACETAMINOPHEN 7.5; 325 MG/1; MG/1
1 TABLET ORAL EVERY 6 HOURS PRN
Status: DISCONTINUED | OUTPATIENT
Start: 2024-11-01 | End: 2024-11-04 | Stop reason: HOSPADM

## 2024-11-01 RX ADMIN — Medication 10 ML: at 09:41

## 2024-11-01 RX ADMIN — SENNOSIDES AND DOCUSATE SODIUM 2 TABLET: 50; 8.6 TABLET ORAL at 21:37

## 2024-11-01 RX ADMIN — SENNOSIDES AND DOCUSATE SODIUM 2 TABLET: 50; 8.6 TABLET ORAL at 09:40

## 2024-11-01 RX ADMIN — PREGABALIN 50 MG: 25 CAPSULE ORAL at 17:25

## 2024-11-01 RX ADMIN — HYDROCODONE BITARTRATE AND ACETAMINOPHEN 1 TABLET: 7.5; 325 TABLET ORAL at 15:19

## 2024-11-01 RX ADMIN — BISACODYL 5 MG: 5 TABLET, COATED ORAL at 23:53

## 2024-11-01 RX ADMIN — QUETIAPINE FUMARATE 12.5 MG: 25 TABLET ORAL at 09:40

## 2024-11-01 RX ADMIN — QUETIAPINE FUMARATE 25 MG: 25 TABLET ORAL at 21:37

## 2024-11-01 RX ADMIN — HEPARIN SODIUM 5000 UNITS: 5000 INJECTION INTRAVENOUS; SUBCUTANEOUS at 06:06

## 2024-11-01 RX ADMIN — PREGABALIN 50 MG: 25 CAPSULE ORAL at 09:40

## 2024-11-01 RX ADMIN — HEPARIN SODIUM 5000 UNITS: 5000 INJECTION INTRAVENOUS; SUBCUTANEOUS at 14:52

## 2024-11-01 RX ADMIN — HEPARIN SODIUM 5000 UNITS: 5000 INJECTION INTRAVENOUS; SUBCUTANEOUS at 21:37

## 2024-11-01 RX ADMIN — LOSARTAN POTASSIUM 25 MG: 25 TABLET, FILM COATED ORAL at 09:40

## 2024-11-01 RX ADMIN — FOLIC ACID 1 MG: 1 TABLET ORAL at 09:40

## 2024-11-01 RX ADMIN — DONEPEZIL HYDROCHLORIDE 10 MG: 10 TABLET, FILM COATED ORAL at 09:40

## 2024-11-01 RX ADMIN — THIAMINE HYDROCHLORIDE 250 MG: 100 INJECTION, SOLUTION INTRAMUSCULAR; INTRAVENOUS at 09:40

## 2024-11-01 RX ADMIN — Medication 10 ML: at 21:38

## 2024-11-01 RX ADMIN — PREGABALIN 50 MG: 25 CAPSULE ORAL at 21:38

## 2024-11-01 RX ADMIN — ASPIRIN 81 MG 81 MG: 81 TABLET ORAL at 09:40

## 2024-11-01 RX ADMIN — HYDROCODONE BITARTRATE AND ACETAMINOPHEN 1 TABLET: 7.5; 325 TABLET ORAL at 23:53

## 2024-11-01 RX ADMIN — ROSUVASTATIN 10 MG: 10 TABLET, FILM COATED ORAL at 09:40

## 2024-11-01 NOTE — PLAN OF CARE
Problem: Adult Inpatient Plan of Care  Goal: Plan of Care Review  Outcome: Progressing  Flowsheets (Taken 11/1/2024 1801)  Progress: improving  Outcome Evaluation: Patient more alert and oriented x3 this shift. No signs of hallucinations noted. Patient stated having some pain in left shoulder, PRN norco given with relief. No acute events. Patient remains on RA, VSS, NSR. Will continue with POC.  Plan of Care Reviewed With: patient   Goal Outcome Evaluation:  Plan of Care Reviewed With: patient        Progress: improving  Outcome Evaluation: Patient more alert and oriented x3 this shift. No signs of hallucinations noted. Patient stated having some pain in left shoulder, PRN norco given with relief. No acute events. Patient remains on RA, VSS, NSR. Will continue with POC.

## 2024-11-01 NOTE — PLAN OF CARE
Goal Outcome Evaluation:              Outcome Evaluation: Patient alert x 2-3. Disoriented to time, place, situation. Is hallucinating at times during night seeing things on the ceiling. Has not slept much during night. Attempting to get out of bed to drive home. On room air and sinus on tele. Purewick on with good output to wall suction cannister. Son supportive.

## 2024-11-01 NOTE — THERAPY TREATMENT NOTE
Patient Name: Monique Betts  : 1935    MRN: [id number]                              Today's Date: 2024       Admit Date: 10/25/2024    Visit Dx:     ICD-10-CM ICD-9-CM   1. Acute on chronic low back pain  M54.50 724.2    G89.29 338.19     338.29   2. Acute UTI  N39.0 599.0     Patient Active Problem List   Diagnosis    NSTEMI (non-ST elevated myocardial infarction)    COVID-19 virus infection    Coronary artery disease involving native coronary artery of native heart without angina pectoris    Dyslipidemia, goal LDL below 70    Essential hypertension    UTI (urinary tract infection)    Failure to thrive in adult    Cognitive decline    GERD without esophagitis     Past Medical History:   Diagnosis Date    Cancer     Heart murmur     Skin Cancer    Sleep apnea     Not using machine for several years.     Past Surgical History:   Procedure Laterality Date    CARDIAC CATHETERIZATION N/A 2022    Procedure: LEFT HEART CATH;  Surgeon: Antonino Koo MD;  Location: [facility] CATH INVASIVE LOCATION;  Service: Cardiovascular;  Laterality: N/A;      General Information       Row Name 24 1153          OT Time and Intention    Subjective Information complains of;pain  -FAM     Document Type therapy note (daily note)  -FAM     Mode of Treatment occupational therapy  -FAM     Patient Effort good  -FAM     Comment pt. with multiplet body pain reports changing one minute to the next  -FAM       Row Name 24 1150          General Information    Patient Profile Reviewed yes  -FAM     Existing Precautions/Restrictions fall;oxygen therapy device and L/min;other (see comments)  L UE/shoulder pain with movement  -FAM     Barriers to Rehab medically complex  -FAM       Row Name 24 0648          Cognition    Orientation Status (Cognition) oriented to;person;disoriented to;place;time;situation;verbal cues/prompts needed for orientation  pt. knew she was in a hospital, but not which one, thought it was  December 2025.  -FAM       Row Name 11/01/24 1153          Safety Issues/Impairments Affecting Functional Mobility    Safety Issues Affecting Function (Mobility) ability to follow commands;awareness of need for assistance;insight into deficits/self-awareness;judgment;problem-solving;positioning of assistive device;safety precaution awareness;sequencing abilities;safety precautions follow-through/compliance;at risk behavior observed  -FAM     Impairments Affecting Function (Mobility) balance;cognition;endurance/activity tolerance;strength;pain;postural/trunk control;shortness of breath;motor control;range of motion (ROM)  -FAM     Cognitive Impairments, Mobility Safety/Performance attention;awareness, need for assistance;insight into deficits/self-awareness;judgment;problem-solving/reasoning;safety precaution awareness;safety precaution follow-through;sequencing abilities  -               User Key  (r) = Recorded By, (t) = Taken By, (c) = Cosigned By      Initials Name Provider Type    Kerry Pérez, OT Occupational Therapist                     Mobility/ADL's       Row Name 11/01/24 1156          Bed Mobility    Bed Mobility supine-sit;sit-supine  -FAM     Supine-Sit [place] (Bed Mobility) minimum assist (75% patient effort);1 person assist;nonverbal cues (demo/gesture);verbal cues  -FAM     Sit-Supine [place] (Bed Mobility) minimum assist (75% patient effort);1 person assist;verbal cues  -     Bed Mobility, Safety Issues decreased use of arms for pushing/pulling;cognitive deficits limit understanding  -     Assistive Device (Bed Mobility) bed rails;head of bed elevated  -FAM     Comment, (Bed Mobility) some cueing to intiate and sequence  -       Row Name 11/01/24 1156          Transfers    Transfers sit-stand transfer;stand-sit transfer;toilet transfer  -FAM     Comment, (Transfers) cues needed for walker and hand placement, cues to fully back to surface prior to sitting  -       Row Name  11/01/24 1156          Bed-Chair Transfer    Assistive Device (Bed-Chair Transfers) walker, front-wheeled  -       Row Name 11/01/24 1156          Sit-Stand Transfer    Sit-Stand [place] (Transfers) minimum assist (75% patient effort);verbal cues;1 person assist;nonverbal cues (demo/gesture)  -     Assistive Device (Sit-Stand Transfers) walker, front-wheeled  -       Row Name 11/01/24 1156          Stand-Sit Transfer    Stand-Sit [place] (Transfers) minimum assist (75% patient effort);verbal cues;2 person assist  -     Assistive Device (Stand-Sit Transfers) walker, front-wheeled  -       Row Name 11/01/24 1156          Toilet Transfer    Type (Toilet Transfer) sit-stand;stand-sit  -     [place] Level (Toilet Transfer) minimum assist (75% patient effort);2 person assist;verbal cues  -     Assistive Device (Toilet Transfer) commode;walker, front-wheeled;grab bars/safety frame  -       Row Name 11/01/24 1156          Functional Mobility    Functional Mobility- Ind. Level minimum assist (75% patient effort);2 person assist required;verbal cues required;nonverbal cues required (demo/gesture)  -     Functional Mobility- Device walker, front-wheeled  -     Functional Mobility-Distance (Feet) 10  + 10  -     Functional Mobility- Safety Issues step length decreased;sequencing ability decreased;loses balance backward;balance decreased during turns  -     Functional Mobility- Comment some assist to steer walker and to block it so pt. did not push it to far in front of her, cues to step up in walker needed  -       Row Name 11/01/24 1156          Activities of Daily Living    BADL Assessment/Intervention grooming;lower body dressing;toileting  -       Row Name 11/01/24 1156          Lower Body Dressing Assessment/Training    [place] Level (Lower Body Dressing) don;socks;dependent (less than 25% patient effort)  -     Position (Lower Body Dressing) long sitting  -       Row  Name 11/01/24 1156          Grooming Assessment/Training    [place] Level (Grooming) hair care, combing/brushing;moderate assist (50% patient effort);wash face, hands;set up  -FAM     Position (Grooming) unsupported sitting  -FAM     Comment, (Grooming) pt. with massive tangles in back of head, pt. able to brush sides, OT attempted to loosen large hair mass in back, but could only partially complete due to severity  -       Row Name 11/01/24 1156          Toileting Assessment/Training    [place] Level (Toileting) adjust/manage clothing;perform perineal hygiene;minimum assist (75% patient effort)  -FAM     Position (Toileting) supported sitting;supported standing  -FAM     Comment, (Toileting) pt. needed assist to lean to wipe, when pt. stood OT wiped over to ensure thoroughness  -               User Key  (r) = Recorded By, (t) = Taken By, (c) = Cosigned By      Initials Name Provider Type    FAM Kerry Mayo, OT Occupational Therapist                   Obj/Interventions       Row Name 11/01/24 1201          Shoulder (Therapeutic Exercise)    Shoulder (Therapeutic Exercise) AAROM (active assistive range of motion);AROM (active range of motion)  -     Shoulder AROM (Therapeutic Exercise) right;flexion;extension;aBduction;aDduction;horizontal aBduction/aDduction;supine;10 repetitions  -     Shoulder AAROM (Therapeutic Exercise) left  small circling completed only due to pain  -       Row Name 11/01/24 1201          Elbow/Forearm (Therapeutic Exercise)    Elbow/Forearm (Therapeutic Exercise) AROM (active range of motion)  -     Elbow/Forearm AROM (Therapeutic Exercise) bilateral;flexion;extension;supine;10 repetitions  -       Row Name 11/01/24 1201          Motor Skills    Therapeutic Exercise shoulder;elbow/forearm;wrist  -       Row Name 11/01/24 1201          Balance    Static Sitting Balance standby assist  -     Dynamic Sitting Balance minimal assist  assist to shift on toitet needed to  wipe  -FAM     Position, Sitting Balance supported;unsupported;sitting edge of bed;other (see comments)  toilet  -FAM     Static Standing Balance minimal assist;1-person assist  -FAM     Dynamic Standing Balance minimal assist;2-person assist;verbal cues  -FAM     Position/Device Used, Standing Balance supported;walker, front-wheeled  -FAM     Balance Interventions sit to stand;occupation based/functional task  -FAM     Comment, Balance toileting, grooming  -FAM               User Key  (r) = Recorded By, (t) = Taken By, (c) = Cosigned By      Initials Name Provider Type    Kerry Pérez, OT Occupational Therapist                   Goals/Plan       Row Name 11/01/24 1207          Transfer Goal 1 (OT)    Progress/Outcome (Transfer Goal 1, OT) goal ongoing  -FAM       Row Name 11/01/24 1207          Dressing Goal 1 (OT)    Progress/Outcome (Dressing Goal 1, OT) goal ongoing  -FAM       Row Name 11/01/24 1207          Grooming Goal 1 (OT)    Progress/Outcome (Grooming Goal 1, OT) goal ongoing  -FAM       Row Name 11/01/24 1207          ROM Goal 1 (OT)    Progress/Outcome (ROM Goal 1, OT) continuing progress toward goal;goal ongoing  -               User Key  (r) = Recorded By, (t) = Taken By, (c) = Cosigned By      Initials Name Provider Type    Kerry Pérez, OT Occupational Therapist                   Clinical Impression       Row Name 11/01/24 1204          Pain Assessment    Pain Side/Orientation generalized  most complaints were about back, LUE and throat  -     Pain Management Interventions exercise or physical activity utilized;positioning techniques utilized  -     Pre/Posttreatment Pain Comment no change in facial expression, pt. did not report an increase  -     Additional Documentation Pain Scale: FACES Pre/Post-Treatment (Group)  -       Row Name 11/01/24 1204          Pain Scale: FACES Pre/Post-Treatment    Pain: FACES Scale, Pretreatment 2-->hurts little bit  -FAM     Posttreatment Pain Rating  2-->hurts little bit  -FAM       Row Name 11/01/24 1204          Plan of Care Review    Plan of Care Reviewed With patient  -FAM     Progress no change  -FAM     Outcome Evaluation Patient was oriented to self and type of place only and often repeating same questions over and over.  Pt. was able to go to toilet and back and participate in grooming, toileting and UE TE, but cues needed for sequencing, safety and maintainting attention to task at times.  Pt. remains appropriate for skilled OT services to address deficit areas and promote return to PLOF. SNF recommended at discharge.  -FAM       Row Name 11/01/24 1204          Therapy Plan Review/Discharge Plan (OT)    Anticipated Discharge Disposition (OT) skilled nursing facility  -FAM       Row Name 11/01/24 1204          Vital Signs    Pre Systolic BP Rehab 140  -FAM     Pre Treatment Diastolic BP 61  -FAM     O2 Delivery Pre Treatment room air  -FAM     O2 Delivery Intra Treatment room air  -FAM     O2 Delivery Post Treatment room air  -FAM     Pre Patient Position Supine  -FAM     Intra Patient Position Standing  -FAM     Post Patient Position Supine  -FAM       Row Name 11/01/24 1204          Positioning and Restraints    Pre-Treatment Position in bed  -FAM     Post Treatment Position bed  -FAM     In Bed sitting;call light within reach;encouraged to call for assist;exit alarm on;side rails up x3  -FAM               User Key  (r) = Recorded By, (t) = Taken By, (c) = Cosigned By      Initials Name Provider Type    Kerry Pérez, OT Occupational Therapist                   Outcome Measures       Row Name 11/01/24 1208          How much help from another is currently needed...    Putting on and taking off regular lower body clothing? 2  -FAM     Bathing (including washing, rinsing, and drying) 2  -FAM     Toileting (which includes using toilet bed pan or urinal) 2  -FAM     Putting on and taking off regular upper body clothing 2  -FAM     Taking care of personal grooming (such as  brushing teeth) 2  -FAM     Eating meals 3  -FAM     AM-PAC 6 Clicks Score (OT) 13  -FAM       Row Name 11/01/24 0855          How much help from another person do you currently need...    Turning from your back to your side while in flat bed without using bedrails? 3  -JBA     Moving from lying on back to sitting on the side of a flat bed without bedrails? 3  -JBA     Moving to and from a bed to a chair (including a wheelchair)? 2  -JBA     Standing up from a chair using your arms (e.g., wheelchair, bedside chair)? 3  -JBA     Climbing 3-5 steps with a railing? 2  -JBA     To walk in hospital room? 2  -JBA     AM-PAC 6 Clicks Score (PT) 15  -JBA       Row Name 11/01/24 1208          Functional Assessment    Outcome Measure Options AM-PAC 6 Clicks Daily Activity (OT)  -FAM               User Key  (r) = Recorded By, (t) = Taken By, (c) = Cosigned By      Initials Name Provider Type    Kerry Pérez, NICHOLAS Occupational Therapist    Mary Ann Berrios, RN Registered Nurse                    Occupational Therapy Education       Title: PT OT SLP Therapies (In Progress)       Topic: Occupational Therapy (In Progress)       Point: ADL training (In Progress)       Description:   Instruct learner(s) on proper safety adaptation and remediation techniques during self care or transfers.   Instruct in proper use of assistive devices.                  Learning Progress Summary            Patient Acceptance, E,D, NR by FAM at 11/1/2024 1208    Comment: Re-orientation, transfer, BADL and walker safety and sequencing, UE TE    Acceptance, E,D, NR,VU by FAM at 10/26/2024 1413    Comment: reason for consult, noted deficits, benefit of activity, walker safety                      Point: Home exercise program (In Progress)       Description:   Instruct learner(s) on appropriate technique for monitoring, assisting and/or progressing therapeutic exercises/activities.                  Learning Progress Summary            Patient Acceptance,  E,D, NR by FAM at 11/1/2024 1208    Comment: Re-orientation, transfer, BADL and walker safety and sequencing, UE TE    Acceptance, E, NL by  at 10/29/2024 1225    Comment: therex                      Point: Precautions (In Progress)       Description:   Instruct learner(s) on prescribed precautions during self-care and functional transfers.                  Learning Progress Summary            Patient Acceptance, E,D, NR by FAM at 11/1/2024 1208    Comment: Re-orientation, transfer, BADL and walker safety and sequencing, UE TE    Acceptance, E,D, NR,VU by FAM at 10/26/2024 1413    Comment: reason for consult, noted deficits, benefit of activity, walker safety                      Point: Body mechanics (Done)       Description:   Instruct learner(s) on proper positioning and spine alignment during self-care, functional mobility activities and/or exercises.                  Learning Progress Summary            Patient Acceptance, E,D, NR,VU by FAM at 10/26/2024 1413    Comment: reason for consult, noted deficits, benefit of activity, walker safety                                      User Key       Initials Effective Dates Name Provider Type Discipline    FAM 07/11/23 -  Kerry Mayo, OT Occupational Therapist OT     02/03/23 -  Jenni Slater OT Occupational Therapist OT                  OT Recommendation and Plan  Planned Therapy Interventions (OT): activity tolerance training, BADL retraining, adaptive equipment training, occupation/activity based interventions, patient/caregiver education/training, strengthening exercise, transfer/mobility retraining, ROM/therapeutic exercise, functional balance retraining, neuromuscular control/coordination retraining  Therapy Frequency (OT): daily  Plan of Care Review  Plan of Care Reviewed With: patient  Progress: no change  Outcome Evaluation: Patient was oriented to self and type of place only and often repeating same questions over and over.  Pt. was able to go to  toilet and back and participate in grooming, toileting and UE TE, but cues needed for sequencing, safety and maintainting attention to task at times.  Pt. remains appropriate for skilled OT services to address deficit areas and promote return to PLOF. SNF recommended at discharge.     Time Calculation:   Evaluation Complexity (OT)  Review Occupational Profile/Medical/Therapy History Complexity: expanded/moderate complexity  Assessment, Occupational Performance/Identification of Deficit Complexity: 3-5 performance deficits  Clinical Decision Making Complexity (OT): detailed assessment/moderate complexity  Overall Complexity of Evaluation (OT): moderate complexity     Time Calculation- OT       Row Name 11/01/24 1209             Time Calculation- OT    OT Start Time 1115  -FAM      OT Received On 11/01/24  -FAM      OT Goal Re-Cert Due Date 11/04/24  -FAM         Timed Charges    54267 - OT Therapeutic Exercise Minutes 8  -FAM      88141 - OT Self Care/Mgmt Minutes 17  -FAM         Total Minutes    Timed Charges Total Minutes 25  -FAM       Total Minutes 25  -FAM                User Key  (r) = Recorded By, (t) = Taken By, (c) = Cosigned By      Initials Name Provider Type    Kerry Pérez OT Occupational Therapist                  Therapy Charges for Today       Code Description Service Date Service Provider Modifiers Qty    [id number]  OT THER PROC EA 15 MIN 11/1/2024 Kerry Mayo OT GO 1    [id number]  OT SELF CARE/MGMT/TRAIN EA 15 MIN 11/1/2024 Kerry Mayo OT GO 1                 Kerry Mayo OT  11/1/2024

## 2024-11-01 NOTE — CASE MANAGEMENT/SOCIAL WORK
Continued Stay Note  [facility]     Patient Name: Monique Betts  MRN: [id number]  Today's Date: 11/1/2024    Admit Date: 10/25/2024    Plan: Rehab   Discharge Plan       Row Name 11/01/24 1129       Plan    Plan Rehab    Plan Comments Per discussion in [facility], Pt is still having hallucinations. She is more alert and less confused today. She is on room air. I spoke with son, Vivek, via telephone, to provide update. Per April at [facility] and Lucy with [facility], they are unable to accept Pt at this time due to hallucinations. Per Liza with [facility]/ and James with [facility], facilities do not currently have beds. I left a message for Carmen with [facility] to return my call regarding status of referral. CM will continue to follow.    Final Discharge Disposition Code 03 - skilled nursing facility (SNF)                   Discharge Codes    No documentation.                 Expected Discharge Date and Time       Expected Discharge Date Expected Discharge Time    Nov 3, 2024               Barbara Molina RN

## 2024-11-01 NOTE — PROGRESS NOTES
[facility] Medicine Services  PROGRESS NOTE    Patient Name: Monique Betts  : 1935  MRN: [id number]    Date of Admission: 10/25/2024  Primary Care Physician: Sena Lomeli MD    Subjective   Subjective     CC: Follow-up weakness, hallucinations    HPI:   Patient reports she slept well last night (nursing reports patient did not sleep and reported hallucinations of seeing things on the ceiling). Continuing to complain of left shoulder pain, previously evaluated with no acute process. More alert on my exam today. No family at bedside.    Objective   Objective     Vital Signs:   Temp:  [97.7 °F (36.5 °C)-98.4 °F (36.9 °C)] 97.7 °F (36.5 °C)  Heart Rate:  [58-80] 79  Resp:  [16-18] 18  BP: (115-153)/() 153/62     Physical Exam:  Constitutional: Chronically ill-appearing elderly female in NAD breathing on 2L NC  HENT: NCAT, mucous membranes moist  Respiratory: Clear to auscultation bilaterally, respiratory effort normal   Cardiovascular: RRR, murmur, rubs, or gallops, no bilateral ankle edema  Gastrointestinal: Positive bowel sounds, soft, nontender, nondistended  Musculoskeletal: Decreased muscle tone throughout, mild tenderness to palpation of left shoulder  Psychiatric: Appropriate affect, cooperative  Neurologic: Oriented to self and year, able to identify she was in a hospital however, unable to correctly identify which one    Results Reviewed:  LAB RESULTS:      Lab 10/31/24  0457 10/26/24  0524 10/25/24  1808   WBC 6.34 4.94 6.39   HEMOGLOBIN 12.7 12.4 13.3   HEMATOCRIT 38.0 37.3 41.1   PLATELETS 149 177 203   NEUTROS ABS  --  1.69* 2.94   IMMATURE GRANS (ABS)  --  0.01 0.01   LYMPHS ABS  --  2.54 2.64   MONOS ABS  --  0.55 0.65   EOS ABS  --  0.12 0.09   MCV 96.4 97.4* 97.4*   PROCALCITONIN  --   --  0.06   LACTATE  --   --  1.1         Lab 10/31/24  1255 10/31/24  0457 10/28/24  1319 10/26/24  0524 10/25/24  1808   SODIUM  --  141 140 141 142   POTASSIUM  --  3.9  4.0 4.3 4.1   CHLORIDE  --  105 102 105 105   CO2  --  25.0 27.0 28.0 28.0   ANION GAP  --  11.0 11.0 8.0 9.0   BUN  --  22 17 22 22   CREATININE  --  0.93 0.82 1.05* 1.00   EGFR  --  59.2* 68.9 51.2* 54.3*   GLUCOSE  --  112* 126* 124* 169*   CALCIUM  --  9.2 9.7 8.8 9.6   TSH 2.880  --   --   --   --          Lab 10/25/24  1808   TOTAL PROTEIN 6.8   ALBUMIN 4.1   GLOBULIN 2.7   ALT (SGPT) 15   AST (SGOT) 21   BILIRUBIN 0.2   ALK PHOS 66                 Lab 10/31/24  1255   FOLATE >20.00   VITAMIN B 12 864         Brief Urine Lab Results  (Last result in the past 365 days)        Color   Clarity   Blood   Leuk Est   Nitrite   Protein   CREAT   Urine HCG        10/25/24 1645 Yellow   Clear   Trace   Moderate (2+)   Negative   Trace                   Microbiology Results Abnormal       None            MRI Lumbar Spine Without Contrast    Result Date: 10/30/2024  MRI LUMBAR SPINE WO CONTRAST Date of Exam: 10/30/2024 9:10 AM EDT Indication: L-spine disease evaluation, h/o L-spine stenosis and radiculopathy.  Comparison: 10/25/2024 CT Technique:  Routine multiplanar/multisequence sequence images of the lumbar spine were obtained without contrast administration.  Findings: No evidence of acute fracture. There is grade 1 anterolisthesis of L4 on L5 measuring 6 mm. No evidence of additional listhesis or malalignment. No suspicious bone lesion. The included spinal cord is normal in signal and appearance. The conus medullaris terminates at L1-2. Nerve roots of the cauda equina are unremarkable. Paraspinal soft tissues show no acute abnormality. Level by level detail as follows: L1-2: Mild facet arthropathy. No significant canal or significant neural foraminal narrowing. L2-3: Mild facet arthropathy. No significant canal or significant neural foraminal narrowing. L3-4: Mild broad-based disc bulge with superimposed left paracentral/foraminal disc protrusion with mild effacement of the left lateral recess. There is mild  bilateral facet arthropathy. Mild bilateral neural foraminal narrowing. L4-5: Grade 1 anterolisthesis with uncovering of the posterior superior disc. There is bilateral facet arthropathy with small facet joint effusions. Mild canal narrowing. Mild bilateral neuroforaminal narrowing. L5-S1: Broad-based disc bulge with bilateral facet arthropathy. No significant canal narrowing. Mild right neuroforaminal narrowing.     Impression: Impression: Degenerative changes of the lumbar spine including grade 1 anterolisthesis of L4-5. There is mild L4-5 canal narrowing and effacement of the left lateral recess at L3-4. Varying multilevel neural foraminal narrowing, mild bilaterally at L3-4 and L4-5 as well as mild on the right at L5-S1. Level-by-level details as above. Electronically Signed: Fred Keller MD  10/30/2024 11:09 AM EDT  Workstation ID: [id number]     Results for orders placed during the hospital encounter of 10/25/24    Adult Transthoracic Echo Complete W/ Cont if Necessary Per Protocol    Interpretation Summary    Left ventricular ejection fraction appears to be 56 - 60%.    Estimated right ventricular systolic pressure from tricuspid regurgitation is normal (<35 mmHg).    No significant valvular disease      Current medications:  Scheduled Meds:aspirin, 81 mg, Oral, Daily  donepezil, 10 mg, Oral, Daily  folic acid, 1 mg, Oral, Daily  heparin (porcine), 5,000 Units, Subcutaneous, Q8H  losartan, 25 mg, Oral, Q24H  pregabalin, 50 mg, Oral, TID  QUEtiapine, 12.5 mg, Oral, Daily  QUEtiapine, 25 mg, Oral, Nightly  rosuvastatin, 10 mg, Oral, Daily  senna-docusate sodium, 2 tablet, Oral, BID  sodium chloride, 10 mL, Intravenous, Q12H  thiamine (B-1) IV, 250 mg, Intravenous, Daily      Continuous Infusions:   PRN Meds:.  acetaminophen **OR** acetaminophen **OR** acetaminophen    senna-docusate sodium **AND** polyethylene glycol **AND** bisacodyl **AND** bisacodyl    hydrOXYzine    Magnesium Standard Dose Replacement -  Follow Nurse / BPA Driven Protocol    melatonin    nitroglycerin    ondansetron    ondansetron ODT    [COMPLETED] Insert Peripheral IV **AND** sodium chloride    sodium chloride    Assessment & Plan   Assessment & Plan     Active Hospital Problems    Diagnosis  POA    **Failure to thrive in adult [R62.7]  Yes    UTI (urinary tract infection) [N39.0]  Yes    Cognitive decline [R41.89]  Yes    GERD without esophagitis [K21.9]  Yes    Dyslipidemia, goal LDL below 70 [E78.5]  Yes    Essential hypertension [I10]  Yes    Coronary artery disease involving native coronary artery of native heart without angina pectoris [I25.10]  Yes      Resolved Hospital Problems   No resolved problems to display.        Brief Hospital Course to date:  Monique Betts is a 89 y.o. female w CAD s/p PCI, cognitive decline, chronic back pain, chronic urinary incontinence s/p bladder stimulator who presented from PMR office 10/25 w inability to walk.Limited note from Dr Chatman 10/26 - have HPI but not assessment to see exact reason/concern for transport.  Patient also reported to be having some hallucinations    ? EtOH abuse with suspected withdrawal  ? Hypoactive delirium  - Patient having hallucinations of things on her ceiling per nursing  - Discussed with family at length regarding her EtOH consumption, all family members report she only has 1 bourbon drink at night. Patient endorses same amount. EtOH abuse seems to be less likely based on this history.  - No tremors or tachycardia noted on exam  - Discontinued CIWA protocol 10/31/24  - TSH, Vitamin B12, Folate WNL, HIV 1/2 and RPR negative  - Continue multivitamins, thiamine x 3 days, folic acid  - Continue Seroquel regimen    Inability to walk  H/o chronic back pain, L5/S1 radiculopathy  - Patient denies orthostasis, denies worsening back pain or saddle anesthesia   - Ddx broad, challenging w limited history able to obtain: possible acute on chronic L-spine dz (though do not suspect cauda  equina given exam + patient report currently, current 5/5 strength LE, CT spine also reassuring), L-shoulder injury contributing (see below), possible cardiac or pulm sx contributing to weakness, orthostasis  - MRI L-spine 10/30/2024 with degenerative changes of lumbar spine  - She may need to just follow-up with outpatient Ortho for her usual SI joint injections   - Obtain orthostatic vital signs, f/u borderline bradycardia  - Repeat echo shows 56 to 60%, otherwise normal  - PT/OT consulted, recommend inpatient rehabilitation, CM following     Left shoulder pain  - Denies any trauma  - Shoulder x-ray with mild shoulder joint DJD no evidence of any acute process      Heart murmur  - Reviewed 2022 echo, may be from AV sclerosis  - Echo as above     Bradycardia   - Heart rate is currently stable, will continue to monitor  - Hold donepezil if persistent/contributing to sx above     Chronic urinary incontinence s/p bladder stim   - Will need MRI pre-approval/screening     Cognitive impairment   - Continue donepezil  .  CAD s/p PCI   - Continue statin and ASA    Expected Discharge Location and Transportation: Rehab  Expected Discharge   Expected Discharge Date: 11/3/2024; Expected Discharge Time:      VTE Prophylaxis:  Pharmacologic & mechanical VTE prophylaxis orders are present.         AM-PAC 6 Clicks Score (PT): 16 (10/31/24 2000)    CODE STATUS:   Code Status and Medical Interventions: No CPR (Do Not Attempt to Resuscitate); Limited Support; No intubation (DNI)   Ordered at: 10/25/24 2234     Medical Intervention Limits:    No intubation (DNI)     Code Status (Patient has no pulse and is not breathing):    No CPR (Do Not Attempt to Resuscitate)     Medical Interventions (Patient has pulse or is breathing):    Limited Support       Yohana Ma PA-C  11/01/24

## 2024-11-01 NOTE — THERAPY TREATMENT NOTE
Patient Name: Monique Betts  : 1935    MRN: [id number]                              Today's Date: 2024       Admit Date: 10/25/2024    Visit Dx:     ICD-10-CM ICD-9-CM   1. Acute on chronic low back pain  M54.50 724.2    G89.29 338.19     338.29   2. Acute UTI  N39.0 599.0     Patient Active Problem List   Diagnosis    NSTEMI (non-ST elevated myocardial infarction)    COVID-19 virus infection    Coronary artery disease involving native coronary artery of native heart without angina pectoris    Dyslipidemia, goal LDL below 70    Essential hypertension    UTI (urinary tract infection)    Failure to thrive in adult    Cognitive decline    GERD without esophagitis     Past Medical History:   Diagnosis Date    Cancer     Heart murmur     Skin Cancer    Sleep apnea     Not using machine for several years.     Past Surgical History:   Procedure Laterality Date    CARDIAC CATHETERIZATION N/A 2022    Procedure: LEFT HEART CATH;  Surgeon: Antonino Koo MD;  Location: [facility] CATH INVASIVE LOCATION;  Service: Cardiovascular;  Laterality: N/A;      General Information       Row Name 24 1348          Physical Therapy Time and Intention    Document Type therapy note (daily note)  -     Mode of Treatment physical therapy  -       Row Name 24 1348          General Information    Patient Profile Reviewed yes  -ML     Existing Precautions/Restrictions fall;oxygen therapy device and L/min;other (see comments)  L UE/shoulder pain with movement  -ML     Barriers to Rehab medically complex;cognitive status  -       Row Name 24 1348          Cognition    Orientation Status (Cognition) oriented to;person;disoriented to;place;time;situation;verbal cues/prompts needed for orientation  pt. knew she was in a hospital, but not which one, thought it was 2025.  conversational confusion  -       Row Name 24 1348          Safety Issues/Impairments Affecting Functional  Mobility    Safety Issues Affecting Function (Mobility) ability to follow commands;awareness of need for assistance;insight into deficits/self-awareness;judgment;positioning of assistive device;problem-solving;safety precaution awareness;safety precautions follow-through/compliance;sequencing abilities  -ML     Impairments Affecting Function (Mobility) balance;cognition;endurance/activity tolerance;strength;pain;postural/trunk control;shortness of breath;motor control;range of motion (ROM)  -ML     Cognitive Impairments, Mobility Safety/Performance attention;awareness, need for assistance;insight into deficits/self-awareness;judgment;problem-solving/reasoning;safety precaution awareness;safety precaution follow-through;sequencing abilities  -ML               User Key  (r) = Recorded By, (t) = Taken By, (c) = Cosigned By      Initials Name Provider Type    ML Mayra De La Torre Physical Therapist                   Mobility       Row Name 11/01/24 1349          Bed Mobility    Bed Mobility supine-sit;sit-supine  -ML     Supine-Sit [place] (Bed Mobility) minimum assist (75% patient effort);1 person assist;nonverbal cues (demo/gesture);verbal cues  -ML     Sit-Supine [place] (Bed Mobility) minimum assist (75% patient effort);1 person assist;verbal cues  -ML     Assistive Device (Bed Mobility) bed rails;head of bed elevated  -       Row Name 11/01/24 1349          Sit-Stand Transfer    Sit-Stand [place] (Transfers) minimum assist (75% patient effort);verbal cues;1 person assist;nonverbal cues (demo/gesture)  -ML     Assistive Device (Sit-Stand Transfers) walker, front-wheeled  -ML     Comment, (Sit-Stand Transfer) verbal cuues for hand placement prior to sit to stand transfer  -ML       Row Name 11/01/24 1349          Gait/Stairs (Locomotion)    [place] Level (Gait) verbal cues;minimum assist (75% patient effort);2 person assist  -ML     Assistive Device (Gait) walker, front-wheeled  -ML     Distance in  Feet (Gait) 10  + 10  -ML     Deviations/Abnormal Patterns (Gait) bilateral deviations;fausto decreased;gait speed decreased;stride length decreased  -ML     Bilateral Gait Deviations forward flexed posture;heel strike decreased  -ML     Comment, (Gait/Stairs) patient ambulates with step through gait pattern, frequent verbal cues to step into RW  -ML               User Key  (r) = Recorded By, (t) = Taken By, (c) = Cosigned By      Initials Name Provider Type    ML Mayra De La Torre Physical Therapist                   Obj/Interventions       Row Name 11/01/24 1351          Balance    Balance Assessment sitting static balance;sitting dynamic balance;standing static balance;standing dynamic balance  -ML     Static Sitting Balance contact guard  -ML     Dynamic Sitting Balance minimal assist  -ML     Position, Sitting Balance unsupported;sitting edge of bed;other (see comments)  sitting on toilet  -ML     Static Standing Balance minimal assist;1-person assist;verbal cues  -ML     Dynamic Standing Balance verbal cues;minimal assist;2-person assist  -ML     Position/Device Used, Standing Balance supported;walker, front-wheeled  -ML     Balance Interventions sitting;standing;sit to stand;supported;occupation based/functional task  -ML               User Key  (r) = Recorded By, (t) = Taken By, (c) = Cosigned By      Initials Name Provider Type    ML Mayra De La Torre Physical Therapist                   Goals/Plan    No documentation.                  Clinical Impression       Row Name 11/01/24 1352          Pain    Additional Documentation Pain Scale: FACES Pre/Post-Treatment (Group)  -       Row Name 11/01/24 1352          Pain Scale: FACES Pre/Post-Treatment    Pain: FACES Scale, Pretreatment 2-->hurts little bit  -ML     Posttreatment Pain Rating 2-->hurts little bit  -ML       Row Name 11/01/24 1352          Plan of Care Review    Plan of Care Reviewed With patient  -ML     Progress no change  -     Outcome Evaluation  Patient oriented to self and type of place, verbal cues to increase safety awareness during treatment session. Patient presents below baseline for mobility and at increased risk for falls. The patient would continue to benefit from skilled PT to address strenght, balance and activity tolerance deficits. Continue current PT POC.  -ML       Row Name 11/01/24 1352          Therapy Assessment/Plan (PT)    Rehab Potential (PT) fair  -ML       Row Name 11/01/24 1352          Positioning and Restraints    Pre-Treatment Position in bed  -ML     Post Treatment Position bed  -ML     In Bed notified nsg;fowlers;call light within reach;encouraged to call for assist;exit alarm on;side rails up x3  -ML               User Key  (r) = Recorded By, (t) = Taken By, (c) = Cosigned By      Initials Name Provider Type    Mayra Talavera Physical Therapist                   Outcome Measures       Row Name 11/01/24 1354 11/01/24 0855       How much help from another person do you currently need...    Turning from your back to your side while in flat bed without using bedrails? 3  -ML 3  -FAM    Moving from lying on back to sitting on the side of a flat bed without bedrails? 3  -ML 3  -FAM    Moving to and from a bed to a chair (including a wheelchair)? 3  -ML 2  -FAM    Standing up from a chair using your arms (e.g., wheelchair, bedside chair)? 3  -ML 3  -FAM    Climbing 3-5 steps with a railing? 2  -ML 2  -FAM    To walk in hospital room? 2  -ML 2  -FAM    AM-PAC 6 Clicks Score (PT) 16  -ML 15  -FAM    Highest Level of Mobility Goal 5 --> Static standing  -ML 4 --> Transfer to chair/commode  -FAM      Row Name 11/01/24 1354 11/01/24 1208       Functional Assessment    Outcome Measure Options AM-PAC 6 Clicks Basic Mobility (PT)  -ML AM-PAC 6 Clicks Daily Activity (OT)  -JBA              User Key  (r) = Recorded By, (t) = Taken By, (c) = Cosigned By      Initials Name Provider Type    Kerry Chang, OT Occupational Therapist    ANICETO De La Torre  Mayra Physical Therapist    Mary Ann Crisostomo, RN Registered Nurse                                 Physical Therapy Education       Title: PT OT SLP Therapies (In Progress)       Topic: Physical Therapy (In Progress)       Point: Mobility training (In Progress)       Learning Progress Summary            Patient Acceptance, E, NR by ML at 11/1/2024 1355    Acceptance, E,D, VU,NR by LS at 10/27/2024 0913                      Point: Home exercise program (Not Started)       Learner Progress:  Not documented in this visit.              Point: Body mechanics (In Progress)       Learning Progress Summary            Patient Acceptance, E, NR by ML at 11/1/2024 1355    Acceptance, E,D, VU,NR by LS at 10/27/2024 0913                      Point: Precautions (In Progress)       Learning Progress Summary            Patient Acceptance, E, NR by ML at 11/1/2024 1355    Acceptance, E,D, VU,NR by LS at 10/27/2024 0913                                      User Key       Initials Effective Dates Name Provider Type Discipline     02/03/23 -  Joseline Norris, PT Physical Therapist PT     04/22/21 -  Mayra De La Torre Physical Therapist PT                  PT Recommendation and Plan     Progress: no change  Outcome Evaluation: Patient oriented to self and type of place, verbal cues to increase safety awareness during treatment session. Patient presents below baseline for mobility and at increased risk for falls. The patient would continue to benefit from skilled PT to address strenght, balance and activity tolerance deficits. Continue current PT POC.     Time Calculation:         PT Charges       Row Name 11/01/24 1355             Time Calculation    Start Time 1120  -ML      PT Received On 11/01/24  -ML         Timed Charges    66332 - PT Therapeutic Activity Minutes 18  -ML         Total Minutes    Timed Charges Total Minutes 18  -ML       Total Minutes 18  -ML                User Key  (r) = Recorded By, (t) = Taken By, (c) = Cosigned  By      Initials Name Provider Type    Mayra Talavera Physical Therapist                  Therapy Charges for Today       Code Description Service Date Service Provider Modifiers Qty    31882875664 HC PT THERAPEUTIC ACT EA 15 MIN 11/1/2024 Mayra De La Torre GP 1            PT G-Codes  Outcome Measure Options: AM-PAC 6 Clicks Basic Mobility (PT)  AM-PAC 6 Clicks Score (PT): 16  AM-PAC 6 Clicks Score (OT): 13  PT Discharge Summary  Anticipated Discharge Disposition (PT): skilled nursing facility    Mayra De La Torre  11/1/2024

## 2024-11-01 NOTE — PLAN OF CARE
Goal Outcome Evaluation:  Plan of Care Reviewed With: patient        Progress: no change  Outcome Evaluation: Patient was oriented to self and type of place only and often repeating same questions over and over.  Pt. was able to go to toilet and back and participate in grooming, toileting and UE TE, but cues needed for sequencing, safety and maintainting attention to task at times.  Pt. remains appropriate for skilled OT services to address deficit areas and promote return to PLOF. SNF recommended at discharge.    Anticipated Discharge Disposition (OT): skilled nursing facility

## 2024-11-01 NOTE — PLAN OF CARE
Goal Outcome Evaluation:  Plan of Care Reviewed With: patient        Progress: no change  Outcome Evaluation: Patient oriented to self and type of place, verbal cues to increase safety awareness during treatment session. Patient presents below baseline for mobility and at increased risk for falls. The patient would continue to benefit from skilled PT to address strength, balance and activity tolerance deficits. Continue current PT POC.    Anticipated Discharge Disposition (PT): skilled nursing facility

## 2024-11-02 ENCOUNTER — APPOINTMENT (OUTPATIENT)
Dept: GENERAL RADIOLOGY | Facility: HOSPITAL | Age: 89
DRG: 552 | End: 2024-11-02
Payer: MEDICARE

## 2024-11-02 PROCEDURE — 99232 SBSQ HOSP IP/OBS MODERATE 35: CPT

## 2024-11-02 PROCEDURE — 73501 X-RAY EXAM HIP UNI 1 VIEW: CPT

## 2024-11-02 PROCEDURE — 25010000002 HEPARIN (PORCINE) PER 1000 UNITS: Performed by: INTERNAL MEDICINE

## 2024-11-02 PROCEDURE — 25010000002 THIAMINE PER 100 MG

## 2024-11-02 RX ORDER — GABAPENTIN 100 MG/1
100 CAPSULE ORAL NIGHTLY
Status: DISCONTINUED | OUTPATIENT
Start: 2024-11-02 | End: 2024-11-04 | Stop reason: HOSPADM

## 2024-11-02 RX ORDER — LACTULOSE 10 G/15ML
30 SOLUTION ORAL ONCE
Status: COMPLETED | OUTPATIENT
Start: 2024-11-02 | End: 2024-11-02

## 2024-11-02 RX ORDER — BISACODYL 5 MG/1
20 TABLET, DELAYED RELEASE ORAL ONCE
Status: COMPLETED | OUTPATIENT
Start: 2024-11-02 | End: 2024-11-02

## 2024-11-02 RX ADMIN — DICLOFENAC SODIUM 2 G: 10 GEL TOPICAL at 20:16

## 2024-11-02 RX ADMIN — QUETIAPINE FUMARATE 25 MG: 25 TABLET ORAL at 20:16

## 2024-11-02 RX ADMIN — ASPIRIN 81 MG 81 MG: 81 TABLET ORAL at 08:57

## 2024-11-02 RX ADMIN — SENNOSIDES AND DOCUSATE SODIUM 2 TABLET: 50; 8.6 TABLET ORAL at 20:16

## 2024-11-02 RX ADMIN — Medication 10 ML: at 20:17

## 2024-11-02 RX ADMIN — HEPARIN SODIUM 5000 UNITS: 5000 INJECTION INTRAVENOUS; SUBCUTANEOUS at 05:23

## 2024-11-02 RX ADMIN — ROSUVASTATIN 10 MG: 10 TABLET, FILM COATED ORAL at 08:58

## 2024-11-02 RX ADMIN — POLYETHYLENE GLYCOL 3350 17 G: 17 POWDER, FOR SOLUTION ORAL at 15:26

## 2024-11-02 RX ADMIN — THIAMINE HYDROCHLORIDE 250 MG: 100 INJECTION, SOLUTION INTRAMUSCULAR; INTRAVENOUS at 08:58

## 2024-11-02 RX ADMIN — GABAPENTIN 100 MG: 100 CAPSULE ORAL at 20:16

## 2024-11-02 RX ADMIN — LACTULOSE 30 G: 20 SOLUTION ORAL at 16:51

## 2024-11-02 RX ADMIN — LOSARTAN POTASSIUM 25 MG: 25 TABLET, FILM COATED ORAL at 08:58

## 2024-11-02 RX ADMIN — Medication 10 ML: at 08:58

## 2024-11-02 RX ADMIN — PREGABALIN 50 MG: 25 CAPSULE ORAL at 16:51

## 2024-11-02 RX ADMIN — BISACODYL 20 MG: 5 TABLET, COATED ORAL at 16:51

## 2024-11-02 RX ADMIN — DONEPEZIL HYDROCHLORIDE 10 MG: 10 TABLET, FILM COATED ORAL at 08:58

## 2024-11-02 RX ADMIN — HEPARIN SODIUM 5000 UNITS: 5000 INJECTION INTRAVENOUS; SUBCUTANEOUS at 15:17

## 2024-11-02 RX ADMIN — HYDROCODONE BITARTRATE AND ACETAMINOPHEN 1 TABLET: 7.5; 325 TABLET ORAL at 09:01

## 2024-11-02 RX ADMIN — HYDROCODONE BITARTRATE AND ACETAMINOPHEN 1 TABLET: 7.5; 325 TABLET ORAL at 15:18

## 2024-11-02 RX ADMIN — FOLIC ACID 1 MG: 1 TABLET ORAL at 09:29

## 2024-11-02 RX ADMIN — SENNOSIDES AND DOCUSATE SODIUM 2 TABLET: 50; 8.6 TABLET ORAL at 08:58

## 2024-11-02 RX ADMIN — QUETIAPINE FUMARATE 12.5 MG: 25 TABLET ORAL at 08:58

## 2024-11-02 RX ADMIN — HEPARIN SODIUM 5000 UNITS: 5000 INJECTION INTRAVENOUS; SUBCUTANEOUS at 20:16

## 2024-11-02 RX ADMIN — PREGABALIN 50 MG: 25 CAPSULE ORAL at 08:58

## 2024-11-02 RX ADMIN — PREGABALIN 50 MG: 25 CAPSULE ORAL at 20:16

## 2024-11-02 NOTE — PLAN OF CARE
Goal Outcome Evaluation:              Outcome Evaluation: Patient alert x 2-3. Disoriented to time, place, situation. Patient slept well during night.  No hallucinations during night. On room air and sinus on tele. Purewick on with good output to wall suction cannister. Son supportive. Plan is snf and referrals sent to [facility].

## 2024-11-02 NOTE — PLAN OF CARE
Problem: Adult Inpatient Plan of Care  Goal: Plan of Care Review  Outcome: Progressing  Flowsheets (Taken 11/2/2024 1973)  Progress: improving  Outcome Evaluation: Patient is A&Ox4, remains on RA, VSS, NSR on tele. PRN norco given per orders for left shoulder pain. No hallucinations. Left hip x-ray done this shift.  Plan of Care Reviewed With: patient   Goal Outcome Evaluation:  Plan of Care Reviewed With: patient        Progress: improving  Outcome Evaluation: Patient is A&Ox4, remains on RA, VSS, NSR on tele. PRN norco given per orders for left shoulder pain. No hallucinations. Left hip x-ray done this shift.

## 2024-11-02 NOTE — PROGRESS NOTES
[facility] Medicine Services  PROGRESS NOTE    Patient Name: Monique Betts  : 1935  MRN: [id number]    Date of Admission: 10/25/2024  Primary Care Physician: Sena Lomeli MD    Subjective   Subjective     CC: Follow-up weakness, hallucinations    HPI:   Nursing reports patient slept well during the night with no hallucinations. Patient reports she is having left shoulder and left hip pain. She denies recent falls prior to/during her hospitalization. No family at bedside.    Objective   Objective     Vital Signs:   Temp:  [97.8 °F (36.6 °C)-98.9 °F (37.2 °C)] 98.9 °F (37.2 °C)  Heart Rate:  [67-80] 73  Resp:  [18] 18  BP: (110-132)/(56-94) 114/56     Physical Exam:  Constitutional: Chronically ill-appearing elderly female in NAD breathing on 2L NC  HENT: NCAT, mucous membranes moist  Respiratory: Clear to auscultation bilaterally, respiratory effort normal   Cardiovascular: RRR, murmur, rubs, or gallops, no bilateral ankle edema  Gastrointestinal: Positive bowel sounds, soft, nontender, nondistended  Musculoskeletal: Decreased muscle tone throughout, mild tenderness to palpation of left shoulder, tender to palpation of left hip with reduced ROM  Psychiatric: Appropriate affect, cooperative  Neurologic: Alert, oriented x 3    Results Reviewed:  LAB RESULTS:      Lab 10/31/24  0457   WBC 6.34   HEMOGLOBIN 12.7   HEMATOCRIT 38.0   PLATELETS 149   MCV 96.4         Lab 10/31/24  1255 10/31/24  0457 10/28/24  1319   SODIUM  --  141 140   POTASSIUM  --  3.9 4.0   CHLORIDE  --  105 102   CO2  --  25.0 27.0   ANION GAP  --  11.0 11.0   BUN  --  22 17   CREATININE  --  0.93 0.82   EGFR  --  59.2* 68.9   GLUCOSE  --  112* 126*   CALCIUM  --  9.2 9.7   TSH 2.880  --   --                        Lab 10/31/24  1255   FOLATE >20.00   VITAMIN B 12 864         Brief Urine Lab Results  (Last result in the past 365 days)        Color   Clarity   Blood   Leuk Est   Nitrite   Protein   CREAT    Urine HCG        10/25/24 1645 Yellow   Clear   Trace   Moderate (2+)   Negative   Trace                   Microbiology Results Abnormal       None            No radiology results from the last 24 hrs    Results for orders placed during the hospital encounter of 10/25/24    Adult Transthoracic Echo Complete W/ Cont if Necessary Per Protocol    Interpretation Summary    Left ventricular ejection fraction appears to be 56 - 60%.    Estimated right ventricular systolic pressure from tricuspid regurgitation is normal (<35 mmHg).    No significant valvular disease      Current medications:  Scheduled Meds:aspirin, 81 mg, Oral, Daily  donepezil, 10 mg, Oral, Daily  folic acid, 1 mg, Oral, Daily  heparin (porcine), 5,000 Units, Subcutaneous, Q8H  losartan, 25 mg, Oral, Q24H  pregabalin, 50 mg, Oral, TID  QUEtiapine, 12.5 mg, Oral, Daily  QUEtiapine, 25 mg, Oral, Nightly  rosuvastatin, 10 mg, Oral, Daily  senna-docusate sodium, 2 tablet, Oral, BID  sodium chloride, 10 mL, Intravenous, Q12H  thiamine (B-1) IV, 250 mg, Intravenous, Daily      Continuous Infusions:   PRN Meds:.  acetaminophen **OR** acetaminophen **OR** acetaminophen    senna-docusate sodium **AND** polyethylene glycol **AND** bisacodyl **AND** bisacodyl    HYDROcodone-acetaminophen    hydrOXYzine    Magnesium Standard Dose Replacement - Follow Nurse / BPA Driven Protocol    melatonin    nitroglycerin    ondansetron    ondansetron ODT    [COMPLETED] Insert Peripheral IV **AND** sodium chloride    sodium chloride    Assessment & Plan   Assessment & Plan     Active Hospital Problems    Diagnosis  POA    **Failure to thrive in adult [R62.7]  Yes    UTI (urinary tract infection) [N39.0]  Yes    Cognitive decline [R41.89]  Yes    GERD without esophagitis [K21.9]  Yes    Dyslipidemia, goal LDL below 70 [E78.5]  Yes    Essential hypertension [I10]  Yes    Coronary artery disease involving native coronary artery of native heart without angina pectoris [I25.10]  Yes       Resolved Hospital Problems   No resolved problems to display.        Brief Hospital Course to date:  Monique Betts is a 89 y.o. female w CAD s/p PCI, cognitive decline, chronic back pain, chronic urinary incontinence s/p bladder stimulator who presented from PMR office 10/25 w inability to walk.Limited note from Dr Chatman 10/26 - have HPI but not assessment to see exact reason/concern for transport.  Patient also reported to be having some hallucinations, have seemed to resolve.    ? Hypoactive delirium, improved  ? EtOH abuse with suspected withdrawal, less likely  - Patient without hallucinations 11/1-11/2  - Discussed with family at length regarding her EtOH consumption on 11/1/2024, all family members report she only has 1 bourbon drink at night. Patient endorses same amount. EtOH abuse seems to be less likely based on this history.  - No tremors or tachycardia noted on exam  - Discontinued CIWA protocol 10/31/24  - TSH, Vitamin B12, Folate WNL  - HIV 1/2 and RPR negative  - Continue multivitamins, thiamine x 3 days, folic acid  - Discontinue AM Seroquel, continue nighttime dose    Inability to walk  H/o chronic back pain, L5/S1 radiculopathy  Left hip pain  - Patient denies orthostasis, denies worsening back pain or saddle anesthesia   - Ddx broad, challenging w limited history able to obtain: possible acute on chronic L-spine dz (though do not suspect cauda equina given exam + patient report currently, current 5/5 strength LE, CT spine also reassuring), L-shoulder injury contributing (see below), possible cardiac or pulm sx contributing to weakness, orthostasis  - MRI L-spine 10/30/2024 with degenerative changes of lumbar spine  - Patient's family reports the injections she was receiving from Ortho were no longer helping, patient was in the process of trying to find another physician prior to her admission  - Obtain orthostatic vital signs, f/u borderline bradycardia  - Repeat echo shows 56 to 60%,  otherwise normal  - PT/OT consulted, recommend inpatient rehabilitation, CM following  - Patient reported left hip pain to me today, 11/2/2024, left hip XR ordered with as needed voltaren gel     Left shoulder pain  - Denies any trauma  - Shoulder x-ray with mild shoulder joint DJD no evidence of any acute process      Heart murmur  - Reviewed 2022 echo, may be from AV sclerosis  - Echo as above     Bradycardia   - Heart rate is currently stable, will continue to monitor  - Hold donepezil if persistent/contributing to sx above     Chronic urinary incontinence s/p bladder stim   - Will need MRI pre-approval/screening     Cognitive impairment   - Continue donepezil  .  CAD s/p PCI   - Continue statin and ASA    Expected Discharge Location and Transportation: Rehab  Expected Discharge   Expected Discharge Date: 11/4/2024; Expected Discharge Time:      VTE Prophylaxis:  Pharmacologic & mechanical VTE prophylaxis orders are present.         AM-PAC 6 Clicks Score (PT): 16 (11/01/24 2000)    CODE STATUS:   Code Status and Medical Interventions: No CPR (Do Not Attempt to Resuscitate); Limited Support; No intubation (DNI)   Ordered at: 10/25/24 2234     Medical Intervention Limits:    No intubation (DNI)     Code Status (Patient has no pulse and is not breathing):    No CPR (Do Not Attempt to Resuscitate)     Medical Interventions (Patient has pulse or is breathing):    Limited Support       Yohana Ma PA-C  11/02/24

## 2024-11-03 PROCEDURE — 25010000002 HEPARIN (PORCINE) PER 1000 UNITS: Performed by: INTERNAL MEDICINE

## 2024-11-03 PROCEDURE — 97530 THERAPEUTIC ACTIVITIES: CPT

## 2024-11-03 PROCEDURE — 99232 SBSQ HOSP IP/OBS MODERATE 35: CPT | Performed by: INTERNAL MEDICINE

## 2024-11-03 PROCEDURE — 25010000002 THIAMINE PER 100 MG

## 2024-11-03 PROCEDURE — 97110 THERAPEUTIC EXERCISES: CPT

## 2024-11-03 RX ORDER — LIDOCAINE 4 G/G
1 PATCH TOPICAL
Status: DISCONTINUED | OUTPATIENT
Start: 2024-11-03 | End: 2024-11-04 | Stop reason: HOSPADM

## 2024-11-03 RX ADMIN — PREGABALIN 50 MG: 25 CAPSULE ORAL at 09:13

## 2024-11-03 RX ADMIN — FOLIC ACID 1 MG: 1 TABLET ORAL at 09:13

## 2024-11-03 RX ADMIN — HEPARIN SODIUM 5000 UNITS: 5000 INJECTION INTRAVENOUS; SUBCUTANEOUS at 14:19

## 2024-11-03 RX ADMIN — QUETIAPINE FUMARATE 25 MG: 25 TABLET ORAL at 20:48

## 2024-11-03 RX ADMIN — PREGABALIN 50 MG: 25 CAPSULE ORAL at 20:48

## 2024-11-03 RX ADMIN — THIAMINE HYDROCHLORIDE 250 MG: 100 INJECTION, SOLUTION INTRAMUSCULAR; INTRAVENOUS at 09:13

## 2024-11-03 RX ADMIN — Medication 10 ML: at 09:13

## 2024-11-03 RX ADMIN — HEPARIN SODIUM 5000 UNITS: 5000 INJECTION INTRAVENOUS; SUBCUTANEOUS at 06:35

## 2024-11-03 RX ADMIN — ASPIRIN 81 MG 81 MG: 81 TABLET ORAL at 09:13

## 2024-11-03 RX ADMIN — HYDROCODONE BITARTRATE AND ACETAMINOPHEN 1 TABLET: 7.5; 325 TABLET ORAL at 14:39

## 2024-11-03 RX ADMIN — LOSARTAN POTASSIUM 25 MG: 25 TABLET, FILM COATED ORAL at 09:13

## 2024-11-03 RX ADMIN — HYDROCODONE BITARTRATE AND ACETAMINOPHEN 1 TABLET: 7.5; 325 TABLET ORAL at 09:22

## 2024-11-03 RX ADMIN — Medication 10 MG: at 20:48

## 2024-11-03 RX ADMIN — GABAPENTIN 100 MG: 100 CAPSULE ORAL at 20:48

## 2024-11-03 RX ADMIN — ROSUVASTATIN 10 MG: 10 TABLET, FILM COATED ORAL at 09:13

## 2024-11-03 RX ADMIN — LIDOCAINE 1 PATCH: 4 PATCH TOPICAL at 16:18

## 2024-11-03 RX ADMIN — DICLOFENAC SODIUM 2 G: 10 GEL TOPICAL at 09:12

## 2024-11-03 RX ADMIN — PREGABALIN 50 MG: 25 CAPSULE ORAL at 16:19

## 2024-11-03 RX ADMIN — HEPARIN SODIUM 5000 UNITS: 5000 INJECTION INTRAVENOUS; SUBCUTANEOUS at 20:48

## 2024-11-03 RX ADMIN — DONEPEZIL HYDROCHLORIDE 10 MG: 10 TABLET, FILM COATED ORAL at 09:13

## 2024-11-03 RX ADMIN — Medication 10 ML: at 20:48

## 2024-11-03 NOTE — PLAN OF CARE
Goal Outcome Evaluation:              Outcome Evaluation: Patient alert x 4 during night with forgetfulness noted Patient slept well during night.  No hallucinations during night. On room air and sinus on tele. Only c/o was tenderness in left shoulder and down arm, voltaren gel applied. Son supportive. Plan is snf and referrals sent to [facility].

## 2024-11-03 NOTE — THERAPY TREATMENT NOTE
Patient Name: Monique Betts  : 1935    MRN: [id number]                              Today's Date: 11/3/2024       Admit Date: 10/25/2024    Visit Dx:     ICD-10-CM ICD-9-CM   1. Acute on chronic low back pain  M54.50 724.2    G89.29 338.19     338.29   2. Acute UTI  N39.0 599.0     Patient Active Problem List   Diagnosis    NSTEMI (non-ST elevated myocardial infarction)    COVID-19 virus infection    Coronary artery disease involving native coronary artery of native heart without angina pectoris    Dyslipidemia, goal LDL below 70    Essential hypertension    UTI (urinary tract infection)    Failure to thrive in adult    Cognitive decline    GERD without esophagitis     Past Medical History:   Diagnosis Date    Cancer     Heart murmur     Skin Cancer    Sleep apnea     Not using machine for several years.     Past Surgical History:   Procedure Laterality Date    CARDIAC CATHETERIZATION N/A 2022    Procedure: LEFT HEART CATH;  Surgeon: Antonino Koo MD;  Location: [facility] CATH INVASIVE LOCATION;  Service: Cardiovascular;  Laterality: N/A;      General Information       Row Name 24 1540          Physical Therapy Time and Intention    Document Type therapy note (daily note)  -ML     Mode of Treatment physical therapy  -ML       Row Name 24 1540          General Information    Patient Profile Reviewed yes  -ML     Existing Precautions/Restrictions fall  L UE/shoulder pain with movement  -ML     Barriers to Rehab medically complex  -ML       Row Name 24 1540          Cognition    Orientation Status (Cognition) oriented x 3  -ML       Row Name 24 1540          Safety Issues/Impairments Affecting Functional Mobility    Safety Issues Affecting Function (Mobility) awareness of need for assistance;insight into deficits/self-awareness;safety precaution awareness;safety precautions follow-through/compliance;sequencing abilities  -ML     Impairments Affecting Function (Mobility)  balance;endurance/activity tolerance;strength;pain;range of motion (ROM)  -ML               User Key  (r) = Recorded By, (t) = Taken By, (c) = Cosigned By      Initials Name Provider Type    Mayra Talavera Physical Therapist                   Mobility       Row Name 11/03/24 1541          Bed Mobility    Bed Mobility sit-supine  -ML     Sit-Supine Basom (Bed Mobility) standby assist  -ML     Assistive Device (Bed Mobility) head of bed elevated  -ML       Row Name 11/03/24 1541          Bed-Chair Transfer    Bed-Chair Basom (Transfers) verbal cues;minimum assist (75% patient effort);1 person assist  -ML     Assistive Device (Bed-Chair Transfers) other (see comments)  RUE Hand held assist  -ML       Row Name 11/03/24 1541          Sit-Stand Transfer    Sit-Stand Basom (Transfers) verbal cues;minimum assist (75% patient effort);1 person assist  -ML     Assistive Device (Sit-Stand Transfers) walker, front-wheeled  -ML       Row Name 11/03/24 1541          Gait/Stairs (Locomotion)    Basom Level (Gait) verbal cues;contact guard  -ML     Assistive Device (Gait) walker, front-wheeled  -ML     Distance in Feet (Gait) 55  -ML     Deviations/Abnormal Patterns (Gait) bilateral deviations;fausto decreased;gait speed decreased;stride length decreased  -ML     Bilateral Gait Deviations forward flexed posture;heel strike decreased  -ML     Comment, (Gait/Stairs) patient ambulates with step through gait pattern, unsteady gait throughout, no loss of balance  -ML               User Key  (r) = Recorded By, (t) = Taken By, (c) = Cosigned By      Initials Name Provider Type    ML Mayra De La Torre Physical Therapist                   Obj/Interventions       Row Name 11/03/24 1543          Motor Skills    Therapeutic Exercise hip;knee;shoulder  -ML       Row Name 11/03/24 1543          Shoulder (Therapeutic Exercise)    Shoulder (Therapeutic Exercise) AROM (active range of motion)  -ML     Shoulder AROM  (Therapeutic Exercise) bilateral;scapular retraction;10 repetitions  -ML       Row Name 11/03/24 1543          Hip (Therapeutic Exercise)    Hip (Therapeutic Exercise) strengthening exercise  -ML     Hip Strengthening (Therapeutic Exercise) bilateral;marching while seated;10 repetitions  -ML       Row Name 11/03/24 1543          Knee (Therapeutic Exercise)    Knee (Therapeutic Exercise) strengthening exercise  -ML     Knee Strengthening (Therapeutic Exercise) bilateral;LAQ (long arc quad);15 repititions  -ML       Row Name 11/03/24 1543          Balance    Balance Assessment sitting static balance;sitting dynamic balance;standing static balance;standing dynamic balance  -ML     Static Sitting Balance standby assist  -ML     Dynamic Sitting Balance standby assist  -ML     Position, Sitting Balance unsupported;sitting in chair;sitting edge of bed  -ML     Static Standing Balance contact guard  -ML     Dynamic Standing Balance contact guard;verbal cues  -ML     Position/Device Used, Standing Balance supported;walker, front-wheeled  -ML     Balance Interventions sitting;standing;sit to stand;supported;occupation based/functional task  -ML               User Key  (r) = Recorded By, (t) = Taken By, (c) = Cosigned By      Initials Name Provider Type    ML Mayra De La Torre Physical Therapist                   Goals/Plan    No documentation.                  Clinical Impression       Row Name 11/03/24 1544          Pain    Pretreatment Pain Rating 6/10  -ML     Posttreatment Pain Rating 6/10  -ML     Pain Location extremity  -ML     Pain Side/Orientation left;upper  -ML     Pain Management Interventions exercise or physical activity utilized;positioning techniques utilized;nursing notified;heat applied  -ML     Response to Pain Interventions activity participation with tolerable pain;no change per patient report  -ML       Row Name 11/03/24 1544          Plan of Care Review    Plan of Care Reviewed With patient  -ML     Progress  improving  -ML     Outcome Evaluation Physical therapy treatment complete. The patient with improvement in cognition and command following compared to previous treatment session. Patient increased ambulation distance compared to previous treatment session, however, required use of RW and demonstrates unsteady gait. Patient continues to present below baseline for mobility and at increased risk for falls. Patient would continue to benefit from skilled PT to address strength, balance and activity tolerance deficits. Continue current PT POC.  -       Row Name 11/03/24 1542          Therapy Assessment/Plan (PT)    Rehab Potential (PT) good  -ML     Criteria for Skilled Interventions Met (PT) yes;skilled treatment is necessary  -ML     Therapy Frequency (PT) daily  -ML       Row Name 11/03/24 1548          Positioning and Restraints    Pre-Treatment Position sitting in chair/recliner  -ML     Post Treatment Position bed  -ML     In Bed notified nsg;fowlers;call light within reach;encouraged to call for assist;exit alarm on;side rails up x2;RUE elevated;LUE elevated;heels elevated  -               User Key  (r) = Recorded By, (t) = Taken By, (c) = Cosigned By      Initials Name Provider Type     Mayra eD La Torre Physical Therapist                   Outcome Measures       Row Name 11/03/24 9076          How much help from another person do you currently need...    Turning from your back to your side while in flat bed without using bedrails? 4  -ML     Moving from lying on back to sitting on the side of a flat bed without bedrails? 3  -ML     Moving to and from a bed to a chair (including a wheelchair)? 3  -ML     Standing up from a chair using your arms (e.g., wheelchair, bedside chair)? 3  -ML     Climbing 3-5 steps with a railing? 3  -ML     To walk in hospital room? 3  -ML     AM-PAC 6 Clicks Score (PT) 19  -ML     Highest Level of Mobility Goal 6 --> Walk 10 steps or more  -       Row Name 11/03/24 3579           Functional Assessment    Outcome Measure Options AM-PAC 6 Clicks Basic Mobility (PT)  -ML               User Key  (r) = Recorded By, (t) = Taken By, (c) = Cosigned By      Initials Name Provider Type     Mayra De La Torre Physical Therapist                                 Physical Therapy Education       Title: PT OT SLP Therapies (In Progress)       Topic: Physical Therapy (In Progress)       Point: Mobility training (Done)       Learning Progress Summary            Patient Acceptance, E, VU,NR by  at 11/3/2024 1548    Acceptance, E, NR by  at 11/1/2024 1355    Acceptance, E,D, VU,NR by  at 10/27/2024 0913                      Point: Home exercise program (Done)       Learning Progress Summary            Patient Acceptance, E, VU,NR by  at 11/3/2024 1548                      Point: Body mechanics (In Progress)       Learning Progress Summary            Patient Acceptance, E, NR by ML at 11/1/2024 1355    Acceptance, E,D, VU,NR by  at 10/27/2024 0913                      Point: Precautions (Done)       Learning Progress Summary            Patient Acceptance, E, VU,NR by  at 11/3/2024 1548    Acceptance, E, NR by ML at 11/1/2024 1355    Acceptance, E,D, VU,NR by  at 10/27/2024 0913                                      User Key       Initials Effective Dates Name Provider Type Discipline     02/03/23 -  Joseline Norris, PT Physical Therapist PT     04/22/21 -  Mayra De La Torre Physical Therapist PT                  PT Recommendation and Plan     Progress: improving  Outcome Evaluation: Physical therapy treatment complete. The patient with improvement in cognition and command following compared to previous treatment session. Patient increased ambulation distance compared to previous treatment session, however, required use of RW and demonstrates unsteady gait. Patient continues to present below baseline for mobility and at increased risk for falls. Patient would continue to benefit from skilled PT to address  strength, balance and activity tolerance deficits. Continue current PT POC.     Time Calculation:         PT Charges       Row Name 11/03/24 1549             Time Calculation    Start Time 1510  -ML      PT Received On 11/03/24  -ML         Timed Charges    33341 - PT Therapeutic Exercise Minutes 8  -ML      68458 - PT Therapeutic Activity Minutes 16  -ML         Total Minutes    Timed Charges Total Minutes 24  -ML       Total Minutes 24  -ML                User Key  (r) = Recorded By, (t) = Taken By, (c) = Cosigned By      Initials Name Provider Type    Mayra Talavera Physical Therapist                  Therapy Charges for Today       Code Description Service Date Service Provider Modifiers Qty    68708156470 HC PT THER PROC EA 15 MIN 11/3/2024 Mayra De La Torre GP 1    33161191683 HC PT THERAPEUTIC ACT EA 15 MIN 11/3/2024 Mayra De La Torre GP 1            PT G-Codes  Outcome Measure Options: AM-PAC 6 Clicks Basic Mobility (PT)  AM-PAC 6 Clicks Score (PT): 19  AM-PAC 6 Clicks Score (OT): 13  PT Discharge Summary  Anticipated Discharge Disposition (PT): skilled nursing facility    Mayra De La Torre  11/3/2024

## 2024-11-03 NOTE — PROGRESS NOTES
[facility] Medicine Services  PROGRESS NOTE    Patient Name: Monique Betts  : 1935  MRN: [id number]    Date of Admission: 10/25/2024  Primary Care Physician: Sena Lomeli MD    Subjective   Subjective     CC:   Follow-up delirium    HPI:   Patient seen and examined this morning.  Comfortable in the recliner bedside.  Still feeling very weak.  She understand that she cannot handle her ADLs the way she has right now and that she needs rehab.  No further hallucination .    Objective   Objective     Vital Signs:   Temp:  [97.9 °F (36.6 °C)-98.5 °F (36.9 °C)] 98 °F (36.7 °C)  Heart Rate:  [64-72] 68  Resp:  [18] 18  BP: (117-134)/(51-60) 134/51     Physical Exam:  General: Comfortable, not in distress, conversant and cooperative  Head: Atraumatic and normocephalic  Eyes: No Icterus. No pallor  Ears:  Ears appear intact with no abnormalities noted  Throat: No oral lesions, no thrush  Neck: Supple, trachea midline  Lungs: Clear to auscultation bilaterally, equal air entry, no wheezing or crackles  Heart:  Normal S1 and S2, no murmur, no gallop, No JVD, no lower extremity swelling  Abdomen:  Soft, no tenderness, no organomegaly, normal bowel sounds, no organomegaly  Extremities: pulses equal bilaterally  Skin: No bleeding, bruising or rash, normal skin turgor and elasticity  Neurologic: Cranial nerves appear intact with no evidence of facial asymmetry, normal motor and sensory functions in all 4 extremities  Psych: Alert and oriented x 3, normal mood      Results Reviewed:  LAB RESULTS:      Lab 10/31/24  0457   WBC 6.34   HEMOGLOBIN 12.7   HEMATOCRIT 38.0   PLATELETS 149   MCV 96.4         Lab 10/31/24  1255 10/31/24  0457 10/28/24  1319   SODIUM  --  141 140   POTASSIUM  --  3.9 4.0   CHLORIDE  --  105 102   CO2  --  25.0 27.0   ANION GAP  --  11.0 11.0   BUN  --  22 17   CREATININE  --  0.93 0.82   EGFR  --  59.2* 68.9   GLUCOSE  --  112* 126*   CALCIUM  --  9.2 9.7   TSH 2.880   --   --                        Lab 10/31/24  1255   FOLATE >20.00   VITAMIN B 12 864         Brief Urine Lab Results  (Last result in the past 365 days)        Color   Clarity   Blood   Leuk Est   Nitrite   Protein   CREAT   Urine HCG        10/25/24 1645 Yellow   Clear   Trace   Moderate (2+)   Negative   Trace                   Microbiology Results Abnormal       None            XR Hip With or Without Pelvis 1 View Left    Result Date: 11/2/2024  XR HIP W OR WO PELVIS 1 VIEW LEFT Date of Exam: 11/2/2024 12:20 PM EDT Indication: left hip pain Comparison: CT 4/23/2023. Findings: Cortical margins appear intact, without evidence of acute fracture or traumatic malalignment. Moderate arthrosis changes are present, with narrowing, sclerosis and small osteophytes.     Impression: Impression: Moderate left hip arthrosis without evidence of acute fracture or traumatic malalignment. Electronically Signed: Jules Carrillo MD  11/2/2024 4:43 PM EDT  Workstation ID: [id number]     Results for orders placed during the hospital encounter of 10/25/24    Adult Transthoracic Echo Complete W/ Cont if Necessary Per Protocol    Interpretation Summary    Left ventricular ejection fraction appears to be 56 - 60%.    Estimated right ventricular systolic pressure from tricuspid regurgitation is normal (<35 mmHg).    No significant valvular disease      Current medications:  Scheduled Meds:aspirin, 81 mg, Oral, Daily  donepezil, 10 mg, Oral, Daily  folic acid, 1 mg, Oral, Daily  gabapentin, 100 mg, Oral, Nightly  heparin (porcine), 5,000 Units, Subcutaneous, Q8H  losartan, 25 mg, Oral, Q24H  pregabalin, 50 mg, Oral, TID  QUEtiapine, 25 mg, Oral, Nightly  rosuvastatin, 10 mg, Oral, Daily  senna-docusate sodium, 2 tablet, Oral, BID  sodium chloride, 10 mL, Intravenous, Q12H  thiamine (B-1) IV, 250 mg, Intravenous, Daily      Continuous Infusions:   PRN Meds:.  acetaminophen **OR** acetaminophen **OR** acetaminophen    senna-docusate sodium  **AND** polyethylene glycol **AND** bisacodyl **AND** bisacodyl    Diclofenac Sodium    HYDROcodone-acetaminophen    hydrOXYzine    Magnesium Standard Dose Replacement - Follow Nurse / BPA Driven Protocol    melatonin    nitroglycerin    ondansetron    ondansetron ODT    [COMPLETED] Insert Peripheral IV **AND** sodium chloride    sodium chloride    Assessment & Plan   Assessment & Plan     Active Hospital Problems    Diagnosis  POA    **Failure to thrive in adult [R62.7]  Yes    UTI (urinary tract infection) [N39.0]  Yes    Cognitive decline [R41.89]  Yes    GERD without esophagitis [K21.9]  Yes    Dyslipidemia, goal LDL below 70 [E78.5]  Yes    Essential hypertension [I10]  Yes    Coronary artery disease involving native coronary artery of native heart without angina pectoris [I25.10]  Yes      Resolved Hospital Problems   No resolved problems to display.        Brief Hospital Course to date:  Monique Betts is a 89 y.o. female w CAD s/p PCI, cognitive decline, chronic back pain, chronic urinary incontinence s/p bladder stimulator who presented from PMR office 10/25 w inability to walk and acute delirium with hallucination secondary to sleep deprivation      Hypoactive delirium, resolved  Sleep deprivation, improved  Alcohol withdrawal, ruled out  Patient presented with acute delirium and hallucination.  Initially thought to be secondary to alcohol withdrawal.  She drinks only 1 drink per night and never had any history of alcohol drawl before.  I strongly think that she suffered hypoactive delirium from sleep deprivation secondary to her worsening chronic arthritis pain of her shoulders and hips  Hypoactive delirium completely resolved after discontinuing her benzodiazepines ( CIWA protocol) and adding Seroquel to regulate her sleep cycle.  Now she is fully awake and alert.  No further hallucinations  TSH, Vitamin B12, Folate WNL  HIV 1/2 and RPR negative  Status post high-dose thiamine x 3 days  Continue  Seroquel 25 mg at bedtime    Acute debility  H/o chronic back pain, L5/S1 radiculopathy  Left hip and left shoulder arthritis with pain  Patient denies orthostasis, denies worsening back pain or saddle anesthesia   MRI L-spine 10/30/2024 with degenerative changes of lumbar spine  Left hip pain and left shoulder pain likely secondary to arthritis.  X-ray with evidence of severe arthritis without any acute fractures   PT/OT consulted, recommend inpatient rehabilitation, CM following      Chronic urinary incontinence s/p bladder stim   Cognitive impairment   Continue donepezil  .  CAD s/p PCI   Continue statin and ASA    Expected Discharge Location and Transportation: Rehab  Expected Discharge   Expected Discharge Date: 11/4/2024; Expected Discharge Time:      VTE Prophylaxis:  Pharmacologic & mechanical VTE prophylaxis orders are present.         AM-PAC 6 Clicks Score (PT): 19 (11/02/24 2000)    CODE STATUS:   Code Status and Medical Interventions: No CPR (Do Not Attempt to Resuscitate); Limited Support; No intubation (DNI)   Ordered at: 10/25/24 2234     Medical Intervention Limits:    No intubation (DNI)     Code Status (Patient has no pulse and is not breathing):    No CPR (Do Not Attempt to Resuscitate)     Medical Interventions (Patient has pulse or is breathing):    Limited Support     Copied text in this note has been reviewed and is accurate as of 11/03/24.     Angelina Mixon MD  11/03/24

## 2024-11-03 NOTE — PLAN OF CARE
Goal Outcome Evaluation:  Plan of Care Reviewed With: patient        Progress: improving  Outcome Evaluation: Physical therapy treatment complete. The patient with improvement in cognition and command following compared to previous treatment session. Patient increased ambulation distance compared to previous treatment session, however, required use of RW and demonstrates unsteady gait. Patient continues to present below baseline for mobility and at increased risk for falls. Patient would continue to benefit from skilled PT to address strength, balance and activity tolerance deficits. Continue current PT POC.    Anticipated Discharge Disposition (PT): skilled nursing facility

## 2024-11-03 NOTE — PLAN OF CARE
Problem: Adult Inpatient Plan of Care  Goal: Plan of Care Review  Outcome: Progressing   Goal Outcome Evaluation:                                          Patient A&Ox4, up to the chair, worked with PT, complaing of left shoulder pain, receiving voltaren, prn medication, lidocaine patch and k pad added today.

## 2024-11-04 VITALS
HEIGHT: 64 IN | RESPIRATION RATE: 18 BRPM | SYSTOLIC BLOOD PRESSURE: 134 MMHG | OXYGEN SATURATION: 92 % | TEMPERATURE: 98.5 F | BODY MASS INDEX: 25.95 KG/M2 | WEIGHT: 152 LBS | HEART RATE: 71 BPM | DIASTOLIC BLOOD PRESSURE: 52 MMHG

## 2024-11-04 PROBLEM — N39.0 UTI (URINARY TRACT INFECTION): Status: RESOLVED | Noted: 2024-10-25 | Resolved: 2024-11-04

## 2024-11-04 LAB
DEPRECATED RDW RBC AUTO: 43.9 FL (ref 37–54)
ERYTHROCYTE [DISTWIDTH] IN BLOOD BY AUTOMATED COUNT: 12.1 % (ref 12.3–15.4)
HCT VFR BLD AUTO: 38.4 % (ref 34–46.6)
HGB BLD-MCNC: 12.4 G/DL (ref 12–15.9)
MCH RBC QN AUTO: 32 PG (ref 26.6–33)
MCHC RBC AUTO-ENTMCNC: 32.3 G/DL (ref 31.5–35.7)
MCV RBC AUTO: 99.2 FL (ref 79–97)
PLATELET # BLD AUTO: 162 10*3/MM3 (ref 140–450)
PMV BLD AUTO: 12.4 FL (ref 6–12)
RBC # BLD AUTO: 3.87 10*6/MM3 (ref 3.77–5.28)
WBC NRBC COR # BLD AUTO: 5.32 10*3/MM3 (ref 3.4–10.8)

## 2024-11-04 PROCEDURE — 97530 THERAPEUTIC ACTIVITIES: CPT

## 2024-11-04 PROCEDURE — 25010000002 HEPARIN (PORCINE) PER 1000 UNITS: Performed by: INTERNAL MEDICINE

## 2024-11-04 PROCEDURE — 99239 HOSP IP/OBS DSCHRG MGMT >30: CPT | Performed by: FAMILY MEDICINE

## 2024-11-04 PROCEDURE — 85027 COMPLETE CBC AUTOMATED: CPT

## 2024-11-04 RX ORDER — QUETIAPINE FUMARATE 25 MG/1
25 TABLET, FILM COATED ORAL NIGHTLY
Qty: 30 TABLET | Refills: 0 | Status: SHIPPED | OUTPATIENT
Start: 2024-11-04

## 2024-11-04 RX ORDER — LOSARTAN POTASSIUM 25 MG/1
25 TABLET ORAL
Qty: 30 TABLET | Refills: 0 | Status: SHIPPED | OUTPATIENT
Start: 2024-11-05

## 2024-11-04 RX ADMIN — PREGABALIN 50 MG: 25 CAPSULE ORAL at 09:17

## 2024-11-04 RX ADMIN — SENNOSIDES AND DOCUSATE SODIUM 2 TABLET: 50; 8.6 TABLET ORAL at 09:17

## 2024-11-04 RX ADMIN — ASPIRIN 81 MG 81 MG: 81 TABLET ORAL at 09:17

## 2024-11-04 RX ADMIN — HEPARIN SODIUM 5000 UNITS: 5000 INJECTION INTRAVENOUS; SUBCUTANEOUS at 05:22

## 2024-11-04 RX ADMIN — LIDOCAINE 1 PATCH: 4 PATCH TOPICAL at 09:17

## 2024-11-04 RX ADMIN — HYDROCODONE BITARTRATE AND ACETAMINOPHEN 1 TABLET: 7.5; 325 TABLET ORAL at 09:17

## 2024-11-04 RX ADMIN — ROSUVASTATIN 10 MG: 10 TABLET, FILM COATED ORAL at 09:17

## 2024-11-04 RX ADMIN — DICLOFENAC SODIUM 2 G: 10 GEL TOPICAL at 12:50

## 2024-11-04 RX ADMIN — Medication 10 ML: at 09:17

## 2024-11-04 RX ADMIN — DICLOFENAC SODIUM 2 G: 10 GEL TOPICAL at 09:18

## 2024-11-04 RX ADMIN — DONEPEZIL HYDROCHLORIDE 10 MG: 10 TABLET, FILM COATED ORAL at 09:17

## 2024-11-04 RX ADMIN — FOLIC ACID 1 MG: 1 TABLET ORAL at 09:17

## 2024-11-04 RX ADMIN — LOSARTAN POTASSIUM 25 MG: 25 TABLET, FILM COATED ORAL at 09:17

## 2024-11-04 NOTE — PLAN OF CARE
Goal Outcome Evaluation:  Plan of Care Reviewed With: patient        Progress: improving  Outcome Evaluation: Patient remains A&Ox4. NSR on tele. Remains on room air. Skin interventions in place. Patient rested well overnight. Continue plan of care.

## 2024-11-04 NOTE — DISCHARGE SUMMARY
[facility] Medicine Services  DISCHARGE SUMMARY    Patient Name: Monique Betts  : 1935  MRN: [id number]    Date of Admission: 10/25/2024  5:40 PM  Date of Discharge:  2024  Primary Care Physician: Sena Lomeli MD    Consults       No orders found from 2024 to 10/26/2024.            Hospital Course     Presenting Problem: Generalized weakness, back pain     Active Hospital Problems    Diagnosis  POA    **Failure to thrive in adult [R62.7]  Yes    Cognitive decline [R41.89]  Yes    GERD without esophagitis [K21.9]  Yes    Dyslipidemia, goal LDL below 70 [E78.5]  Yes    Essential hypertension [I10]  Yes    Coronary artery disease involving native coronary artery of native heart without angina pectoris [I25.10]  Yes      Resolved Hospital Problems    Diagnosis Date Resolved POA    UTI (urinary tract infection) [N39.0] 2024 Yes          Hospital Course:  Monique Betts is a 89 y.o. female w CAD s/p PCI, cognitive decline, chronic back pain, chronic urinary incontinence s/p bladder stimulator who presented from PMR office 10/25 w inability to walk and acute delirium with hallucination secondary to sleep deprivation. Patient improved following strict sleep regimen while admitted and is now back to her baseline. Patient transferred to my services on the AM of discharge, she has been accepted and will be discharged to [facility] for continued therapy and care. Patient reports overall doing well on AM of discharge, she denies any hallucinations and reports continuing to sleep well.         Hypoactive delirium, resolved  Sleep deprivation, improved  Alcohol withdrawal, ruled out  Patient presented with acute delirium and hallucination.  Initially thought to be secondary to alcohol withdrawal.  She drinks only 1 drink per night and never had any history of alcohol drawl before.  I strongly think that she suffered hypoactive delirium from sleep deprivation secondary to her  worsening chronic arthritis pain of her shoulders and hips  Hypoactive delirium completely resolved after discontinuing her benzodiazepines ( CIWA protocol) and adding Seroquel to regulate her sleep cycle.  Now she is fully awake and alert.  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  X-ray with evidence of severe arthritis without any acute fractures   PT/OT consulted, recommend inpatient rehabilitation, CM following      Chronic urinary incontinence s/p bladder stim   Cognitive impairment   Continue donepezil  .  CAD s/p PCI   Continue statin and ASA      Discharge Follow Up Recommendations for outpatient labs/diagnostics:   Follow up with PCP 1 week following discharge from [facility]     Day of Discharge     HPI:   Patient is an 88 yo F seen and examined by me this AM, she is resting comfortably in chair, she reports having some mild thigh pain this AM and noted over her IT band, additional voltaren gel ordered. Plans for discharge to rehab today.       Vital Signs:   Temp:  [98.3 °F (36.8 °C)-98.8 °F (37.1 °C)] 98.6 °F (37 °C)  Heart Rate:  [58-71] 71  Resp:  [18] 18  BP: (126-138)/(48-70) 126/59      Physical Exam:  Constitutional: No acute distress, awake, alert, frail and elderly appearing, sitting in chair   HENT: NCAT, mucous membranes moist  Respiratory: Clear to auscultation bilaterally, respiratory effort normal   Cardiovascular: RRR, no murmurs, rubs, or gallops  Gastrointestinal: Positive bowel sounds, soft, nontender, nondistended  Musculoskeletal: No bilateral ankle  edema  Psychiatric: Appropriate affect, cooperative  Neurologic: Oriented x 3, strength symmetric in all extremities, Cranial Nerves grossly intact to confrontation, speech clear  Skin: No rashes      Pertinent  and/or Most Recent Results     LAB RESULTS:      Lab 11/04/24  0428 10/31/24  0457   WBC 5.32 6.34   HEMOGLOBIN 12.4 12.7   HEMATOCRIT 38.4 38.0   PLATELETS 162 149   MCV 99.2* 96.4         Lab 10/31/24  1255 10/31/24  0457 10/28/24  1319   SODIUM  --  141 140   POTASSIUM  --  3.9 4.0   CHLORIDE  --  105 102   CO2  --  25.0 27.0   ANION GAP  --  11.0 11.0   BUN  --  22 17   CREATININE  --  0.93 0.82   EGFR  --  59.2* 68.9   GLUCOSE  --  112* 126*   CALCIUM  --  9.2 9.7   TSH 2.880  --   --                      Lab 10/31/24  1255   FOLATE >20.00   VITAMIN B 12 864         Brief Urine Lab Results  (Last result in the past 365 days)        Color   Clarity   Blood   Leuk Est   Nitrite   Protein   CREAT   Urine HCG        10/25/24 1645 Yellow   Clear   Trace   Moderate (2+)   Negative   Trace                 Microbiology Results (last 10 days)       Procedure Component Value - Date/Time    Blood Culture - Blood, Arm, Right [[id number]]  (Normal) Collected: 10/25/24 1808    Lab Status: Final result Specimen: Blood from Arm, Right Updated: 10/30/24 1832     Blood Culture No growth at 5 days    Narrative:      Less than seven (7) mL's of blood was collected.  Insufficient quantity may yield false negative results.    Blood Culture - Blood, Arm, Left [[id number]]  (Normal) Collected: 10/25/24 1758    Lab Status: Final result Specimen: Blood from Arm, Left Updated: 10/30/24 1832     Blood Culture No growth at 5 days    Narrative:      Less than seven (7) mL's of blood was collected.  Insufficient quantity may yield false negative results.    Urine Culture - Urine, Urine, Clean Catch [[id number]] Collected: 10/25/24 1645    Lab Status: Final result Specimen: Urine, Clean Catch Updated: 10/26/24 1426     Urine Culture  >100,000 CFU/mL Mixed Nica Isolated    Narrative:      Specimen contains mixed organisms of questionable pathogenicity suggestive of contamination. If symptoms persist, suggest recollection.  Colonization of the urinary tract without infection is common. Treatment is discouraged unless the patient is symptomatic, pregnant, or undergoing an invasive urologic procedure.            XR Hip With or Without Pelvis 1 View Left    Result Date: 11/2/2024  XR HIP W OR WO PELVIS 1 VIEW LEFT Date of Exam: 11/2/2024 12:20 PM EDT Indication: left hip pain Comparison: CT 4/23/2023. Findings: Cortical margins appear intact, without evidence of acute fracture or traumatic malalignment. Moderate arthrosis changes are present, with narrowing, sclerosis and small osteophytes.     Impression: Moderate left hip arthrosis without evidence of acute fracture or traumatic malalignment. Electronically Signed: Jules Carrillo MD  11/2/2024 4:43 PM EDT  Workstation ID: [id number]    MRI Lumbar Spine Without Contrast    Result Date: 10/30/2024  MRI LUMBAR SPINE WO CONTRAST Date of Exam: 10/30/2024 9:10 AM EDT Indication: L-spine disease evaluation, h/o L-spine stenosis and radiculopathy.  Comparison: 10/25/2024 CT Technique:  Routine multiplanar/multisequence sequence images of the lumbar spine were obtained without contrast administration.  Findings: No evidence of acute fracture. There is grade 1 anterolisthesis of L4 on L5 measuring 6 mm. No evidence of additional listhesis or malalignment. No suspicious bone lesion. The included spinal cord is normal in signal and appearance. The conus medullaris terminates at L1-2. Nerve roots of the cauda equina are unremarkable. Paraspinal soft tissues show no acute abnormality. Level by level detail as follows: L1-2: Mild facet arthropathy. No significant canal or significant neural foraminal narrowing. L2-3: Mild facet arthropathy. No significant canal or significant neural foraminal narrowing. L3-4: Mild  broad-based disc bulge with superimposed left paracentral/foraminal disc protrusion with mild effacement of the left lateral recess. There is mild bilateral facet arthropathy. Mild bilateral neural foraminal narrowing. L4-5: Grade 1 anterolisthesis with uncovering of the posterior superior disc. There is bilateral facet arthropathy with small facet joint effusions. Mild canal narrowing. Mild bilateral neuroforaminal narrowing. L5-S1: Broad-based disc bulge with bilateral facet arthropathy. No significant canal narrowing. Mild right neuroforaminal narrowing.     Impression: Degenerative changes of the lumbar spine including grade 1 anterolisthesis of L4-5. There is mild L4-5 canal narrowing and effacement of the left lateral recess at L3-4. Varying multilevel neural foraminal narrowing, mild bilaterally at L3-4 and L4-5 as well as mild on the right at L5-S1. Level-by-level details as above. Electronically Signed: Fred Keller MD  10/30/2024 11:09 AM EDT  Workstation ID: [id number]    Adult Transthoracic Echo Complete W/ Cont if Necessary Per Protocol    Result Date: 10/27/2024    Left ventricular ejection fraction appears to be 56 - 60%.   Estimated right ventricular systolic pressure from tricuspid regurgitation is normal (<35 mmHg).   No significant valvular disease     XR Shoulder 2+ View Left    Result Date: 10/26/2024  XR SHOULDER 2+ VW LEFT Date of Exam: 10/26/2024 9:33 AM EDT Indication: limited ROM left shoulder Comparison: None available. Findings: Left shoulder joint appears anatomically aligned and bony structures appear to be intact. There is relatively mild glenohumeral and AC joint DJD, for the patient's age. No fracture, avulsion, destructive or blastic bony lesion is seen. Included bones of the thorax appear clear. No significant left lung disease is seen.     Impression: Mild left shoulder joint DJD, not unusual for age. No evidence of acute or healing left shoulder trauma. Electronically Signed: Mil  MD Don  10/26/2024 1:31 PM EDT  Workstation ID: [id number]    CT Lumbar Spine Without Contrast    Result Date: 10/25/2024  CT LUMBAR SPINE WO CONTRAST Date of Exam: 10/25/2024 5:36 PM EDT Indication: LE weakness, back pain. Comparison: CT abdomen and pelvis 4/23/2023 Technique: Axial CT images were obtained of the lumbar spine without contrast administration.  Reconstructed coronal and sagittal images were also obtained. Automated exposure control and iterative construction methods were used. Findings: Vertebral body heights are maintained. Unchanged grade 1 anterolisthesis of L4 on L5. Otherwise overall alignment is maintained. Mild loss of intervertebral disc base height and degenerative changes noted throughout the visualized spine. The visualized lung bases are clear. Mild calcification of the visualized abdominal aorta. Presumed cyst within the right hepatic lobe. Extensive colonic diverticulosis without evidence of acute diverticulitis. Otherwise the visualized intra-abdominal, intrapelvic and paravertebral soft tissues are unremarkable     Impression: No acute lumbar spine abnormality. Mild degenerative changes noted throughout the visualized spine, similar in appearance to prior CT abdomen and pelvis. Electronically Signed: Yash Galvan DO  10/25/2024 5:52 PM EDT  Workstation ID: [id number]    CT Head Without Contrast    Result Date: 10/25/2024  CT HEAD WO CONTRAST Date of Exam: 10/25/2024 5:36 PM EDT Indication: dizziness. Comparison: 7/29/2024 Technique: Axial CT images were obtained of the head without contrast administration.  Automated exposure control and iterative construction methods were used. Findings: No large territory infarct. There is no evidence of hemorrhage. No mass effect, edema or midline shift Mild periventricular and subcortical white matter hypodensities, nonspecific but most likely represents chronic small vessel ischemic changes. No extra-axial fluid collection. The ventricles are  normal in size and configuration. Status post right lens extraction. Otherwise the orbits are unremarkable. The visualized paranasal sinuses and mastoid air cells are clear. The visualized soft tissues are unremarkable. No acute osseous abnormality.     Impression: No acute intracranial abnormality. Electronically Signed: Yash Galvan DO  10/25/2024 5:50 PM EDT  Workstation ID: [id number]             Results for orders placed during the hospital encounter of 10/25/24    Adult Transthoracic Echo Complete W/ Cont if Necessary Per Protocol    Interpretation Summary    Left ventricular ejection fraction appears to be 56 - 60%.    Estimated right ventricular systolic pressure from tricuspid regurgitation is normal (<35 mmHg).    No significant valvular disease      Plan for Follow-up of Pending Labs/Results: N/A     Discharge Details        Discharge Medications        New Medications        Instructions Start Date   Diclofenac Sodium 1 % gel gel  Commonly known as: VOLTAREN   2 g, Topical, 4 Times Daily      losartan 25 MG tablet  Commonly known as: COZAAR   25 mg, Oral, Every 24 Hours Scheduled   Start Date: November 5, 2024     QUEtiapine 25 MG tablet  Commonly known as: SEROquel   25 mg, Oral, Nightly             Continue These Medications        Instructions Start Date   alendronate 70 MG tablet  Commonly known as: FOSAMAX   70 mg, Every 7 Days      aspirin 81 MG chewable tablet   81 mg, Daily      donepezil 10 MG tablet  Commonly known as: ARICEPT   10 mg, Daily      fluticasone 50 MCG/ACT nasal spray  Commonly known as: FLONASE   2 sprays, Daily      Magnesium 400 MG tablet   1 tablet, Daily      meclizine 25 MG tablet  Commonly known as: ANTIVERT   25 mg, Oral, 3 Times Daily PRN      melatonin 5 MG tablet tablet   10 mg, Nightly      multivitamin with minerals tablet tablet   1 tablet, Daily      nitroglycerin 0.4 MG SL tablet  Commonly known as: Nitrostat   0.4 mg, Sublingual, Every 5 Minutes PRN, Take no  more than 3 doses in 15 minutes.      pregabalin 50 MG capsule  Commonly known as: LYRICA   50 mg, 3 Times Daily      rosuvastatin 10 MG tablet  Commonly known as: CRESTOR   10 mg, Daily      vitamin b complex capsule capsule   1 capsule, Daily             Stop These Medications      atorvastatin 20 MG tablet  Commonly known as: LIPITOR     baclofen 10 MG tablet  Commonly known as: LIORESAL     diphenhydrAMINE 25 MG tablet  Commonly known as: BENADRYL              Allergies   Allergen Reactions    Pb-Hyoscy-Atropine-Scopolamine Anaphylaxis    Lisinopril Hives         Discharge Disposition:  Rehab Facility or Unit (DC - External)    Diet:  Hospital:  Diet Order   Procedures    Diet: Cardiac; Healthy Heart (2-3 Na+); Fluid Consistency: Thin (IDDSI 0)            Activity:  Continued therapy at [facility]     Restrictions or Other Recommendations:  Follow up as below        CODE STATUS:    Code Status and Medical Interventions: No CPR (Do Not Attempt to Resuscitate); Limited Support; No intubation (DNI)   Ordered at: 10/25/24 1043     Medical Intervention Limits:    No intubation (DNI)     Code Status (Patient has no pulse and is not breathing):    No CPR (Do Not Attempt to Resuscitate)     Medical Interventions (Patient has pulse or is breathing):    Limited Support       Future Appointments   Date Time Provider Department Center   5/15/2025  2:15 PM Antonino Koo MD [facility]       Additional Instructions for the Follow-ups that You Need to Schedule       Discharge Follow-up with PCP   As directed       Currently Documented PCP:    Sena Lomeli MD    PCP Phone Number:    [phone]     Follow Up Details: Follow up with PCP in 1 week following discharge from [facility]                      CRISTAL Chavez DO  11/04/24      Time Spent on Discharge:  I spent  40  minutes on this discharge activity which included: face-to-face encounter with the patient, reviewing the data in the system, coordination of the care  with the nursing staff as well as consultants, documentation, and entering orders.

## 2024-11-04 NOTE — CASE MANAGEMENT/SOCIAL WORK
Case Management Discharge Note      Final Note: Pt is discharging to acute rehab at [facility] today. She is on room air.  confirmed with April, facility liaison, Pt can be accepted today. Facility will retrieve discharge summary from [facility]. RN to call report to [phone]. [facility] will  Pt at 1430. RN needs to have Pt at maternity entrance at 1420: RN informed. Pt and son, Bill, aware and agreeable to plan. No other discharge needs identified.         Selected Continued Care - Admitted Since 10/25/2024       Destination Coordination complete.      Service Provider Services Address Phone Fax Patient Preferred    [facility] [address] [address] [phone] --              Durable Medical Equipment    No services have been selected for the patient.                Dialysis/Infusion    No services have been selected for the patient.                Home Medical Care    No services have been selected for the patient.                Therapy    No services have been selected for the patient.                Community Resources    No services have been selected for the patient.                Community & DME    No services have been selected for the patient.                    Transportation Services  W/C Van: Other ([facility] van 11/4 @ 1430)    Final Discharge Disposition Code: 62 - inpatient rehab facility

## 2024-11-04 NOTE — PLAN OF CARE
Goal Outcome Evaluation:  Plan of Care Reviewed With: patient        Progress: improving  Outcome Evaluation: Pt presents below fxl baseline with decreased activity tolerance, generalized weakness, and impaired balance with mobility. Pt shows improvement in cognition this session from previous session based on chart review. Pt would benefit from continued OT services to progress to PLOF. Recommend d/c to IRF.    Anticipated Discharge Disposition (OT): inpatient rehabilitation facility

## 2024-11-04 NOTE — DISCHARGE PLACEMENT REQUEST
Monique Betts (89 y.o. Female)    To [facility]  From Barbara Molina RN Case Mgr [phone]       All medication doses during the admission are shown, including meds that are no longer on order.  Scheduled Meds Sorted by Name  for Monique Betts as of 11/2/24 through 11/4/24    1 Day 3 Days 7 Days 10 Days < Today >   Legend:       Medications 11/02/24 11/03/24 11/04/24   aspirin chewable tablet 81 mg  Dose: 81 mg  Freq: Daily Route: PO  Start: 10/26/24 0900   Admin Instructions:       0857        0913        0917          bisacodyl (DULCOLAX) EC tablet 20 mg  Dose: 20 mg  Freq: Once Route: PO  Start: 11/02/24 1700 End: 11/02/24 1651   Admin Instructions:       1651            cefTRIAXone (ROCEPHIN) 1,000 mg in sodium chloride 0.9 % 100 mL MBP  Dose: 1,000 mg  Freq: Every 24 Hours Route: IV  Indications of Use: URINARY TRACT INFECTION  Last Dose: 1,000 mg (10/27/24 1244)  Start: 10/26/24 1200 End: 10/28/24 1107   Admin Instructions:            cefTRIAXone (ROCEPHIN) 1,000 mg in sodium chloride 0.9 % 100 mL MBP  Dose: 1,000 mg  Freq: Once Route: IV  Indications of Use: URINARY TRACT INFECTION  Last Dose: Stopped (10/25/24 1901)  Start: 10/25/24 1806 End: 10/25/24 1901   Admin Instructions:            Diclofenac Sodium (VOLTAREN) 1 % gel 2 g  Dose: 2 g  Freq: 4 Times Daily Route: TOP  Start: 11/04/24 1200   Admin Instructions:         1200   1800   2100        donepezil (ARICEPT) tablet 10 mg  Dose: 10 mg  Freq: Daily Route: PO  Start: 10/26/24 0900    0858        0913        0917          folic acid (FOLVITE) tablet 1 mg  Dose: 1 mg  Freq: Daily Route: PO  Start: 10/28/24 1015    0929        0913        0917          gabapentin (NEURONTIN) capsule 100 mg  Dose: 100 mg  Freq: Nightly Route: PO  Start: 11/02/24 2100   Admin Instructions:       2016 2048 2100          heparin (porcine) 5000 UNIT/ML injection 5,000 Units  Dose: 5,000 Units  Freq: Every 8 Hours Scheduled Route: SC  Indications  of Use: PROPHYLAXIS OF VENOUS THROMBOEMBOLISM  Start: 10/26/24 1400    0523   1517   2016             0635   1419   2048             0522   1400 2200        HYDROmorphone (DILAUDID) injection 0.25 mg  Dose: 0.25 mg  Freq: Once Route: IV  Start: 10/25/24 1741 End: 10/25/24 1821   Admin Instructions:            lactulose (CHRONULAC) 10 GM/15ML solution 30 g  Dose: 30 g  Freq: Once Route: PO  Start: 11/02/24 1700 End: 11/02/24 1651   Admin Instructions:       1651            Lidocaine 4 % 1 patch  Dose: 1 patch  Freq: Every 24 Hours Scheduled Route: TD  Start: 11/03/24 1630   Admin Instructions:        1618        0522   0917   2117         LORazepam (ATIVAN) tablet 1 mg  Dose: 1 mg  Freq: Every 12 Hours Scheduled Route: PO  Start: 10/30/24 1100 End: 10/30/24 2011   Admin Instructions:            Followed by   LORazepam (ATIVAN) tablet 1 mg  Dose: 1 mg  Freq: Daily Route: PO  Start: 10/31/24 0900 End: 10/31/24 0922   Admin Instructions:             LORazepam (ATIVAN) tablet 2 mg  Dose: 2 mg  Freq: Every 6 Hours Route: PO  Start: 10/28/24 1015 End: 10/29/24 1014   Admin Instructions:            Followed by   LORazepam (ATIVAN) tablet 1 mg  Dose: 1 mg  Freq: Every 6 Hours Route: PO  Start: 10/29/24 1015 End: 10/30/24 1014   Admin Instructions:            Followed by   LORazepam (ATIVAN) tablet 1 mg  Dose: 1 mg  Freq: Every 12 Hours Scheduled Route: PO  Start: 10/30/24 1015 End: 10/30/24 1023   Admin Instructions:            Followed by   LORazepam (ATIVAN) tablet 1 mg  Dose: 1 mg  Freq: Daily Route: PO  Start: 10/31/24 0900 End: 10/30/24 1023   Admin Instructions:            losartan (COZAAR) tablet 25 mg  Dose: 25 mg  Freq: Every 24 Hours Scheduled Route: PO  Start: 10/26/24 1945   Admin Instructions:       0858        0913        0917          melatonin tablet 10 mg  Dose: 10 mg  Freq: Nightly Route: PO  Start: 11/03/24 2100 2048 2100          ondansetron (ZOFRAN) injection 4 mg  Dose: 4 mg  Freq: Once  Route: IV  Start: 10/25/24 1741 End: 10/25/24 1820   Admin Instructions:            pregabalin (LYRICA) capsule 50 mg  Dose: 50 mg  Freq: 3 Times Daily Route: PO  Start: 10/25/24 2330   Admin Instructions:       0858   1651   2016      0913   1619 2048 0917 1600 2100        QUEtiapine (SEROquel) tablet 12.5 mg  Dose: 12.5 mg  Freq: Daily Route: PO  Start: 10/31/24 1445 End: 11/02/24 1056   Admin Instructions:       0858   1056-D/C'd         QUEtiapine (SEROquel) tablet 25 mg  Dose: 25 mg  Freq: Nightly Route: PO  Start: 10/31/24 2100   Admin Instructions:       2016 2048 2100          rosuvastatin (CRESTOR) tablet 10 mg  Dose: 10 mg  Freq: Daily Route: PO  Start: 10/26/24 0900   Admin Instructions:       0858        0913 0917           sennosides-docusate (PERICOLACE) 8.6-50 MG per tablet 2 tablet  Dose: 2 tablet  Freq: 2 Times Daily Route: PO  Start: 10/27/24 2100   Admin Instructions:       0858   2016       (0355) [phone]   2100         And   polyethylene glycol (MIRALAX) packet 17 g  Dose: 17 g  Freq: Daily PRN Route: PO  PRN Reason: Constipation  PRN Comment: Use if senna-docusate is ineffective  Start: 10/27/24 1426   Admin Instructions:       1526            And   bisacodyl (DULCOLAX) EC tablet 5 mg  Dose: 5 mg  Freq: Daily PRN Route: PO  PRN Reason: Constipation  PRN Comment: Use if polyethylene glycol is ineffective  Start: 10/27/24 1426   Admin Instructions:       1536            And   bisacodyl (DULCOLAX) suppository 10 mg  Dose: 10 mg  Freq: Daily PRN Route: RE  PRN Reason: Constipation  PRN Comment: Use if bisacodyl oral is ineffective  Start: 10/27/24 1426   Admin Instructions:            sodium chloride 0.9 % flush 10 mL  Dose: 10 mL  Freq: Every 12 Hours Scheduled Route: IV  Start: 10/25/24 2330    0858   2017 0913 2048 0917 2100         thiamine (B-1) 250 mg in sodium chloride 0.9 % 100 mL IVPB  Dose: 250 mg  Freq: Daily Route: IV  Last  Dose: 250 mg (11/03/24 0913)  Start: 11/01/24 0900 End: 11/03/24 0943   Admin Instructions:       0858        0913            thiamine (B-1) injection 200 mg  Dose: 200 mg  Freq: Every 8 Hours Scheduled Route: IV  Start: 10/28/24 1400 End: 10/31/24 1057   Admin Instructions:            Followed by   thiamine (VITAMIN B-1) tablet 100 mg  Dose: 100 mg  Freq: Daily Route: PO  Start: 11/02/24 1400 End: 10/31/24 1057                     Continuous Meds Sorted by Name  for Monique Betts as of 11/2/24 through 11/4/24  Legend:       Medications 11/02/24 11/03/24 11/04/24   sodium chloride 0.9 % infusion  Rate: 75 mL/hr Dose: 75 mL/hr  Freq: Continuous Route: IV  Last Dose: Stopped (10/26/24 0833)  Start: 10/25/24 2330 End: 10/26/24 0705                     PRN Meds Sorted by Name  for Monique Betts as of 11/2/24 through 11/4/24  Legend:       Medications 11/02/24 11/03/24 11/04/24    acetaminophen (TYLENOL) tablet 650 mg  Dose: 650 mg  Freq: Every 4 Hours PRN Route: PO  PRN Reason: Mild Pain  Start: 10/25/24 2234   Admin Instructions:            Or   acetaminophen (TYLENOL) 160 MG/5ML oral solution 650 mg  Dose: 650 mg  Freq: Every 4 Hours PRN Route: PO  PRN Reason: Mild Pain  Start: 10/25/24 2234   Admin Instructions:            Or   acetaminophen (TYLENOL) suppository 650 mg  Dose: 650 mg  Freq: Every 4 Hours PRN Route: RE  PRN Reason: Mild Pain  Start: 10/25/24 2234   Admin Instructions:             sennosides-docusate (PERICOLACE) 8.6-50 MG per tablet 2 tablet  Dose: 2 tablet  Freq: 2 Times Daily Route: PO  Start: 10/27/24 2100   Admin Instructions:       0858   2016       (2046) [phone]   2100         And   polyethylene glycol (MIRALAX) packet 17 g  Dose: 17 g  Freq: Daily PRN Route: PO  PRN Reason: Constipation  PRN Comment: Use if senna-docusate is ineffective  Start: 10/27/24 1426   Admin Instructions:       1526            And   bisacodyl (DULCOLAX) EC tablet 5 mg  Dose: 5 mg  Freq: Daily PRN  Route: PO  PRN Reason: Constipation  PRN Comment: Use if polyethylene glycol is ineffective  Start: 10/27/24 1426   Admin Instructions:       1536            And   bisacodyl (DULCOLAX) suppository 10 mg  Dose: 10 mg  Freq: Daily PRN Route: RE  PRN Reason: Constipation  PRN Comment: Use if bisacodyl oral is ineffective  Start: 10/27/24 1426   Admin Instructions:            Diclofenac Sodium (VOLTAREN) 1 % gel 2 g  Dose: 2 g  Freq: 4 Times Daily PRN Route: TOP  PRN Comment: as needed for left shoulder and left hip pain  Start: 11/02/24 1047 End: 11/04/24 1013   Admin Instructions:       2016 0912        0918   1013-D/C'd       HYDROcodone-acetaminophen (NORCO) 5-325 MG per tablet 1 tablet  Dose: 1 tablet  Freq: Every 6 Hours PRN Route: PO  PRN Reason: Moderate Pain  Start: 10/26/24 0919 End: 10/31/24 0918   Admin Instructions:            HYDROcodone-acetaminophen (NORCO) 7.5-325 MG per tablet 1 tablet  Dose: 1 tablet  Freq: Every 6 Hours PRN Route: PO  PRN Reason: Moderate Pain  Start: 11/01/24 1505 End: 11/06/24 1504   Admin Instructions:       0901   1518       0922   1439       0917          HYDROcodone-acetaminophen (NORCO) 7.5-325 MG per tablet 1 tablet  Dose: 1 tablet  Freq: Every 6 Hours PRN Route: PO  PRN Reason: Severe Pain  Start: 10/26/24 1815 End: 10/31/24 1814   Admin Instructions:            hydrOXYzine (ATARAX) tablet 25 mg  Dose: 25 mg  Freq: 3 Times Daily PRN Route: PO  PRN Reason: Anxiety  Start: 10/27/24 1443   Admin Instructions:             LORazepam (ATIVAN) tablet 1 mg  Dose: 1 mg  Freq: Every 1 Hour PRN Route: PO  PRN Reason: Withdrawal  PRN Comment: For CIWA-Ar 8-10  Start: 10/28/24 0921 End: 10/31/24 1055   Admin Instructions:            Or   midazolam (VERSED) injection 2 mg  Dose: 2 mg  Freq: Every 1 Hour PRN Route: IV  PRN Comment: For CIWA-Ar 8-10  Start: 10/28/24 0921 End: 10/31/24 1055   Admin Instructions:            Or   LORazepam (ATIVAN) tablet 2 mg  Dose: 2 mg  Freq: Every 1  Hour PRN Route: PO  PRN Reason: Withdrawal  PRN Comment: For CIWA-Ar 11-15 or -160, DBP , -125  Start: 10/28/24 0921 End: 10/31/24 1055   Admin Instructions:            Or   midazolam (VERSED) injection 4 mg  Dose: 4 mg  Freq: Every 1 Hour PRN Route: IV  PRN Comment: For CIWA-Ar 11-15 or -160, DBP , -125  Start: 10/28/24 0921 End: 10/31/24 1055   Admin Instructions:            Or   midazolam (VERSED) injection 4 mg  Dose: 4 mg  Freq: Every 15 Minutes PRN Route: IV  PRN Comment: For CIWA-Ar Greater Than 15 or SBP greater than 160, DBP greater than 110, or HR greater than 125  Start: 10/28/24 0921 End: 10/31/24 1055   Admin Instructions:            Or   midazolam (VERSED) injection 4 mg  Dose: 4 mg  Freq: Every 15 Minutes PRN Route: IM  PRN Comment: If Unable to Administer IV - For CIWA-Ar Greater Than 15 or SBP greater than 160, DBP greater than 110, or HR greater than 125  Start: 10/28/24 0921 End: 10/31/24 1055   Admin Instructions:            Magnesium Standard Dose Replacement - Follow Nurse / BPA Driven Protocol  Freq: As Needed Route: XX  PRN Reason: Other  Start: 10/28/24 0920   Admin Instructions:            melatonin tablet 5 mg  Dose: 5 mg  Freq: Nightly PRN Route: PO  PRN Reason: Sleep  Start: 10/25/24 2234 End: 11/03/24 0851     0851-D/C'd         nitroglycerin (NITROSTAT) SL tablet 0.4 mg  Dose: 0.4 mg  Freq: Every 5 Minutes PRN Route: SL  PRN Reason: Chest Pain  PRN Comment: Only if SBP Greater Than 100  Start: 10/25/24 2231   Admin Instructions:            ondansetron (ZOFRAN) injection 4 mg  Dose: 4 mg  Freq: Every 6 Hours PRN Route: IV  PRN Reasons: Nausea,Vomiting  Start: 10/26/24 1918         ondansetron ODT (ZOFRAN-ODT) disintegrating tablet 4 mg  Dose: 4 mg  Freq: Every 6 Hours PRN Route: TL  PRN Reasons: Nausea,Vomiting  Start: 10/26/24 1918   Admin Instructions:            sodium chloride 0.9 % flush 10 mL  Dose: 10 mL  Freq: As Needed Route: IV  PRN  "Reason: Line Care  Start: 10/25/24 2231          sodium chloride 0.9 % flush 10 mL  Dose: 10 mL  Freq: As Needed Route: IV  PRN Reason: Line Care  Start: 10/25/24 1628         sodium chloride 0.9 % infusion 40 mL  Dose: 40 mL  Freq: As Needed Route: IV  PRN Reason: Line Care  Start: 10/25/24 2231 End: 10/30/24 2230   Admin Instructions:                         Date of Birth   1935    Social Security Number       Address   [address] [place] [address]  [place] [address]    Home Phone   [phone]    MRN   [id number]       Taoism   Roman Catholic    Marital Status                               Admission Date   10/25/24    Admission Type   Emergency    Admitting Provider   BESSIE Chavez DO    Attending Provider   BESSIE Chavez DO    Department, Room/Bed   [facility] 6B, N629/1       Discharge Date       Discharge Disposition       Discharge Destination                                 Attending Provider: BESSIE Chavez DO    Allergies: Pb-hyoscy-atropine-scopolamine, Lisinopril    Isolation: None   Infection: None   Code Status: No CPR    Ht: 162.6 cm (64\")   Wt: 68.9 kg (152 lb)    Admission Cmt: None   Principal Problem: Failure to thrive in adult [R62.7]                   Active Insurance as of 10/25/2024       Primary Coverage       Payor Plan Insurance Group Employer/Plan Group    MEDICARE MEDICARE A & B        Payor Plan Address Payor Plan Phone Number Payor Plan Fax Number Effective Dates    [address] [phone]  4/1/2001 - None Entered    [facility] [id number]         Subscriber Name Subscriber Birth Date Member ID       CAROLYN MANNING 1935 [id number]               Secondary Coverage       Payor Plan Insurance Group Employer/Plan Group    [id number]       Payor Plan Address Payor Plan Phone Number Payor Plan Fax Number Effective Dates    [address]   7/1/2003 - None Entered    [facility] [id number]         Subscriber Name Subscriber Birth " "Date Member ID       MONIQUE BETTS 1935 [id number]                     Emergency Contacts        (Rel.) Home Phone Work Phone Mobile Phone    Brandan Betts (Son) -- -- [phone]    Ann Marie Betts (Other) -- -- [phone]    Vivek Betts Jr (Son) [phone] -- --    MICHAEL ARGUETA (Grandchild) [phone] -- [phone]                 History & Physical        Ramiro Lundy PA-C at 10/25/24 2132       Attestation signed by Kerley, Brian Joseph, DO at 10/25/24 2303    Son reportedly said he can bring in the remote for her pain stimulator so they can put it in MRI mode.    I have reviewed this documentation and agree.                      [facility]  HISTORY AND PHYSICAL    Patient Name: Monique Betts  : 1935  MRN: [id number]  Primary Care Physician: Sena Lomeli MD  Date of admission: 10/25/2024    Subjective  Subjective     Chief Complaint:  Generalized weakness, back pain      HPI:  Monique Betts is a 89 y.o. female with a history of CAD, HTN, HLD, Cognitive decline, and chronic back pain who presents to [facility] ED for complaint of worsening generalized weakness and failure to thrive. She reports that she lives alone and is usually able to ambulate with a walker. However, over the last several days, she has had increasing difficulty ambulating even with a walker. He denies recent falls, but states that her legs \"feel like jello\". She denies fever, chills, chest pain, SOB, cough, abdominal pain, nausea or vomiting. She has a history of chronic low back pain and has a nerve stimulator in place. She does report that her back seems to be hurting more than usual as well. Her son, whom was present at bedside in the ED reports that he took her to see her pain specialist at [facility] earlier today and she was subsequently referred here to the emergency department.         Review of Systems   Constitutional:  " Positive for fatigue. Negative for chills, fever and unexpected weight change.   HENT:  Negative for nosebleeds, sore throat and trouble swallowing.    Eyes:  Negative for photophobia and visual disturbance.   Respiratory:  Negative for cough, shortness of breath and wheezing.    Cardiovascular:  Negative for chest pain and palpitations.   Gastrointestinal:  Negative for abdominal pain, diarrhea, nausea and vomiting.   Genitourinary:  Negative for dysuria and hematuria.   Musculoskeletal:  Positive for arthralgias, back pain and myalgias.   Skin: Negative.    Neurological:  Negative for tremors, syncope, weakness and headaches.   Psychiatric/Behavioral:  Negative for confusion. The patient is not nervous/anxious.                 Personal History     Past Medical History:   Diagnosis Date    Cancer     Heart murmur 2000    Skin Cancer    Sleep apnea 1999    Not using machine for several years.             Past Surgical History:   Procedure Laterality Date    CARDIAC CATHETERIZATION N/A 06/08/2022    Procedure: LEFT HEART CATH;  Surgeon: Antonino Koo MD;  Location: [facility] CATH INVASIVE LOCATION;  Service: Cardiovascular;  Laterality: N/A;       Family History:  family history includes Breast cancer (age of onset: 60) in her maternal aunt; Ovarian cancer (age of onset: 46) in her daughter.     Social History:  reports that she has never smoked. She has never used smokeless tobacco. She reports current alcohol use of about 7.0 standard drinks of alcohol per week. She reports that she does not use drugs.  Social History     Social History Narrative    Not on file       Medications:  Magnesium, alendronate, aspirin, atorvastatin, baclofen, diphenhydrAMINE, donepezil, fluticasone, meclizine, melatonin, multivitamin with minerals, nitroglycerin, pregabalin, rosuvastatin, and vitamin b complex    Allergies   Allergen Reactions    Pb-Hyoscy-Atropine-Scopolamine Anaphylaxis    Lisinopril Hives       Objective  Objective      Vital Signs:   Temp:  [96.5 °F (35.8 °C)-97.6 °F (36.4 °C)] 96.5 °F (35.8 °C)  Heart Rate:  [49-64] 56  Resp:  [18] 18  BP: (139-158)/(66-94) 158/81  Flow (L/min) (Oxygen Therapy):  [2] 2    Physical Exam  Constitutional:       General: She is not in acute distress.     Appearance: Normal appearance.   HENT:      Head: Atraumatic.      Right Ear: External ear normal.      Left Ear: External ear normal.      Nose: Nose normal.   Eyes:      Extraocular Movements: Extraocular movements intact.      Conjunctiva/sclera: Conjunctivae normal.      Pupils: Pupils are equal, round, and reactive to light.   Cardiovascular:      Rate and Rhythm: Normal rate and regular rhythm.      Pulses: Normal pulses.      Heart sounds: Normal heart sounds. No murmur heard.  Pulmonary:      Effort: Pulmonary effort is normal. No respiratory distress.      Breath sounds: Normal breath sounds. No wheezing, rhonchi or rales.   Abdominal:      General: Bowel sounds are normal. There is no distension.      Tenderness: There is no abdominal tenderness. There is no guarding or rebound.   Musculoskeletal:         General: Normal range of motion.      Cervical back: No rigidity.      Right lower leg: No edema.      Left lower leg: No edema.   Skin:     General: Skin is warm and dry.      Coloration: Skin is not jaundiced.      Findings: No lesion or rash.   Neurological:      General: No focal deficit present.      Mental Status: She is alert and oriented to person, place, and time.   Psychiatric:         Attention and Perception: Attention normal.         Mood and Affect: Mood normal.         Behavior: Behavior normal.         Thought Content: Thought content normal.          Result Review:  I have personally reviewed the results from the time of this admission to 10/25/2024 22:35 EDT and agree with these findings:  [x]  Laboratory list / accordion  []  Microbiology  [x]  Radiology  [x]  EKG/Telemetry   []  Cardiology/Vascular   []   Pathology  [x]  Old records  []  Other:      LAB RESULTS:      Lab 10/25/24  1808   WBC 6.39   HEMOGLOBIN 13.3   HEMATOCRIT 41.1   PLATELETS 203   NEUTROS ABS 2.94   IMMATURE GRANS (ABS) 0.01   LYMPHS ABS 2.64   MONOS ABS 0.65   EOS ABS 0.09   MCV 97.4*   PROCALCITONIN 0.06   LACTATE 1.1         Lab 10/25/24  1808   SODIUM 142   POTASSIUM 4.1   CHLORIDE 105   CO2 28.0   ANION GAP 9.0   BUN 22   CREATININE 1.00   EGFR 54.3*   GLUCOSE 169*   CALCIUM 9.6         Lab 10/25/24  1808   TOTAL PROTEIN 6.8   ALBUMIN 4.1   GLOBULIN 2.7   ALT (SGPT) 15   AST (SGOT) 21   BILIRUBIN 0.2   ALK PHOS 66                     Brief Urine Lab Results  (Last result in the past 365 days)        Color   Clarity   Blood   Leuk Est   Nitrite   Protein   CREAT   Urine HCG        10/25/24 1645 Yellow   Clear   Trace   Moderate (2+)   Negative   Trace                 Microbiology Results (last 10 days)       ** No results found for the last 240 hours. **            CT Lumbar Spine Without Contrast    Result Date: 10/25/2024  CT LUMBAR SPINE WO CONTRAST Date of Exam: 10/25/2024 5:36 PM EDT Indication: LE weakness, back pain. Comparison: CT abdomen and pelvis 4/23/2023 Technique: Axial CT images were obtained of the lumbar spine without contrast administration.  Reconstructed coronal and sagittal images were also obtained. Automated exposure control and iterative construction methods were used. Findings: Vertebral body heights are maintained. Unchanged grade 1 anterolisthesis of L4 on L5. Otherwise overall alignment is maintained. Mild loss of intervertebral disc base height and degenerative changes noted throughout the visualized spine. The visualized lung bases are clear. Mild calcification of the visualized abdominal aorta. Presumed cyst within the right hepatic lobe. Extensive colonic diverticulosis without evidence of acute diverticulitis. Otherwise the visualized intra-abdominal, intrapelvic and paravertebral soft tissues are unremarkable      Impression: Impression: No acute lumbar spine abnormality. Mild degenerative changes noted throughout the visualized spine, similar in appearance to prior CT abdomen and pelvis. Electronically Signed: Yash Galvan DO  10/25/2024 5:52 PM EDT  Workstation ID: [id number]    CT Head Without Contrast    Result Date: 10/25/2024  CT HEAD WO CONTRAST Date of Exam: 10/25/2024 5:36 PM EDT Indication: dizziness. Comparison: 7/29/2024 Technique: Axial CT images were obtained of the head without contrast administration.  Automated exposure control and iterative construction methods were used. Findings: No large territory infarct. There is no evidence of hemorrhage. No mass effect, edema or midline shift Mild periventricular and subcortical white matter hypodensities, nonspecific but most likely represents chronic small vessel ischemic changes. No extra-axial fluid collection. The ventricles are normal in size and configuration. Status post right lens extraction. Otherwise the orbits are unremarkable. The visualized paranasal sinuses and mastoid air cells are clear. The visualized soft tissues are unremarkable. No acute osseous abnormality.     Impression: Impression: No acute intracranial abnormality. Electronically Signed: Yash Galvan DO  10/25/2024 5:50 PM EDT  Workstation ID: [id number]     Results for orders placed during the hospital encounter of 06/08/22    Adult Transthoracic Echo Complete W/ Cont if Necessary Per Protocol    Interpretation Summary  · Left ventricular ejection fraction appears to be 61 - 65%. Left ventricular systolic function is normal.  · Left ventricular diastolic function was indeterminate.  · There is mild calcification of the aortic valve.  · Estimated right ventricular systolic pressure from tricuspid regurgitation is normal (<35 mmHg). Calculated right ventricular systolic pressure from tricuspid regurgitation is 26 mmHg.  · Left atrial volume is mildly increased.      Assessment &  Plan  Assessment & Plan       Failure to thrive in adult    UTI (urinary tract infection)    Coronary artery disease involving native coronary artery of native heart without angina pectoris    Dyslipidemia, goal LDL below 70    Essential hypertension    Cognitive decline    GERD without esophagitis      Monique Betts is a 89 y.o. female with a history of CAD, HTN, HLD, Cognitive decline, and chronic back pain who presents to [facility] ED for complaint of worsening generalized weakness and failure to thrive.     Failure to Thrive  Chronic Low back pain  -CT L spine only significant for chronic changes. MRI was ordered but unable to be obtained d/t presence of nerve stimulator.   -PT/OT   -Will ask case management to see in the am to discuss options for placement at discharge.   -Pain control    Acute UTI?  -UA tonight somewhat suspicious for UTI, however does also appear contaminated. Will send for culture  -Received Ceftriaxone in the ED. Ok to continue for now pending results of culture  -Gentle IV fluids    CAD  HTN  HLD  -Chest pain free  -Takes     GERD  -PPI    Cognitive Decline  -Continue Aricept            DVT prophylaxis:  SCDS    CODE STATUS:  DNR/DNI  Medical Intervention Limits: No intubation (DNI)  Code Status (Patient has no pulse and is not breathing): No CPR (Do Not Attempt to Resuscitate)  Medical Interventions (Patient has pulse or is breathing): Limited Support      Expected Discharge 1-2 days       This note has been completed as part of a split-shared workflow.     Signature: Electronically signed by Ramiro Lundy PA-C, 10/25/24, 10:29 PM EDT          \      Electronically signed by Kerley, Brian Joseph, DO at 10/25/24 0909       Physician Progress Notes (last 24 hours)  Notes from 11/03/24 1117 through 11/04/24 1117   No notes of this type exist for this encounter.

## 2024-11-04 NOTE — THERAPY PROGRESS REPORT/RE-CERT
Patient Name: Monique Betts  : 1935    MRN: [id number]                              Today's Date: 2024       Admit Date: 10/25/2024    Visit Dx:     ICD-10-CM ICD-9-CM   1. Acute on chronic low back pain  M54.50 724.2    G89.29 338.19     338.29   2. Acute UTI  N39.0 599.0     Patient Active Problem List   Diagnosis    NSTEMI (non-ST elevated myocardial infarction)    COVID-19 virus infection    Coronary artery disease involving native coronary artery of native heart without angina pectoris    Dyslipidemia, goal LDL below 70    Essential hypertension    Failure to thrive in adult    Cognitive decline    GERD without esophagitis     Past Medical History:   Diagnosis Date    Cancer     Heart murmur     Skin Cancer    Sleep apnea     Not using machine for several years.     Past Surgical History:   Procedure Laterality Date    CARDIAC CATHETERIZATION N/A 2022    Procedure: LEFT HEART CATH;  Surgeon: Antonino Koo MD;  Location: [facility] CATH INVASIVE LOC[facility];  Service: Cardiovascular;  Laterality: N/A;      General Information       Row Name 24 1144          OT Time and Intention    Document Type progress note/recertification  -     Mode of Treatment occupational therapy  -       Row Name 24 1144          General Information    Patient Profile Reviewed yes  -AJ     Existing Precautions/Restrictions fall;other (see comments)  LUE pain with movement  -AJ     Barriers to Rehab medically complex  -AJ       Row Name 24 1144          Cognition    Orientation Status (Cognition) oriented x 4  -       Row Name 24 1144          Safety Issues/Impairments Affecting Functional Mobility    Safety Issues Affecting Function (Mobility) insight into deficits/self-awareness;safety precaution awareness;safety precautions follow-through/compliance;sequencing abilities  -AJ     Impairments Affecting Function (Mobility) endurance/activity tolerance;pain;range of motion  (ROM);strength;balance  -               User Key  (r) = Recorded By, (t) = Taken By, (c) = Cosigned By      Initials Name Provider Type    Shania Ruiz OT Occupational Therapist                     Mobility/ADL's       Row Name 11/04/24 1145          Transfers    Transfers sit-stand transfer;stand-sit transfer  -       Row Name 11/04/24 1145          Sit-Stand Transfer    Sit-Stand [place] (Transfers) contact guard;1 person assist;verbal cues  -     Assistive Device (Sit-Stand Transfers) walker, front-wheeled  -       Row Name 11/04/24 1145          Stand-Sit Transfer    Stand-Sit [place] (Transfers) contact guard;1 person assist;verbal cues  -     Assistive Device (Stand-Sit Transfers) walker, front-wheeled  -       Row Name 11/04/24 1145          Functional Mobility    Functional Mobility- Ind. Level contact guard assist;1 person;verbal cues required  -     Functional Mobility- Device walker, front-wheeled  -     Functional Mobility-Distance (Feet) --   distance  -     Functional Mobility- Comment Unsteady gait  -       Row Name 11/04/24 1145          Activities of Daily Living    BADL Assessment/Intervention other (see comments)  Declined ADLs  -               User Key  (r) = Recorded By, (t) = Taken By, (c) = Cosigned By      Initials Name Provider Type    Shania Ruiz OT Occupational Therapist                   Obj/Interventions       Row Name 11/04/24 1147          Balance    Balance Assessment sitting static balance;sitting dynamic balance;sit to stand dynamic balance;standing static balance;standing dynamic balance  -     Static Sitting Balance standby assist  -     Dynamic Sitting Balance standby assist  -     Position, Sitting Balance unsupported;sitting in chair  -RENE     Static Standing Balance contact guard  -     Dynamic Standing Balance contact guard  -     Position/Device Used, Standing Balance supported;walker, front-wheeled  -     Balance  Interventions sitting;standing;sit to stand;supported;static;dynamic  -               User Key  (r) = Recorded By, (t) = Taken By, (c) = Cosigned By      Initials Name Provider Type    Shania Ruiz OT Occupational Therapist                   Goals/Plan       Row Name 11/04/24 1309          Transfer Goal 1 (OT)    Progress/Outcome (Transfer Goal 1, OT) goal ongoing  -       Row Name 11/04/24 1309          Dressing Goal 1 (OT)    Progress/Outcome (Dressing Goal 1, OT) goal ongoing  -       Row Name 11/04/24 1309          Grooming Goal 1 (OT)    Progress/Outcome (Grooming Goal 1, OT) goal ongoing  -       Row Name 11/04/24 1309          ROM Goal 1 (OT)    Progress/Outcome (ROM Goal 1, OT) continuing progress toward goal;goal ongoing  -               User Key  (r) = Recorded By, (t) = Taken By, (c) = Cosigned By      Initials Name Provider Type    Shania Ruiz OT Occupational Therapist                   Clinical Impression       Row Name 11/04/24 1147          Pain Assessment    Pretreatment Pain Rating 4/10  -     Posttreatment Pain Rating 4/10  -     Pain Location shoulder  -     Pain Side/Orientation left  -     Pain Management Interventions activity modification encouraged;heat applied;nursing notified;positioning techniques utilized  -     Response to Pain Interventions activity participation with tolerable pain  -       Row Name 11/04/24 1147          Plan of Care Review    Plan of Care Reviewed With patient  -     Progress improving  -     Outcome Evaluation Pt presents below fxl baseline with decreased activity tolerance, generalized weakness, and impaired balance with mobility. Pt shows improvement in cognition this session from previous session based on chart review. Pt would benefit from continued OT services to progress to PLOF. Recommend d/c to IRF.  -       Row Name 11/04/24 1147          Therapy Plan Review/Discharge Plan (OT)    Anticipated Discharge Disposition  (OT) inpatient rehabilitation facility  -       Row Name 11/04/24 1147          Vital Signs    Pre Patient Position Sitting  -     Intra Patient Position Standing  -     Post Patient Position Sitting  -       Row Name 11/04/24 1147          Positioning and Restraints    Pre-Treatment Position sitting in chair/recliner  -     Post Treatment Position chair  -AJ     In Chair notified nsg;reclined;sitting;call light within reach;encouraged to call for assist;exit alarm on;waffle cushion;legs elevated  -               User Key  (r) = Recorded By, (t) = Taken By, (c) = Cosigned By      Initials Name Provider Type    Shania Ruiz OT Occupational Therapist                   Outcome Measures       Row Name 11/04/24 1307          How much help from another is currently needed...    Putting on and taking off regular lower body clothing? 2  -AJ     Bathing (including washing, rinsing, and drying) 2  -AJ     Toileting (which includes using toilet bed pan or urinal) 3  -AJ     Putting on and taking off regular upper body clothing 3  -AJ     Taking care of personal grooming (such as brushing teeth) 3  -AJ     Eating meals 4  -AJ     AM-PAC 6 Clicks Score (OT) 17  -       Row Name 11/04/24 1307          Functional Assessment    Outcome Measure Options AM-PAC 6 Clicks Daily Activity (OT)  -               User Key  (r) = Recorded By, (t) = Taken By, (c) = Cosigned By      Initials Name Provider Type    Shania Ruiz OT Occupational Therapist                    Occupational Therapy Education       Title: PT OT SLP Therapies (Done)       Topic: Occupational Therapy (Done)       Point: ADL training (Done)       Description:   Instruct learner(s) on proper safety adaptation and remediation techniques during self care or transfers.   Instruct in proper use of assistive devices.                  Learning Progress Summary            Patient Acceptance, E, VU by RENE at 11/4/2024 1307    Acceptance, E,D, NR by FAM  at 11/1/2024 1208    Comment: Re-orientation, transfer, BADL and walker safety and sequencing, UE TE    Acceptance, E,D, NR,VU by FAM at 10/26/2024 1413    Comment: reason for consult, noted deficits, benefit of activity, walker safety                      Point: Home exercise program (Resolved)       Description:   Instruct learner(s) on appropriate technique for monitoring, assisting and/or progressing therapeutic exercises/activities.                  Learning Progress Summary            Patient Acceptance, E,D, NR by FAM at 11/1/2024 1208    Comment: Re-orientation, transfer, BADL and walker safety and sequencing, UE TE    Acceptance, E, NL by  at 10/29/2024 1225    Comment: therex                      Point: Precautions (Done)       Description:   Instruct learner(s) on prescribed precautions during self-care and functional transfers.                  Learning Progress Summary            Patient Acceptance, E, VU by RENE at 11/4/2024 1307    Acceptance, E,D, NR by FAM at 11/1/2024 1208    Comment: Re-orientation, transfer, BADL and walker safety and sequencing, UE TE    Acceptance, E,D, NR,VU by FAM at 10/26/2024 1413    Comment: reason for consult, noted deficits, benefit of activity, walker safety                      Point: Body mechanics (Done)       Description:   Instruct learner(s) on proper positioning and spine alignment during self-care, functional mobility activities and/or exercises.                  Learning Progress Summary            Patient Acceptance, E, VU by RENE at 11/4/2024 1307    Acceptance, E,D, NR,VU by FAM at 10/26/2024 1413    Comment: reason for consult, noted deficits, benefit of activity, walker safety                                      User Key       Initials Effective Dates Name Provider Type Discipline    FAM 07/11/23 -  Kerry Mayo, OT Occupational Therapist OT     02/03/23 -  Jenni Slater OT Occupational Therapist OT    RENE 08/26/24 -  Shania Arthur, OT Occupational  Therapist OT                  OT Recommendation and Plan     Plan of Care Review  Plan of Care Reviewed With: patient  Progress: improving  Outcome Evaluation: Pt presents below fxl baseline with decreased activity tolerance, generalized weakness, and impaired balance with mobility. Pt shows improvement in cognition this session from previous session based on chart review. Pt would benefit from continued OT services to progress to PLOF. Recommend d/c to IRF.     Time Calculation:         Time Calculation- OT       Row Name 11/04/24 1308             Time Calculation- OT    OT Start Time 1115  -AJ      OT Received On 11/04/24  -      OT Goal Re-Cert Due Date 11/14/24  -         Timed Charges    35299 - OT Therapeutic Activity Minutes 26  -AJ         Total Minutes    Timed Charges Total Minutes 26  -AJ       Total Minutes 26  -AJ                User Key  (r) = Recorded By, (t) = Taken By, (c) = Cosigned By      Initials Name Provider Type    Shania Ruiz OT Occupational Therapist                  Therapy Charges for Today       Code Description Service Date Service Provider Modifiers Qty    [id number] HC OT THERAPEUTIC ACT EA 15 MIN 11/4/2024 Shania Arthur OT GO 2                 Shania Arthur OT  11/4/2024

## 2024-11-04 NOTE — PROGRESS NOTES
"          Clinical Nutrition Assessment     Patient Name: Monique Betts  YOB: 1935  MRN: [id number]  Date of Encounter: 11/04/24 10:56 EST  Admission date: 10/25/2024  Reason for Visit: LOS    Assessment   Nutrition Assessment   Admission Diagnosis:  UTI (urinary tract infection) [N39.0]    Problem List:    Failure to thrive in adult    Coronary artery disease involving native coronary artery of native heart without angina pectoris    Dyslipidemia, goal LDL below 70    Essential hypertension    UTI (urinary tract infection)    Cognitive decline    GERD without esophagitis      PMH:   She  has a past medical history of Cancer, Heart murmur (2000), and Sleep apnea (1999).    PSH:  She  has a past surgical history that includes Cardiac catheterization (N/A, 06/08/2022).    Applicable Nutrition History:       Anthropometrics     Height: Height: 162.6 cm (64\")  Last Filed Weight: Weight: 68.9 kg (152 lb) (10/25/24 1618)  Method: Weight Method: Stated  BMI: BMI (Calculated): 26.1    UBW:  144lbs per EMR  Weight change: No significant changes -limited wt hx to assess    Nutrition Focused Physical Exam    Date: 11/4    Unable to perform due to Defer pending indication     Subjective   Reported/Observed/Food/Nutrition Related History:     Pt up in chair at bedside, states she has been eating at baseline. Pt states she is able to eat ~50% of tray and notes the portions are large. RD obtained dinner preferences and communicated to kitchen. Pt denies further nutrition needs at this time.    Current Nutrition Prescription   PO: Diet: Cardiac; Healthy Heart (2-3 Na+); Fluid Consistency: Thin (IDDSI 0)  Oral Nutrition Supplement:   Intake: Insufficient data- 63% x 4 meals documented    Assessment & Plan   Nutrition Diagnosis   Date:  11/4            Updated:    Problem No nutrition diagnosis at this time    Etiology    Signs/Symptoms    Status: New    Goal / Objectives:   Maintain nutrition and Maintain " intake      Nutrition Intervention      Follow treatment progress, Care plan reviewed, Interview for preferences      Monitoring/Evaluation:   Per protocol, PO intake    Neelam Mariano, MS,RD,LD  Time Spent:15

## 2025-02-12 ENCOUNTER — APPOINTMENT (OUTPATIENT)
Dept: GENERAL RADIOLOGY | Facility: HOSPITAL | Age: 89
DRG: 884 | End: 2025-02-12
Payer: MEDICARE

## 2025-02-12 ENCOUNTER — HOSPITAL ENCOUNTER (INPATIENT)
Facility: HOSPITAL | Age: 89
LOS: 5 days | Discharge: HOME HEALTH | DRG: 884 | End: 2025-02-17
Attending: STUDENT IN AN ORGANIZED HEALTH CARE EDUCATION/TRAINING PROGRAM | Admitting: FAMILY MEDICINE
Payer: MEDICARE

## 2025-02-12 ENCOUNTER — APPOINTMENT (OUTPATIENT)
Dept: CT IMAGING | Facility: HOSPITAL | Age: 89
DRG: 884 | End: 2025-02-12
Payer: MEDICARE

## 2025-02-12 DIAGNOSIS — R62.7 FAILURE TO THRIVE IN ADULT: ICD-10-CM

## 2025-02-12 DIAGNOSIS — I10 ESSENTIAL HYPERTENSION: ICD-10-CM

## 2025-02-12 DIAGNOSIS — I21.4 NSTEMI (NON-ST ELEVATED MYOCARDIAL INFARCTION): ICD-10-CM

## 2025-02-12 DIAGNOSIS — R10.32 LEFT LOWER QUADRANT ABDOMINAL PAIN: ICD-10-CM

## 2025-02-12 DIAGNOSIS — K85.90 ACUTE PANCREATITIS WITHOUT INFECTION OR NECROSIS, UNSPECIFIED PANCREATITIS TYPE: Primary | ICD-10-CM

## 2025-02-12 DIAGNOSIS — R10.9 ABDOMINAL PAIN, UNSPECIFIED ABDOMINAL LOCATION: ICD-10-CM

## 2025-02-12 DIAGNOSIS — U07.1 COVID-19 VIRUS INFECTION: ICD-10-CM

## 2025-02-12 DIAGNOSIS — R74.8 ELEVATED LIPASE: ICD-10-CM

## 2025-02-12 DIAGNOSIS — E78.5 DYSLIPIDEMIA, GOAL LDL BELOW 70: ICD-10-CM

## 2025-02-12 DIAGNOSIS — F03.90 DEMENTIA WITHOUT BEHAVIORAL DISTURBANCE: ICD-10-CM

## 2025-02-12 DIAGNOSIS — G93.40 ENCEPHALOPATHY ACUTE: ICD-10-CM

## 2025-02-12 DIAGNOSIS — E43 SEVERE PROTEIN-CALORIE MALNUTRITION: ICD-10-CM

## 2025-02-12 DIAGNOSIS — R44.3 HALLUCINATION: ICD-10-CM

## 2025-02-12 DIAGNOSIS — I25.10 CORONARY ARTERY DISEASE INVOLVING NATIVE CORONARY ARTERY OF NATIVE HEART WITHOUT ANGINA PECTORIS: ICD-10-CM

## 2025-02-12 DIAGNOSIS — F03.911 DEMENTIA WITH AGITATION, UNSPECIFIED DEMENTIA SEVERITY, UNSPECIFIED DEMENTIA TYPE: ICD-10-CM

## 2025-02-12 DIAGNOSIS — R41.89 COGNITIVE DECLINE: ICD-10-CM

## 2025-02-12 DIAGNOSIS — R41.0 DELIRIUM: ICD-10-CM

## 2025-02-12 DIAGNOSIS — K21.9 GERD WITHOUT ESOPHAGITIS: ICD-10-CM

## 2025-02-12 DIAGNOSIS — Z60.9 SOCIAL PROBLEM: ICD-10-CM

## 2025-02-12 PROBLEM — E46 PROTEIN CALORIE MALNUTRITION: Status: ACTIVE | Noted: 2025-02-12

## 2025-02-12 LAB
ALBUMIN SERPL-MCNC: 4.3 G/DL (ref 3.5–5.2)
ALBUMIN/GLOB SERPL: 1.7 G/DL
ALP SERPL-CCNC: 62 U/L (ref 39–117)
ALT SERPL W P-5'-P-CCNC: 16 U/L (ref 1–33)
AMPHET+METHAMPHET UR QL: NEGATIVE
AMPHETAMINES UR QL: NEGATIVE
ANION GAP SERPL CALCULATED.3IONS-SCNC: 12 MMOL/L (ref 5–15)
APAP SERPL-MCNC: <5 MCG/ML (ref 0–30)
ARTERIAL PATENCY WRIST A: ABNORMAL
AST SERPL-CCNC: 19 U/L (ref 1–32)
ATMOSPHERIC PRESS: ABNORMAL MM[HG]
BARBITURATES UR QL SCN: NEGATIVE
BASE EXCESS BLDA CALC-SCNC: 2.3 MMOL/L (ref 0–2)
BASOPHILS # BLD AUTO: 0.03 10*3/MM3 (ref 0–0.2)
BASOPHILS NFR BLD AUTO: 0.6 % (ref 0–1.5)
BDY SITE: ABNORMAL
BENZODIAZ UR QL SCN: NEGATIVE
BILIRUB SERPL-MCNC: 0.4 MG/DL (ref 0–1.2)
BILIRUB UR QL STRIP: NEGATIVE
BODY TEMPERATURE: 37
BUN SERPL-MCNC: 15 MG/DL (ref 8–23)
BUN/CREAT SERPL: 14.9 (ref 7–25)
BUPRENORPHINE SERPL-MCNC: NEGATIVE NG/ML
CALCIUM SPEC-SCNC: 9.4 MG/DL (ref 8.6–10.5)
CANNABINOIDS SERPL QL: NEGATIVE
CHLORIDE SERPL-SCNC: 104 MMOL/L (ref 98–107)
CHOLEST SERPL-MCNC: 147 MG/DL (ref 0–200)
CK SERPL-CCNC: 60 U/L (ref 20–180)
CLARITY UR: CLEAR
CO2 BLDA-SCNC: 28.2 MMOL/L (ref 22–33)
CO2 SERPL-SCNC: 25 MMOL/L (ref 22–29)
COCAINE UR QL: NEGATIVE
COHGB MFR BLD: 1.3 % (ref 0–2)
COLOR UR: YELLOW
CREAT SERPL-MCNC: 1.01 MG/DL (ref 0.57–1)
CRP SERPL-MCNC: <0.3 MG/DL (ref 0–0.5)
D-LACTATE SERPL-SCNC: 0.9 MMOL/L (ref 0.5–2)
DEPRECATED RDW RBC AUTO: 43.8 FL (ref 37–54)
EGFRCR SERPLBLD CKD-EPI 2021: 53.7 ML/MIN/1.73
EOSINOPHIL # BLD AUTO: 0.04 10*3/MM3 (ref 0–0.4)
EOSINOPHIL NFR BLD AUTO: 0.8 % (ref 0.3–6.2)
EPAP: 0
ERYTHROCYTE [DISTWIDTH] IN BLOOD BY AUTOMATED COUNT: 12.7 % (ref 12.3–15.4)
ETHANOL BLD-MCNC: <10 MG/DL (ref 0–10)
FENTANYL UR-MCNC: NEGATIVE NG/ML
FLUAV RNA RESP QL NAA+PROBE: NOT DETECTED
FLUBV RNA RESP QL NAA+PROBE: NOT DETECTED
GEN 5 1HR TROPONIN T REFLEX: 20 NG/L
GLOBULIN UR ELPH-MCNC: 2.5 GM/DL
GLUCOSE SERPL-MCNC: 130 MG/DL (ref 65–99)
GLUCOSE UR STRIP-MCNC: NEGATIVE MG/DL
HBA1C MFR BLD: 6 % (ref 4.8–5.6)
HCO3 BLDA-SCNC: 26.9 MMOL/L (ref 20–26)
HCT VFR BLD AUTO: 40 % (ref 34–46.6)
HCT VFR BLD CALC: 41 % (ref 38–51)
HDLC SERPL-MCNC: 61 MG/DL (ref 40–60)
HGB BLD-MCNC: 13.5 G/DL (ref 12–15.9)
HGB BLDA-MCNC: 13.4 G/DL (ref 14–18)
HGB UR QL STRIP.AUTO: NEGATIVE
IMM GRANULOCYTES # BLD AUTO: 0.01 10*3/MM3 (ref 0–0.05)
IMM GRANULOCYTES NFR BLD AUTO: 0.2 % (ref 0–0.5)
INHALED O2 CONCENTRATION: 21 %
INR PPP: 0.93 (ref 0.89–1.12)
IPAP: 0
KETONES UR QL STRIP: ABNORMAL
LDH SERPL-CCNC: 198 U/L (ref 135–214)
LDLC SERPL CALC-MCNC: 65 MG/DL (ref 0–100)
LDLC/HDLC SERPL: 1.03 {RATIO}
LEUKOCYTE ESTERASE UR QL STRIP.AUTO: NEGATIVE
LIPASE SERPL-CCNC: 285 U/L (ref 13–60)
LYMPHOCYTES # BLD AUTO: 1.77 10*3/MM3 (ref 0.7–3.1)
LYMPHOCYTES NFR BLD AUTO: 36.5 % (ref 19.6–45.3)
MAGNESIUM SERPL-MCNC: 2.2 MG/DL (ref 1.6–2.4)
MCH RBC QN AUTO: 31.3 PG (ref 26.6–33)
MCHC RBC AUTO-ENTMCNC: 33.8 G/DL (ref 31.5–35.7)
MCV RBC AUTO: 92.8 FL (ref 79–97)
METHADONE UR QL SCN: NEGATIVE
METHGB BLD QL: 0.3 % (ref 0–1.5)
MODALITY: ABNORMAL
MONOCYTES # BLD AUTO: 0.47 10*3/MM3 (ref 0.1–0.9)
MONOCYTES NFR BLD AUTO: 9.7 % (ref 5–12)
NEUTROPHILS NFR BLD AUTO: 2.53 10*3/MM3 (ref 1.7–7)
NEUTROPHILS NFR BLD AUTO: 52.2 % (ref 42.7–76)
NITRITE UR QL STRIP: NEGATIVE
NRBC BLD AUTO-RTO: 0 /100 WBC (ref 0–0.2)
NT-PROBNP SERPL-MCNC: 359.2 PG/ML (ref 0–1800)
OPIATES UR QL: NEGATIVE
OXYCODONE UR QL SCN: NEGATIVE
OXYHGB MFR BLDV: 94.9 % (ref 94–99)
PAW @ PEAK INSP FLOW SETTING VENT: 0 CMH2O
PCO2 BLDA: 40.9 MM HG (ref 35–45)
PCO2 TEMP ADJ BLD: 40.9 MM HG (ref 35–45)
PCP UR QL SCN: NEGATIVE
PH BLDA: 7.43 PH UNITS (ref 7.35–7.45)
PH UR STRIP.AUTO: <=5 [PH] (ref 5–8)
PH, TEMP CORRECTED: 7.43 PH UNITS
PHOSPHATE SERPL-MCNC: 3.3 MG/DL (ref 2.5–4.5)
PLATELET # BLD AUTO: 147 10*3/MM3 (ref 140–450)
PMV BLD AUTO: 11.5 FL (ref 6–12)
PO2 BLDA: 75.6 MM HG (ref 83–108)
PO2 TEMP ADJ BLD: 75.6 MM HG (ref 83–108)
POTASSIUM SERPL-SCNC: 3.9 MMOL/L (ref 3.5–5.2)
PROCALCITONIN SERPL-MCNC: 0.04 NG/ML (ref 0–0.25)
PROT SERPL-MCNC: 6.8 G/DL (ref 6–8.5)
PROT UR QL STRIP: NEGATIVE
PROTHROMBIN TIME: 12.6 SECONDS (ref 12.2–14.5)
QT INTERVAL: 492 MS
QTC INTERVAL: 470 MS
RBC # BLD AUTO: 4.31 10*6/MM3 (ref 3.77–5.28)
RSV RNA RESP QL NAA+PROBE: NOT DETECTED
SALICYLATES SERPL-MCNC: <0.3 MG/DL
SARS-COV-2 RNA RESP QL NAA+PROBE: NOT DETECTED
SODIUM SERPL-SCNC: 141 MMOL/L (ref 136–145)
SP GR UR STRIP: 1.02 (ref 1–1.03)
TOTAL RATE: 0 BREATHS/MINUTE
TRICYCLICS UR QL SCN: NEGATIVE
TRIGL SERPL-MCNC: 117 MG/DL (ref 0–150)
TROPONIN T % DELTA: 0
TROPONIN T NUMERIC DELTA: 0 NG/L
TROPONIN T SERPL HS-MCNC: 20 NG/L
TSH SERPL DL<=0.05 MIU/L-ACNC: 2.45 UIU/ML (ref 0.27–4.2)
UROBILINOGEN UR QL STRIP: ABNORMAL
VLDLC SERPL-MCNC: 21 MG/DL (ref 5–40)
WBC NRBC COR # BLD AUTO: 4.85 10*3/MM3 (ref 3.4–10.8)

## 2025-02-12 PROCEDURE — 25510000001 IOPAMIDOL 61 % SOLUTION: Performed by: STUDENT IN AN ORGANIZED HEALTH CARE EDUCATION/TRAINING PROGRAM

## 2025-02-12 PROCEDURE — 80307 DRUG TEST PRSMV CHEM ANLYZR: CPT | Performed by: STUDENT IN AN ORGANIZED HEALTH CARE EDUCATION/TRAINING PROGRAM

## 2025-02-12 PROCEDURE — 83036 HEMOGLOBIN GLYCOSYLATED A1C: CPT

## 2025-02-12 PROCEDURE — 70450 CT HEAD/BRAIN W/O DYE: CPT

## 2025-02-12 PROCEDURE — 82375 ASSAY CARBOXYHB QUANT: CPT

## 2025-02-12 PROCEDURE — 87040 BLOOD CULTURE FOR BACTERIA: CPT | Performed by: STUDENT IN AN ORGANIZED HEALTH CARE EDUCATION/TRAINING PROGRAM

## 2025-02-12 PROCEDURE — 36600 WITHDRAWAL OF ARTERIAL BLOOD: CPT

## 2025-02-12 PROCEDURE — 83050 HGB METHEMOGLOBIN QUAN: CPT

## 2025-02-12 PROCEDURE — 99285 EMERGENCY DEPT VISIT HI MDM: CPT

## 2025-02-12 PROCEDURE — 84100 ASSAY OF PHOSPHORUS: CPT | Performed by: STUDENT IN AN ORGANIZED HEALTH CARE EDUCATION/TRAINING PROGRAM

## 2025-02-12 PROCEDURE — 82550 ASSAY OF CK (CPK): CPT

## 2025-02-12 PROCEDURE — 84443 ASSAY THYROID STIM HORMONE: CPT | Performed by: STUDENT IN AN ORGANIZED HEALTH CARE EDUCATION/TRAINING PROGRAM

## 2025-02-12 PROCEDURE — 93005 ELECTROCARDIOGRAM TRACING: CPT | Performed by: STUDENT IN AN ORGANIZED HEALTH CARE EDUCATION/TRAINING PROGRAM

## 2025-02-12 PROCEDURE — 71045 X-RAY EXAM CHEST 1 VIEW: CPT

## 2025-02-12 PROCEDURE — 80053 COMPREHEN METABOLIC PANEL: CPT | Performed by: STUDENT IN AN ORGANIZED HEALTH CARE EDUCATION/TRAINING PROGRAM

## 2025-02-12 PROCEDURE — 25010000002 ONDANSETRON PER 1 MG: Performed by: STUDENT IN AN ORGANIZED HEALTH CARE EDUCATION/TRAINING PROGRAM

## 2025-02-12 PROCEDURE — 84145 PROCALCITONIN (PCT): CPT | Performed by: STUDENT IN AN ORGANIZED HEALTH CARE EDUCATION/TRAINING PROGRAM

## 2025-02-12 PROCEDURE — 74177 CT ABD & PELVIS W/CONTRAST: CPT

## 2025-02-12 PROCEDURE — 87637 SARSCOV2&INF A&B&RSV AMP PRB: CPT | Performed by: STUDENT IN AN ORGANIZED HEALTH CARE EDUCATION/TRAINING PROGRAM

## 2025-02-12 PROCEDURE — 82805 BLOOD GASES W/O2 SATURATION: CPT

## 2025-02-12 PROCEDURE — 25810000003 LACTATED RINGERS SOLUTION: Performed by: STUDENT IN AN ORGANIZED HEALTH CARE EDUCATION/TRAINING PROGRAM

## 2025-02-12 PROCEDURE — 80143 DRUG ASSAY ACETAMINOPHEN: CPT | Performed by: STUDENT IN AN ORGANIZED HEALTH CARE EDUCATION/TRAINING PROGRAM

## 2025-02-12 PROCEDURE — 83615 LACTATE (LD) (LDH) ENZYME: CPT

## 2025-02-12 PROCEDURE — 82077 ASSAY SPEC XCP UR&BREATH IA: CPT | Performed by: STUDENT IN AN ORGANIZED HEALTH CARE EDUCATION/TRAINING PROGRAM

## 2025-02-12 PROCEDURE — 86140 C-REACTIVE PROTEIN: CPT | Performed by: STUDENT IN AN ORGANIZED HEALTH CARE EDUCATION/TRAINING PROGRAM

## 2025-02-12 PROCEDURE — 83605 ASSAY OF LACTIC ACID: CPT | Performed by: STUDENT IN AN ORGANIZED HEALTH CARE EDUCATION/TRAINING PROGRAM

## 2025-02-12 PROCEDURE — 80179 DRUG ASSAY SALICYLATE: CPT | Performed by: STUDENT IN AN ORGANIZED HEALTH CARE EDUCATION/TRAINING PROGRAM

## 2025-02-12 PROCEDURE — 36415 COLL VENOUS BLD VENIPUNCTURE: CPT

## 2025-02-12 PROCEDURE — P9612 CATHETERIZE FOR URINE SPEC: HCPCS

## 2025-02-12 PROCEDURE — 81003 URINALYSIS AUTO W/O SCOPE: CPT | Performed by: STUDENT IN AN ORGANIZED HEALTH CARE EDUCATION/TRAINING PROGRAM

## 2025-02-12 PROCEDURE — 25010000002 ENOXAPARIN PER 10 MG

## 2025-02-12 PROCEDURE — 85610 PROTHROMBIN TIME: CPT

## 2025-02-12 PROCEDURE — 80061 LIPID PANEL: CPT | Performed by: STUDENT IN AN ORGANIZED HEALTH CARE EDUCATION/TRAINING PROGRAM

## 2025-02-12 PROCEDURE — 83735 ASSAY OF MAGNESIUM: CPT | Performed by: STUDENT IN AN ORGANIZED HEALTH CARE EDUCATION/TRAINING PROGRAM

## 2025-02-12 PROCEDURE — 85025 COMPLETE CBC W/AUTO DIFF WBC: CPT | Performed by: STUDENT IN AN ORGANIZED HEALTH CARE EDUCATION/TRAINING PROGRAM

## 2025-02-12 PROCEDURE — 84484 ASSAY OF TROPONIN QUANT: CPT | Performed by: STUDENT IN AN ORGANIZED HEALTH CARE EDUCATION/TRAINING PROGRAM

## 2025-02-12 PROCEDURE — 83690 ASSAY OF LIPASE: CPT | Performed by: STUDENT IN AN ORGANIZED HEALTH CARE EDUCATION/TRAINING PROGRAM

## 2025-02-12 PROCEDURE — 99223 1ST HOSP IP/OBS HIGH 75: CPT

## 2025-02-12 PROCEDURE — 25810000003 LACTATED RINGERS PER 1000 ML

## 2025-02-12 PROCEDURE — 83880 ASSAY OF NATRIURETIC PEPTIDE: CPT | Performed by: STUDENT IN AN ORGANIZED HEALTH CARE EDUCATION/TRAINING PROGRAM

## 2025-02-12 RX ORDER — DONEPEZIL HYDROCHLORIDE 10 MG/1
10 TABLET, FILM COATED ORAL DAILY
Status: DISCONTINUED | OUTPATIENT
Start: 2025-02-12 | End: 2025-02-17 | Stop reason: HOSPADM

## 2025-02-12 RX ORDER — SODIUM CHLORIDE 0.9 % (FLUSH) 0.9 %
10 SYRINGE (ML) INJECTION EVERY 12 HOURS SCHEDULED
Status: DISCONTINUED | OUTPATIENT
Start: 2025-02-12 | End: 2025-02-17 | Stop reason: HOSPADM

## 2025-02-12 RX ORDER — PREGABALIN 50 MG/1
50 CAPSULE ORAL 3 TIMES DAILY
Status: DISCONTINUED | OUTPATIENT
Start: 2025-02-12 | End: 2025-02-17 | Stop reason: HOSPADM

## 2025-02-12 RX ORDER — IOPAMIDOL 612 MG/ML
85 INJECTION, SOLUTION INTRAVASCULAR
Status: COMPLETED | OUTPATIENT
Start: 2025-02-12 | End: 2025-02-12

## 2025-02-12 RX ORDER — SODIUM CHLORIDE 9 MG/ML
40 INJECTION, SOLUTION INTRAVENOUS AS NEEDED
Status: DISCONTINUED | OUTPATIENT
Start: 2025-02-12 | End: 2025-02-17 | Stop reason: HOSPADM

## 2025-02-12 RX ORDER — BISACODYL 5 MG/1
5 TABLET, DELAYED RELEASE ORAL DAILY PRN
Status: DISCONTINUED | OUTPATIENT
Start: 2025-02-12 | End: 2025-02-17 | Stop reason: HOSPADM

## 2025-02-12 RX ORDER — ROSUVASTATIN CALCIUM 10 MG/1
10 TABLET, COATED ORAL DAILY
Status: DISCONTINUED | OUTPATIENT
Start: 2025-02-12 | End: 2025-02-17 | Stop reason: HOSPADM

## 2025-02-12 RX ORDER — ONDANSETRON 2 MG/ML
4 INJECTION INTRAMUSCULAR; INTRAVENOUS ONCE
Status: COMPLETED | OUTPATIENT
Start: 2025-02-12 | End: 2025-02-12

## 2025-02-12 RX ORDER — QUETIAPINE FUMARATE 25 MG/1
25 TABLET, FILM COATED ORAL NIGHTLY
Status: DISCONTINUED | OUTPATIENT
Start: 2025-02-12 | End: 2025-02-17 | Stop reason: HOSPADM

## 2025-02-12 RX ORDER — BACLOFEN 10 MG/1
10 TABLET ORAL 3 TIMES DAILY PRN
COMMUNITY
End: 2025-02-17 | Stop reason: HOSPADM

## 2025-02-12 RX ORDER — SIMETHICONE 80 MG
80 TABLET,CHEWABLE ORAL 2 TIMES DAILY
Status: DISCONTINUED | OUTPATIENT
Start: 2025-02-12 | End: 2025-02-17 | Stop reason: HOSPADM

## 2025-02-12 RX ORDER — LOSARTAN POTASSIUM 50 MG/1
25 TABLET ORAL
Status: DISCONTINUED | OUTPATIENT
Start: 2025-02-12 | End: 2025-02-17 | Stop reason: HOSPADM

## 2025-02-12 RX ORDER — FLUTICASONE PROPIONATE 50 MCG
2 SPRAY, SUSPENSION (ML) NASAL DAILY PRN
Status: DISCONTINUED | OUTPATIENT
Start: 2025-02-12 | End: 2025-02-16 | Stop reason: SDUPTHER

## 2025-02-12 RX ORDER — MECLIZINE HYDROCHLORIDE 25 MG/1
12.5 TABLET ORAL 2 TIMES DAILY PRN
Status: DISCONTINUED | OUTPATIENT
Start: 2025-02-12 | End: 2025-02-17 | Stop reason: HOSPADM

## 2025-02-12 RX ORDER — SODIUM CHLORIDE, SODIUM LACTATE, POTASSIUM CHLORIDE, CALCIUM CHLORIDE 600; 310; 30; 20 MG/100ML; MG/100ML; MG/100ML; MG/100ML
100 INJECTION, SOLUTION INTRAVENOUS CONTINUOUS
Status: ACTIVE | OUTPATIENT
Start: 2025-02-12 | End: 2025-02-13

## 2025-02-12 RX ORDER — ACETAMINOPHEN 650 MG/1
650 SUPPOSITORY RECTAL EVERY 4 HOURS PRN
Status: DISCONTINUED | OUTPATIENT
Start: 2025-02-12 | End: 2025-02-17 | Stop reason: HOSPADM

## 2025-02-12 RX ORDER — SODIUM CHLORIDE 0.9 % (FLUSH) 0.9 %
10 SYRINGE (ML) INJECTION AS NEEDED
Status: DISCONTINUED | OUTPATIENT
Start: 2025-02-12 | End: 2025-02-17 | Stop reason: HOSPADM

## 2025-02-12 RX ORDER — BISACODYL 10 MG
10 SUPPOSITORY, RECTAL RECTAL DAILY PRN
Status: DISCONTINUED | OUTPATIENT
Start: 2025-02-12 | End: 2025-02-17 | Stop reason: HOSPADM

## 2025-02-12 RX ORDER — ASPIRIN 81 MG/1
81 TABLET, CHEWABLE ORAL DAILY
Status: DISCONTINUED | OUTPATIENT
Start: 2025-02-12 | End: 2025-02-17 | Stop reason: HOSPADM

## 2025-02-12 RX ORDER — NITROGLYCERIN 0.4 MG/1
0.4 TABLET SUBLINGUAL
Status: DISCONTINUED | OUTPATIENT
Start: 2025-02-12 | End: 2025-02-17 | Stop reason: HOSPADM

## 2025-02-12 RX ORDER — ACETAMINOPHEN 325 MG/1
650 TABLET ORAL EVERY 4 HOURS PRN
Status: DISCONTINUED | OUTPATIENT
Start: 2025-02-12 | End: 2025-02-17 | Stop reason: HOSPADM

## 2025-02-12 RX ORDER — ENOXAPARIN SODIUM 100 MG/ML
40 INJECTION SUBCUTANEOUS DAILY
Status: DISCONTINUED | OUTPATIENT
Start: 2025-02-12 | End: 2025-02-17 | Stop reason: HOSPADM

## 2025-02-12 RX ORDER — AMOXICILLIN 250 MG
2 CAPSULE ORAL 2 TIMES DAILY PRN
Status: DISCONTINUED | OUTPATIENT
Start: 2025-02-12 | End: 2025-02-17 | Stop reason: HOSPADM

## 2025-02-12 RX ORDER — ACETAMINOPHEN 160 MG/5ML
650 SOLUTION ORAL EVERY 4 HOURS PRN
Status: DISCONTINUED | OUTPATIENT
Start: 2025-02-12 | End: 2025-02-17 | Stop reason: HOSPADM

## 2025-02-12 RX ORDER — POLYETHYLENE GLYCOL 3350 17 G/17G
17 POWDER, FOR SOLUTION ORAL DAILY PRN
Status: DISCONTINUED | OUTPATIENT
Start: 2025-02-12 | End: 2025-02-17 | Stop reason: HOSPADM

## 2025-02-12 RX ORDER — ONDANSETRON 4 MG/1
4 TABLET, ORALLY DISINTEGRATING ORAL EVERY 6 HOURS PRN
Status: DISCONTINUED | OUTPATIENT
Start: 2025-02-12 | End: 2025-02-17 | Stop reason: HOSPADM

## 2025-02-12 RX ADMIN — Medication 10 ML: at 21:09

## 2025-02-12 RX ADMIN — SODIUM CHLORIDE, SODIUM LACTATE, POTASSIUM CHLORIDE, CALCIUM CHLORIDE 1000 ML: 20; 30; 600; 310 INJECTION, SOLUTION INTRAVENOUS at 07:22

## 2025-02-12 RX ADMIN — PREGABALIN 50 MG: 50 CAPSULE ORAL at 16:59

## 2025-02-12 RX ADMIN — PREGABALIN 50 MG: 50 CAPSULE ORAL at 21:08

## 2025-02-12 RX ADMIN — SODIUM CHLORIDE, SODIUM LACTATE, POTASSIUM CHLORIDE, CALCIUM CHLORIDE 100 ML/HR: 20; 30; 600; 310 INJECTION, SOLUTION INTRAVENOUS at 16:07

## 2025-02-12 RX ADMIN — IOPAMIDOL 85 ML: 612 INJECTION, SOLUTION INTRAVENOUS at 08:13

## 2025-02-12 RX ADMIN — ENOXAPARIN SODIUM 40 MG: 100 INJECTION SUBCUTANEOUS at 16:58

## 2025-02-12 RX ADMIN — Medication 5 MG: at 21:08

## 2025-02-12 RX ADMIN — LOSARTAN POTASSIUM 25 MG: 50 TABLET, FILM COATED ORAL at 17:01

## 2025-02-12 RX ADMIN — ASPIRIN 81 MG 81 MG: 81 TABLET ORAL at 17:00

## 2025-02-12 RX ADMIN — DONEPEZIL HYDROCHLORIDE 10 MG: 10 TABLET, FILM COATED ORAL at 17:00

## 2025-02-12 RX ADMIN — ACETAMINOPHEN 650 MG: 325 TABLET ORAL at 21:08

## 2025-02-12 RX ADMIN — SIMETHICONE 80 MG: 80 TABLET, CHEWABLE ORAL at 21:08

## 2025-02-12 RX ADMIN — ONDANSETRON 4 MG: 2 INJECTION INTRAMUSCULAR; INTRAVENOUS at 07:23

## 2025-02-12 RX ADMIN — QUETIAPINE FUMARATE 25 MG: 25 TABLET ORAL at 21:08

## 2025-02-12 SDOH — SOCIAL STABILITY - SOCIAL INSECURITY: PROBLEM RELATED TO SOCIAL ENVIRONMENT, UNSPECIFIED: Z60.9

## 2025-02-12 NOTE — PLAN OF CARE
Goal Outcome Evaluation:              Outcome Evaluation: Patient admitted to this unit alert and oriented x4 able to voice wants and needs to staff, denies pain at this time. Denies Hallucination or delirium after being admitted to the hospital. LR was started without any problems. Skin dry and intact, no signs of pressure areas noted. Alleveyn drsgings applied to coccyx and augustina heels for protection. Has alarms on and in place with call light within reach.

## 2025-02-12 NOTE — CASE MANAGEMENT/SOCIAL WORK
Discharge Planning Assessment  [facility]     Patient Name: Monique Betts  MRN: [id number]  Today's Date: 2/12/2025    Admit Date: 2/12/2025    Plan: Home   Discharge Needs Assessment       Row Name 02/12/25 1058       Living Environment    People in Home alone    Current Living Arrangements home    Primary Care Provided by self    Provides Primary Care For no one    Family Caregiver if Needed child(milena), adult;other (see comments)    Family Caregiver Names Vivek Betts Jr, son; Patient also has [facility] services    Quality of Family Relationships helpful;involved;supportive    Able to Return to Prior Arrangements yes       Transition Planning    Patient/Family Anticipates Transition to home    Patient/Family Anticipated Services at Transition        Discharge Needs Assessment    Equipment Currently Used at Home none    Concerns to be Addressed denies needs/concerns at this time    Discharge Coordination/Progress Lives in a house in [place] alone, can call on son Vivek if needed.  Also has [facility] services.  No DME or history of home health.  Has an advanced directive.                   Discharge Plan       Row Name 02/12/25 1100       Plan    Plan Home    Patient/Family in Agreement with Plan yes    Plan Comments I spoke with Ms Betts and her son Vivek at bedside.  Ms Betts resides in a house in [place] alone, but can call on Bill or her grand daughter Iliana if she needs any assistance.  She also has [facility] services.  She does not use any DME and has not had home health in the past.  She does have an advanced directive.  Her insurance is Medicare with [employer] as secondary.  There have not been any issues or lapses in coverage, and her insurance helps with prescription costs. Her PCP is Sena Lomeli, and she gets her prescriptions filled at [facility] off of [facility].  At this time there are no discharge needs.    Final Discharge Disposition Code 01 - home or self-care                   Continued Care and Services - Admitted Since 2/12/2025    No active coordination exists for this encounter.          Demographic Summary    No documentation.                  Functional Status       Row Name 02/12/25 1056       Functional Status    Usual Activity Tolerance good    Current Activity Tolerance moderate       Functional Status, IADL    Medications independent    Meal Preparation independent    Housekeeping independent    Laundry independent    Shopping independent       Mental Status    General Appearance WDL WDL                   Psychosocial    No documentation.                  Abuse/Neglect    No documentation.                  Legal    No documentation.                  Substance Abuse    No documentation.                  Patient Forms    No documentation.                     Brandy Nunez RN

## 2025-02-12 NOTE — ED NOTES
Monique Betts    Nursing Report ED to Floor:  Mental status: A&oX4  Ambulatory status: UPX2  Oxygen Therapy:  RA  Cardiac Rhythm: SINUS REGGIE  Admitted from: HOME  Safety Concerns:  NONE  Precautions: FALL RISK  Social Issues: NONE  ED Room #:  20    ED Nurse Phone Extension - [phone] or may call [phone].      HPI:   Chief Complaint   Patient presents with    Multiple Complaints       Past Medical History:  Past Medical History:   Diagnosis Date    Cancer     Heart murmur 2000    Skin Cancer    Sleep apnea 1999    Not using machine for several years.        Past Surgical History:  Past Surgical History:   Procedure Laterality Date    CARDIAC CATHETERIZATION N/A 06/08/2022    Procedure: LEFT HEART CATH;  Surgeon: Antonino Koo MD;  Location: [facility] CATH INVASIVE LOCATION;  Service: Cardiovascular;  Laterality: N/A;        Admitting Doctor:   Marlyn Trinh MD    Consulting Provider(s):  Consults       No orders found from 1/14/2025 to 2/13/2025.             Admitting Diagnosis:   The primary encounter diagnosis was Acute pancreatitis without infection or necrosis, unspecified pancreatitis type. Diagnoses of Encephalopathy acute, Dementia with agitation, unspecified dementia severity, unspecified dementia type, Essential hypertension, Cognitive decline, Failure to thrive in adult, and Coronary artery disease involving native coronary artery of native heart without angina pectoris were also pertinent to this visit.    Most Recent Vitals:   Vitals:    02/12/25 0830 02/12/25 0900 02/12/25 1000 02/12/25 1200   BP: 165/62 157/65 166/67 162/67   BP Location:       Patient Position:       Pulse: 52 51 51 51   Resp:       Temp:       TempSrc:       SpO2: 99% 95% 96% 96%   Weight:       Height:           Active LDAs/IV Access:   Lines, Drains & Airways       Active LDAs       Name Placement date Placement time Site Days    Peripheral IV 02/12/25 0637 Left Antecubital 02/12/25  0637  Antecubital  less than 1    Peripheral  IV 02/12/25 0718 Anterior;Right Forearm 02/12/25  0718  Forearm  less than 1                    Labs (abnormal labs have a star):   Labs Reviewed   COMPREHENSIVE METABOLIC PANEL - Abnormal; Notable for the following components:       Result Value    Glucose 130 (*)     Creatinine 1.01 (*)     eGFR 53.7 (*)     All other components within normal limits    Narrative:     GFR Categories in Chronic Kidney Disease (CKD)      GFR Category          GFR (mL/min/1.73)    Interpretation  G1                     90 or greater         Normal or high (1)  G2                      60-89                Mild decrease (1)  G3a                   45-59                Mild to moderate decrease  G3b                   30-44                Moderate to severe decrease  G4                    15-29                Severe decrease  G5                    14 or less           Kidney failure          (1)In the absence of evidence of kidney disease, neither GFR category G1 or G2 fulfill the criteria for CKD.    eGFR calculation 2021 CKD-EPI creatinine equation, which does not include race as a factor   LIPASE - Abnormal; Notable for the following components:    Lipase 285 (*)     All other components within normal limits   URINALYSIS W/ MICROSCOPIC IF INDICATED (NO CULTURE) - Abnormal; Notable for the following components:    Ketones, UA 40 mg/dL (2+) (*)     All other components within normal limits    Narrative:     Urine microscopic not indicated.   TROPONIN - Abnormal; Notable for the following components:    HS Troponin T 20 (*)     All other components within normal limits    Narrative:     High Sensitive Troponin T Reference Range:  <14.0 ng/L- Negative Female for AMI  <22.0 ng/L- Negative Male for AMI  >=14 - Abnormal Female indicating possible myocardial injury.  >=22 - Abnormal Male indicating possible myocardial injury.   Clinicians would have to utilize clinical acumen, EKG, Troponin, and serial changes to determine if it is an Acute  Myocardial Infarction or myocardial injury due to an underlying chronic condition.        HIGH SENSITIVITIY TROPONIN T 1HR - Abnormal; Notable for the following components:    HS Troponin T 20 (*)     All other components within normal limits    Narrative:     High Sensitive Troponin T Reference Range:  <14.0 ng/L- Negative Female for AMI  <22.0 ng/L- Negative Male for AMI  >=14 - Abnormal Female indicating possible myocardial injury.  >=22 - Abnormal Male indicating possible myocardial injury.   Clinicians would have to utilize clinical acumen, EKG, Troponin, and serial changes to determine if it is an Acute Myocardial Infarction or myocardial injury due to an underlying chronic condition.        LIPID PANEL - Abnormal; Notable for the following components:    HDL Cholesterol 61 (*)     All other components within normal limits    Narrative:     Cholesterol Reference Ranges  (U.S. Department of Health and Human Services ATP III Classifications)    Desirable          <200 mg/dL  Borderline High    200-239 mg/dL  High Risk          >240 mg/dL      Triglyceride Reference Ranges  (U.S. Department of Health and Human Services ATP III Classifications)    Normal           <150 mg/dL  Borderline High  150-199 mg/dL  High             200-499 mg/dL  Very High        >500 mg/dL    HDL Reference Ranges  (U.S. Department of Health and Human Services ATP III Classifications)    Low     <40 mg/dl (major risk factor for CHD)  High    >60 mg/dl ('negative' risk factor for CHD)        LDL Reference Ranges  (U.S. Department of Health and Human Services ATP III Classifications)    Optimal          <100 mg/dL  Near Optimal     100-129 mg/dL  Borderline High  130-159 mg/dL  High             160-189 mg/dL  Very High        >189 mg/dL    LDL is calculated using the NIH LDL-C calculation.     HEMOGLOBIN A1C - Abnormal; Notable for the following components:    Hemoglobin A1C 6.00 (*)     All other components within normal limits     Narrative:     Hemoglobin A1C Ranges:    Increased Risk for Diabetes  5.7% to 6.4%  Diabetes                     >= 6.5%  Diabetic Goal                < 7.0%   BLOOD GAS, ARTERIAL W/CO-OXIMETRY - Abnormal; Notable for the following components:    pO2, Arterial 75.6 (*)     HCO3, Arterial 26.9 (*)     Base Excess, Arterial 2.3 (*)     Hemoglobin, Blood Gas 13.4 (*)     pO2, Temperature Corrected 75.6 (*)     All other components within normal limits   COVID-19/FLUA&B/RSV, NP SWAB IN TRANSPORT MEDIA 1 HR TAT - Normal    Narrative:     Fact sheet for providers: https://www.fda.gov/media/033608/download    Fact sheet for patients: https://www.fda.gov/media/239100/download    Test performed by PCR.   BNP (IN-HOUSE) - Normal    Narrative:     This assay is used as an aid in the diagnosis of individuals suspected of having heart failure. It can be used as an aid in the diagnosis of acute decompensated heart failure (ADHF) in patients presenting with signs and symptoms of ADHF to the emergency department (ED). In addition, NT-proBNP of <300 pg/mL indicates ADHF is not likely.    Age Range Result Interpretation  NT-proBNP Concentration (pg/mL:      <50             Positive            >450                   Gray                 300-450                    Negative             <300    50-75           Positive            >900                  Gray                300-900                  Negative            <300      >75             Positive            >1800                  Gray                300-1800                  Negative            <300   LACTIC ACID, PLASMA - Normal   PROCALCITONIN - Normal    Narrative:     As a Marker for Sepsis (Non-Neonates):    1. <0.5 ng/mL represents a low risk of severe sepsis and/or septic shock.  2. >2 ng/mL represents a high risk of severe sepsis and/or septic shock.    As a Marker for Lower Respiratory Tract Infections that require antibiotic therapy:    PCT on Admission    Antibiotic Therapy  "      6-12 Hrs later    >0.5                Strongly Recommended  >0.25 - <0.5        Recommended   0.1 - 0.25          Discouraged              Remeasure/reassess PCT  <0.1                Strongly Discouraged     Remeasure/reassess PCT    As 28 day mortality risk marker: \"Change in Procalcitonin Result\" (>80% or <=80%) if Day 0 (or Day 1) and Day 4 values are available. Refer to http://www.[facility] [place]-pct-calculator.com    Change in PCT <=80%  A decrease of PCT levels below or equal to 80% defines a positive change in PCT test result representing a higher risk for 28-day all-cause mortality of patients diagnosed with severe sepsis for septic shock.    Change in PCT >80%  A decrease of PCT levels of more than 80% defines a negative change in PCT result representing a lower risk for 28-day all-cause mortality of patients diagnosed with severe sepsis or septic shock.      C-REACTIVE PROTEIN - Normal   ACETAMINOPHEN LEVEL - Normal   ETHANOL - Normal    Narrative:     Elevated lactic acid concentration and lactate dehydrogenase(LD) activity may falsely elevate enzymatically determined ethanol levels. Not for legal purposes.    URINE DRUG SCREEN - Normal    Narrative:     Cutoff For Drugs Screened:    Amphetamines               500 ng/ml  Barbiturates               200 ng/ml  Benzodiazepines            150 ng/ml  Cocaine                    150 ng/ml  Methadone                  200 ng/ml  Opiates                    100 ng/ml  Phencyclidine               25 ng/ml  THC                         50 ng/ml  Methamphetamine            500 ng/ml  Tricyclic Antidepressants  300 ng/ml  Oxycodone                  100 ng/ml  Buprenorphine               10 ng/ml    The normal value for all drugs tested is negative. This report includes unconfirmed screening results, with the cutoff values listed, to be used for medical treatment purposes only.  Unconfirmed results must not be used for non-medical purposes such as employment or legal " testing.  Clinical consideration should be applied to any drug of abuse test, particularly when unconfirmed results are used.     SALICYLATE LEVEL - Normal   MAGNESIUM - Normal   PHOSPHORUS - Normal   TSH RFX ON ABNORMAL TO FREE T4 - Normal   CBC WITH AUTO DIFFERENTIAL - Normal   FENTANYL, URINE - Normal    Narrative:     Negative Threshold:      Fentanyl 5 ng/mL     The normal value for the drug tested is negative. This report includes final unconfirmed screening results to be used for medical treatment purposes only. Unconfirmed results must not be used for non-medical purposes such as employment or legal testing. Clinical consideration should be applied to any drug of abuse test, particularly when unconfirmed results are used.          CK - Normal   PROTIME-INR - Normal   LACTATE DEHYDROGENASE - Normal   COVID PRE-OP / PRE-PROCEDURE SCREENING ORDER (NO ISOLATION)    Narrative:     The following orders were created for panel order COVID PRE-OP / PRE-PROCEDURE SCREENING ORDER (NO ISOLATION) - Swab, Nasopharynx.  Procedure                               Abnormality         Status                     ---------                               -----------         ------                     COVID-19, FLU A/B, RSV P...[[id number]]  Normal              Final result                 Please view results for these tests on the individual orders.   BLOOD CULTURE   BLOOD CULTURE   BLOOD GAS, ARTERIAL   CBC AND DIFFERENTIAL    Narrative:     The following orders were created for panel order CBC & Differential.  Procedure                               Abnormality         Status                     ---------                               -----------         ------                     CBC Auto Differential[[id number]]        Normal              Final result                 Please view results for these tests on the individual orders.       Meds Given in ED:   Medications   aspirin chewable tablet 81 mg (has no administration in time  range)   donepezil (ARICEPT) tablet 10 mg (has no administration in time range)   fluticasone (FLONASE) 50 MCG/ACT nasal spray 2 spray (has no administration in time range)   losartan (COZAAR) tablet 25 mg (has no administration in time range)   meclizine (ANTIVERT) tablet 12.5 mg (has no administration in time range)   pregabalin (LYRICA) capsule 50 mg (has no administration in time range)   QUEtiapine (SEROquel) tablet 25 mg (has no administration in time range)   rosuvastatin (CRESTOR) tablet 10 mg (has no administration in time range)   sodium chloride 0.9 % flush 10 mL (has no administration in time range)   sodium chloride 0.9 % flush 10 mL (has no administration in time range)   sodium chloride 0.9 % infusion 40 mL (has no administration in time range)   Enoxaparin Sodium (LOVENOX) syringe 40 mg (has no administration in time range)   nitroglycerin (NITROSTAT) SL tablet 0.4 mg (has no administration in time range)   lactated ringers infusion (has no administration in time range)   Potassium Replacement - Follow Nurse / BPA Driven Protocol (has no administration in time range)   Magnesium Standard Dose Replacement - Follow Nurse / BPA Driven Protocol (has no administration in time range)   Phosphorus Replacement - Follow Nurse / BPA Driven Protocol (has no administration in time range)   Calcium Replacement - Follow Nurse / BPA Driven Protocol (has no administration in time range)   acetaminophen (TYLENOL) tablet 650 mg (has no administration in time range)     Or   acetaminophen (TYLENOL) 160 MG/5ML oral solution 650 mg (has no administration in time range)     Or   acetaminophen (TYLENOL) suppository 650 mg (has no administration in time range)   melatonin tablet 5 mg (has no administration in time range)   sennosides-docusate (PERICOLACE) 8.6-50 MG per tablet 2 tablet (has no administration in time range)     And   polyethylene glycol (MIRALAX) packet 17 g (has no administration in time range)     And    bisacodyl (DULCOLAX) EC tablet 5 mg (has no administration in time range)     And   bisacodyl (DULCOLAX) suppository 10 mg (has no administration in time range)   ondansetron ODT (ZOFRAN-ODT) disintegrating tablet 4 mg (has no administration in time range)   simethicone (MYLICON) chewable tablet 80 mg (has no administration in time range)   lactated ringers bolus 1,000 mL (0 mL Intravenous Stopped 2/12/25 0822)   ondansetron (ZOFRAN) injection 4 mg (4 mg Intravenous Given 2/12/25 0723)   iopamidol (ISOVUE-300) 61 % injection 85 mL (85 mL Intravenous Given 2/12/25 0813)     lactated ringers, 100 mL/hr         Last NIH score:                                                          Dysphagia screening results:  Patient Factors Component (Dysphagia:Stroke or Rule-out)  Best Eye Response: 4-->(E4) spontaneous (02/12/25 0646)  Best Motor Response: 6-->(M6) obeys commands (02/12/25 0646)  Best Verbal Response: 5-->(V5) oriented (02/12/25 0646)  Rosamaria Coma Scale Score: 15 (02/12/25 0646)     Rosamaria Coma Scale:  No data recorded     CIWA:        Restraint Type:            Isolation Status:  No active isolations

## 2025-02-12 NOTE — ED PROVIDER NOTES
EMERGENCY DEPARTMENT ENCOUNTER    Pt Name: Monique Betts  MRN: [id number]  Pt :   1935  Room Number:  S316/1  Date of encounter:  2025  PCP: Sena Lomeli MD  ED Provider: Cody Gold MD    Historian: EMS, granddaughter, patient      HPI:  Chief Complaint: Multiple complaints        Context: Monique Betts is a 89-year-old woman who presented initially by EMS and is now accompanied by her granddaughter who presents for multiple complaints.  She had recent hospitalization for failure to thrive, hallucinations, cognitive decline.  She is having similar symptoms.  She said yesterday evening she felt wobbly and was having difficulty walking in the living room.  She had to hold onto the wall to get herself to the bedroom and then when she returned to the living room she thought there would be a mess where she had knocked things over or fallen but said there was no mess.  She is having dysuria and has history of urinary tract infection she also has had nausea and vomiting is having epigastric pain.  She is having generalized weakness but denies focal weakness.  She lives alone.  No other complaints at this time.      PAST MEDICAL HISTORY  Past Medical History:   Diagnosis Date    Cancer     Heart murmur     Skin Cancer    Sleep apnea     Not using machine for several years.         PAST SURGICAL HISTORY  Past Surgical History:   Procedure Laterality Date    CARDIAC CATHETERIZATION N/A 2022    Procedure: LEFT HEART CATH;  Surgeon: Antonino Koo MD;  Location: [facility] CATH INVASIVE LOCATION;  Service: Cardiovascular;  Laterality: N/A;         FAMILY HISTORY  Family History   Problem Relation Age of Onset    Breast cancer Maternal Aunt 60    Ovarian cancer Daughter 46         SOCIAL HISTORY  Social History     Socioeconomic History    Marital status:    Tobacco Use    Smoking status: Never    Smokeless tobacco: Never   Vaping Use    Vaping status: Never Used    Substance and Sexual Activity    Alcohol use: Yes     Alcohol/week: 7.0 standard drinks of alcohol     Types: 7 Shots of liquor per week    Drug use: Never    Sexual activity: Not Currently     Partners: Male         ALLERGIES  Pb-hyoscy-atropine-scopolamine and Lisinopril        REVIEW OF SYSTEMS  Review of Systems       All systems reviewed and negative except for those discussed in HPI.       PHYSICAL EXAM    I have reviewed the triage vital signs and nursing notes.    ED Triage Vitals   Temp Heart Rate Resp BP SpO2   02/12/25 0639 02/12/25 0639 02/12/25 0639 02/12/25 0642 02/12/25 0639   98.4 °F (36.9 °C) 52 18 (!) 181/71 99 %      Temp src Heart Rate Source Patient Position BP Location FiO2 (%)   02/12/25 0639 02/12/25 0639 02/12/25 0642 02/12/25 0642 --   Oral Monitor Lying Right arm        Physical Exam  GENERAL:   Appears in no acute distress.   HENT: Nares patent.  EYES: No scleral icterus.  CV: Regular rhythm, regular rate.  RESPIRATORY: Normal effort.  No audible wheezes, rales or rhonchi.  ABDOMEN: Soft, mildly distended with guarding in the epigastrium  MUSCULOSKELETAL: No deformities.   NEURO: Alert, moves all extremities, follows commands.  SKIN: Warm, dry, no rash visualized.      LAB RESULTS  Recent Results (from the past 24 hours)   COVID-19, FLU A/B, RSV PCR 1 HR TAT - Swab, Nasopharynx    Collection Time: 02/12/25  7:06 AM    Specimen: Nasopharynx; Swab   Result Value Ref Range    COVID19 Not Detected Not Detected - Ref. Range    Influenza A PCR Not Detected Not Detected    Influenza B PCR Not Detected Not Detected    RSV, PCR Not Detected Not Detected   ECG 12 Lead Altered Mental Status    Collection Time: 02/12/25  7:14 AM   Result Value Ref Range    QT Interval 492 ms    QTC Interval 470 ms   Comprehensive Metabolic Panel    Collection Time: 02/12/25  7:21 AM    Specimen: Blood   Result Value Ref Range    Glucose 130 (H) 65 - 99 mg/dL    BUN 15 8 - 23 mg/dL    Creatinine 1.01 (H) 0.57 - 1.00  mg/dL    Sodium 141 136 - 145 mmol/L    Potassium 3.9 3.5 - 5.2 mmol/L    Chloride 104 98 - 107 mmol/L    CO2 25.0 22.0 - 29.0 mmol/L    Calcium 9.4 8.6 - 10.5 mg/dL    Total Protein 6.8 6.0 - 8.5 g/dL    Albumin 4.3 3.5 - 5.2 g/dL    ALT (SGPT) 16 1 - 33 U/L    AST (SGOT) 19 1 - 32 U/L    Alkaline Phosphatase 62 39 - 117 U/L    Total Bilirubin 0.4 0.0 - 1.2 mg/dL    Globulin 2.5 gm/dL    A/G Ratio 1.7 g/dL    BUN/Creatinine Ratio 14.9 7.0 - 25.0    Anion Gap 12.0 5.0 - 15.0 mmol/L    eGFR 53.7 (L) >60.0 mL/min/1.73   Lipase    Collection Time: 02/12/25  7:21 AM    Specimen: Blood   Result Value Ref Range    Lipase 285 (H) 13 - 60 U/L   BNP    Collection Time: 02/12/25  7:21 AM    Specimen: Blood   Result Value Ref Range    proBNP 359.2 0.0 - 1,800.0 pg/mL   High Sensitivity Troponin T    Collection Time: 02/12/25  7:21 AM    Specimen: Blood   Result Value Ref Range    HS Troponin T 20 (H) <14 ng/L   Lactic Acid, Plasma    Collection Time: 02/12/25  7:21 AM    Specimen: Blood   Result Value Ref Range    Lactate 0.9 0.5 - 2.0 mmol/L   Procalcitonin    Collection Time: 02/12/25  7:21 AM    Specimen: Blood   Result Value Ref Range    Procalcitonin 0.04 0.00 - 0.25 ng/mL   C-reactive Protein    Collection Time: 02/12/25  7:21 AM    Specimen: Blood   Result Value Ref Range    C-Reactive Protein <0.30 0.00 - 0.50 mg/dL   Acetaminophen Level    Collection Time: 02/12/25  7:21 AM    Specimen: Blood   Result Value Ref Range    Acetaminophen <5.0 0.0 - 30.0 mcg/mL   Ethanol    Collection Time: 02/12/25  7:21 AM    Specimen: Blood   Result Value Ref Range    Ethanol <10 0 - 10 mg/dL   Salicylate Level    Collection Time: 02/12/25  7:21 AM    Specimen: Blood   Result Value Ref Range    Salicylate <0.3 <=30.0 mg/dL   Magnesium    Collection Time: 02/12/25  7:21 AM    Specimen: Blood   Result Value Ref Range    Magnesium 2.2 1.6 - 2.4 mg/dL   Phosphorus    Collection Time: 02/12/25  7:21 AM    Specimen: Blood   Result Value Ref  Range    Phosphorus 3.3 2.5 - 4.5 mg/dL   TSH Rfx On Abnormal To Free T4    Collection Time: 02/12/25  7:21 AM    Specimen: Blood   Result Value Ref Range    TSH 2.450 0.270 - 4.200 uIU/mL   CBC Auto Differential    Collection Time: 02/12/25  7:21 AM    Specimen: Blood   Result Value Ref Range    WBC 4.85 3.40 - 10.80 10*3/mm3    RBC 4.31 3.77 - 5.28 10*6/mm3    Hemoglobin 13.5 12.0 - 15.9 g/dL    Hematocrit 40.0 34.0 - 46.6 %    MCV 92.8 79.0 - 97.0 fL    MCH 31.3 26.6 - 33.0 pg    MCHC 33.8 31.5 - 35.7 g/dL    RDW 12.7 12.3 - 15.4 %    RDW-SD 43.8 37.0 - 54.0 fl    MPV 11.5 6.0 - 12.0 fL    Platelets 147 140 - 450 10*3/mm3    Neutrophil % 52.2 42.7 - 76.0 %    Lymphocyte % 36.5 19.6 - 45.3 %    Monocyte % 9.7 5.0 - 12.0 %    Eosinophil % 0.8 0.3 - 6.2 %    Basophil % 0.6 0.0 - 1.5 %    Immature Grans % 0.2 0.0 - 0.5 %    Neutrophils, Absolute 2.53 1.70 - 7.00 10*3/mm3    Lymphocytes, Absolute 1.77 0.70 - 3.10 10*3/mm3    Monocytes, Absolute 0.47 0.10 - 0.90 10*3/mm3    Eosinophils, Absolute 0.04 0.00 - 0.40 10*3/mm3    Basophils, Absolute 0.03 0.00 - 0.20 10*3/mm3    Immature Grans, Absolute 0.01 0.00 - 0.05 10*3/mm3    nRBC 0.0 0.0 - 0.2 /100 WBC   Lipid Panel    Collection Time: 02/12/25  7:21 AM    Specimen: Blood   Result Value Ref Range    Total Cholesterol 147 0 - 200 mg/dL    Triglycerides 117 0 - 150 mg/dL    HDL Cholesterol 61 (H) 40 - 60 mg/dL    LDL Cholesterol  65 0 - 100 mg/dL    VLDL Cholesterol 21 5 - 40 mg/dL    LDL/HDL Ratio 1.03    Hemoglobin A1c    Collection Time: 02/12/25  7:21 AM    Specimen: Blood   Result Value Ref Range    Hemoglobin A1C 6.00 (H) 4.80 - 5.60 %   Urinalysis With Microscopic If Indicated (No Culture) - Urine, Catheter    Collection Time: 02/12/25  7:34 AM    Specimen: Urine, Catheter   Result Value Ref Range    Color, UA Yellow Yellow, Straw    Appearance, UA Clear Clear    pH, UA <=5.0 5.0 - 8.0    Specific Gravity, UA 1.017 1.001 - 1.030    Glucose, UA Negative Negative     Ketones, UA 40 mg/dL (2+) (A) Negative    Bilirubin, UA Negative Negative    Blood, UA Negative Negative    Protein, UA Negative Negative    Leuk Esterase, UA Negative Negative    Nitrite, UA Negative Negative    Urobilinogen, UA 0.2 E.U./dL 0.2 - 1.0 E.U./dL   Urine Drug Screen - Urine, Clean Catch    Collection Time: 02/12/25  7:34 AM    Specimen: Urine, Clean Catch   Result Value Ref Range    THC, Screen, Urine Negative Negative    Phencyclidine (PCP), Urine Negative Negative    Cocaine Screen, Urine Negative Negative    Methamphetamine, Ur Negative Negative    Opiate Screen Negative Negative    Amphetamine Screen, Urine Negative Negative    Benzodiazepine Screen, Urine Negative Negative    Tricyclic Antidepressants Screen Negative Negative    Methadone Screen, Urine Negative Negative    Barbiturates Screen, Urine Negative Negative    Oxycodone Screen, Urine Negative Negative    Buprenorphine, Screen, Urine Negative Negative   Fentanyl, Urine - Urine, Clean Catch    Collection Time: 02/12/25  7:34 AM    Specimen: Urine, Clean Catch   Result Value Ref Range    Fentanyl, Urine Negative Negative   High Sensitivity Troponin T 1Hr    Collection Time: 02/12/25  8:45 AM    Specimen: Blood   Result Value Ref Range    HS Troponin T 20 (H) <14 ng/L    Troponin T Numeric Delta 0 ng/L    Troponin T % Delta 0 Abnormal if >/= 20%   CK    Collection Time: 02/12/25  8:45 AM    Specimen: Blood   Result Value Ref Range    Creatine Kinase 60 20 - 180 U/L   Lactate Dehydrogenase    Collection Time: 02/12/25  8:45 AM    Specimen: Blood   Result Value Ref Range     135 - 214 U/L   Blood Gas, Arterial With Co-Ox    Collection Time: 02/12/25 10:24 AM    Specimen: Arterial Blood   Result Value Ref Range    Site Right Radial     Otto's Test N/A     pH, Arterial 7.427 7.350 - 7.450 pH units    pCO2, Arterial 40.9 35.0 - 45.0 mm Hg    pO2, Arterial 75.6 (L) 83.0 - 108.0 mm Hg    HCO3, Arterial 26.9 (H) 20.0 - 26.0 mmol/L    Base  Excess, Arterial 2.3 (H) 0.0 - 2.0 mmol/L    Hemoglobin, Blood Gas 13.4 (L) 14 - 18 g/dL    Hematocrit, Blood Gas 41.0 38.0 - 51.0 %    Oxyhemoglobin 94.9 94 - 99 %    Methemoglobin 0.30 0.00 - 1.50 %    Carboxyhemoglobin 1.3 0 - 2 %    CO2 Content 28.2 22 - 33 mmol/L    Temperature 37.0     Barometric Pressure for Blood Gas      Modality Room Air     FIO2 21 %    Rate 0 Breaths/minute    PIP 0 cmH2O    IPAP 0     EPAP 0     pH, Temp Corrected 7.427 pH Units    pCO2, Temperature Corrected 40.9 35 - 45 mm Hg    pO2, Temperature Corrected 75.6 (L) 83 - 108 mm Hg   Protime-INR    Collection Time: 02/12/25 10:52 AM    Specimen: Blood   Result Value Ref Range    Protime 12.6 12.2 - 14.5 Seconds    INR 0.93 0.89 - 1.12       If labs were ordered, I independently reviewed the results and considered them in treating the patient.        RADIOLOGY  CT Abdomen Pelvis With Contrast    Result Date: 2/12/2025  CT ABDOMEN PELVIS W CONTRAST Date of Exam: 2/12/2025 7:59 AM EST Indication: Abdominal distention with epigastric tenderness, nausea and vomiting, altered mental status. Comparison: CT abdomen and pelvis 4/23/2023 Technique: Axial CT images were obtained of the abdomen and pelvis following the uneventful intravenous administration of 85 mL Isovue-300. Reconstructed coronal and sagittal images were also obtained. Automated exposure control and iterative construction methods were used. Findings: Lung bases appear grossly clear. Redemonstration of a couple scattered liver cysts, with otherwise unremarkable appearance of the liver. Unremarkable appearance of the gallbladder and bile ducts. Normal size and appearance of the spleen. Unremarkable appearance of the pancreas. Normal appearance of the adrenal glands. Unremarkable appearance of the kidneys, ureters, bladder, uterus, and adnexa. There is severe sigmoid diverticulosis without evidence of diverticulitis. Normal appendix. No bowel obstruction. There is a small sliding  hiatal hernia. No inflammatory change of the GI tract. No abdominopelvic free fluid or fat stranding. No pneumoperitoneum. There is atherosclerosis of the abdominal aorta unremarkable appearance of the vasculature. No bulky or suspicious abdominopelvic lymph nodes. The body wall soft tissues are unremarkable. There is a left-sided sacral nerve stimulator with right-sided pulse generator. No acute or suspicious bony findings.     Impression: No acute abdominopelvic findings. Sigmoid diverticulosis without diverticulitis. Unremarkable CT appearance of the pancreas. Electronically Signed: Luis Miguel Aguilar MD  2/12/2025 8:30 AM EST  Workstation ID: [id number]    CT Head Without Contrast    Result Date: 2/12/2025  CT HEAD WO CONTRAST Date of Exam: 2/12/2025 7:59 AM EST Indication: altered mental status, suspect metabolic. Comparison: October 25, 2024 Technique: Axial CT images were obtained of the head without contrast administration.  Automated exposure control and iterative construction methods were used. Findings: No acute intracranial hemorrhage or extra-axial collection is identified. The ventricles appear normal in caliber, with no evidence of mass effect or midline shift. The basal cisterns appear patent. The gray-white differentiation appears preserved. The calvarium appear intact. The paranasal sinuses are clear. The mastoid air cells are well-aerated. Subtle foci of periventricular and subcortical white matter hypodensities are nonspecific, but likely the sequela of mild chronic small vessel ischemic disease.     Impression: 1.No acute intracranial process identified. 2.Findings suggestive of mild chronic small vessel ischemic disease. Electronically Signed: Alvarez Cunha MD  2/12/2025 8:24 AM EST  Workstation ID: [id number]    XR Chest 1 View    Result Date: 2/12/2025  XR CHEST 1 VW Date of Exam: 2/12/2025 7:42 AM EST Indication: dyspnea, altered mental status Comparison: June 13, 2022 FINDINGS: No definite  focal or diffuse pulmonary infiltrate is identified.  No pneumothorax or significant pleural effusion. The heart appears mildly enlarged. Calcific atherosclerosis of the aortic arch. Mediastinal contour appears grossly unchanged.     1.Mild cardiomegaly. 2.No radiographic findings of acute pulmonary abnormality. Electronically Signed: Cabrera Mcdonaldcayetano  2/12/2025 8:03 AM EST  Workstation ID: [id number]     I ordered and independently reviewed the above noted radiographic studies.      I viewed images of chest x-ray which showed mild cardiomegaly no other acute pathology that I can appreciate per my independent interpretation.  Head CT only showing chronic findings  CT scan of the abdomen pelvis not showing any acute pathology specifically no inflammation around the pancreas.    See radiologist's dictation for official interpretation.        PROCEDURES    Procedures    ECG 12 Lead Altered Mental Status   Final Result   Test Reason : Altered Mental Status   Blood Pressure :   */*   mmHG   Vent. Rate :  55 BPM     Atrial Rate :  55 BPM      P-R Int : 140 ms          QRS Dur :  94 ms       QT Int : 492 ms       P-R-T Axes :  46 -19  22 degrees     QTcB Int : 470 ms      Sinus bradycardia      Abnormal ECG   When compared with ECG of 25-Oct-2024 17:27,      Confirmed by SUSI RAINES ([id number]) on 2/12/2025 7:40:31 AM      Referred By: EDMD           Confirmed By: SUSI RAINES          MEDICATIONS GIVEN IN ER    Medications   aspirin chewable tablet 81 mg (has no administration in time range)   donepezil (ARICEPT) tablet 10 mg (has no administration in time range)   fluticasone (FLONASE) 50 MCG/ACT nasal spray 2 spray (has no administration in time range)   losartan (COZAAR) tablet 25 mg (has no administration in time range)   meclizine (ANTIVERT) tablet 12.5 mg (has no administration in time range)   pregabalin (LYRICA) capsule 50 mg (has no administration in time range)   QUEtiapine (SEROquel) tablet 25 mg (has no  administration in time range)   rosuvastatin (CRESTOR) tablet 10 mg (has no administration in time range)   sodium chloride 0.9 % flush 10 mL (has no administration in time range)   sodium chloride 0.9 % flush 10 mL (has no administration in time range)   sodium chloride 0.9 % infusion 40 mL (has no administration in time range)   Enoxaparin Sodium (LOVENOX) syringe 40 mg (has no administration in time range)   nitroglycerin (NITROSTAT) SL tablet 0.4 mg (has no administration in time range)   lactated ringers infusion (100 mL/hr Intravenous New Bag 2/12/25 7263)   Potassium Replacement - Follow Nurse / BPA Driven Protocol (has no administration in time range)   Magnesium Standard Dose Replacement - Follow Nurse / BPA Driven Protocol (has no administration in time range)   Phosphorus Replacement - Follow Nurse / BPA Driven Protocol (has no administration in time range)   Calcium Replacement - Follow Nurse / BPA Driven Protocol (has no administration in time range)   acetaminophen (TYLENOL) tablet 650 mg (has no administration in time range)     Or   acetaminophen (TYLENOL) 160 MG/5ML oral solution 650 mg (has no administration in time range)     Or   acetaminophen (TYLENOL) suppository 650 mg (has no administration in time range)   melatonin tablet 5 mg (has no administration in time range)   sennosides-docusate (PERICOLACE) 8.6-50 MG per tablet 2 tablet (has no administration in time range)     And   polyethylene glycol (MIRALAX) packet 17 g (has no administration in time range)     And   bisacodyl (DULCOLAX) EC tablet 5 mg (has no administration in time range)     And   bisacodyl (DULCOLAX) suppository 10 mg (has no administration in time range)   ondansetron ODT (ZOFRAN-ODT) disintegrating tablet 4 mg (has no administration in time range)   simethicone (MYLICON) chewable tablet 80 mg (has no administration in time range)   lactated ringers bolus 1,000 mL (0 mL Intravenous Stopped 2/12/25 8055)   ondansetron  (ZOFRAN) injection 4 mg (4 mg Intravenous Given 2/12/25 2362)   iopamidol (ISOVUE-300) 61 % injection 85 mL (85 mL Intravenous Given 2/12/25 1504)         MEDICAL DECISION MAKING, PROGRESS, and CONSULTS    All labs, if obtained, have been independently reviewed by me.  All radiology studies, if obtained, have been reviewed by me and the radiologist dictating the report.  All EKGs, if obtained, have been independently viewed and interpreted by me/my attending physician.      Discussion below represents my analysis of pertinent findings related to patient's condition, differential diagnosis, treatment plan and final disposition.                                          Differential diagnosis:    Sepsis, psychosis, delirium, stroke, dementia, pneumonia, urinary tract infection, intra-abdominal infection, anemia, electrolyte abnormality      Additional sources:    - Discussed/ obtained information from independent historians: EMS, granddaughter    - External (non-ED) record review:  Chart review of previous hospitalization for failure to thrive and cognitive decline shows history of:  CAD s/p PCI, cognitive decline, chronic back pain, chronic urinary incontinence s/p bladder stimulator [    - Chronic or social conditions impacting care: Dementia, failure to thrive, cognitive decline, CAD, chronic urinary incontinence        Orders placed during this visit:  Orders Placed This Encounter   Procedures    COVID PRE-OP / PRE-PROCEDURE SCREENING ORDER (NO ISOLATION) - Swab, Nasopharynx    Blood Culture - Blood,    Blood Culture - Blood,    COVID-19, FLU A/B, RSV PCR 1 HR TAT - Swab, Nasopharynx    XR Chest 1 View    CT Head Without Contrast    CT Abdomen Pelvis With Contrast    Comprehensive Metabolic Panel    Lipase    Urinalysis With Microscopic If Indicated (No Culture) - Urine, Catheter    BNP    High Sensitivity Troponin T    Lactic Acid, Plasma    Procalcitonin    C-reactive Protein    Acetaminophen Level    Ethanol     Urine Drug Screen - Urine, Clean Catch    Salicylate Level    Magnesium    Phosphorus    TSH Rfx On Abnormal To Free T4    CBC Auto Differential    Fentanyl, Urine - Urine, Clean Catch    High Sensitivity Troponin T 1Hr    Lipid Panel    Blood Gas, Arterial -With Co-Ox Panel: Yes    CK    Hemoglobin A1c    Protime-INR    Basic Metabolic Panel    Magnesium    CBC (No Diff)    Lactate Dehydrogenase    Blood Gas, Arterial With Co-Ox    Diet: Regular/House; Fluid Consistency: Thin (IDDSI 0)    Vital Signs    Intake & Output    Weigh Patient    Oral Care    Place Sequential Compression Device    Maintain Sequential Compression Device    Maintain IV Access    Telemetry - Place Orders & Notify Provider of Results When Patient Experiences Acute Chest Pain, Dysrhythmia or Respiratory Distress    May Be Off Telemetry for Tests    Activity - Ad Magali    Code Status and Medical Interventions: No CPR (Do Not Attempt to Resuscitate); Limited Support; No intubation (DNI)    Case Management  Consult    Inpatient Nutrition Consult    OT Consult: Eval & Treat ADL Performance Below Baseline, Discharge Placement Assessment    PT Consult: Eval & Treat Discharge Placement Assessment, Functional Mobility Below Baseline    Incentive Spirometry    ECG 12 Lead Altered Mental Status    Insert Peripheral IV    Initiate Observation Status    Inpatient Admission    ED Bed Request    CBC & Differential         Additional orders considered but not ordered:      ED Course:    Consultants: Hospital medicine    ED Course as of 02/12/25 1630   Wed Feb 12, 2025   0646 Chart review of previous hospitalization for failure to thrive and cognitive decline shows history of:  CAD s/p PCI, cognitive decline, chronic back pain, chronic urinary incontinence s/p bladder stimulator [CC]   0657 This is an 89-year-old woman who presented initially by EMS and is now accompanied by her granddaughter who presents for multiple complaints.  She had recent  hospitalization for failure to thrive, hallucinations, cognitive decline.  She is having similar symptoms.  She said yesterday evening she felt wobbly and was having difficulty walking in the living room.  She had to hold onto the wall to get herself to the bedroom and then when she returned to the living room she thought there would be a mess where she had knocked things over or fallen but said there was no mess.  She is having dysuria and has history of urinary tract infection she also has had nausea and vomiting is having epigastric pain.  She is having generalized weakness but denies focal weakness.  She lives alone.  No other complaints at this time. [CC]   0658 She arrived awake and alert she is intermittently confused in conversation consistent with her known dementia generalized weakness I do not think her living alone is an ideal situation and her granddaughter accompanying her seems to agree. [CC]   0659   Blood pressure 181/71 otherwise vitals within normal limits and generally reassuring she does have some abdominal distention and tenderness in the epigastrium.  No focal neurologic deficits that I can appreciate.  Treating with IV fluids and Zofran obtaining full altered mental status and infectious workup as well as CT scan of both the head and abdomen.  Will reevaluate pending initial workup. [CC]   1629 CBC and CMP reassuring and nonactionable  Mildly elevated troponin  Lipase of 285 in conjunction with her epigastric pain consistent with a diagnosis of pancreatitis.  Lipids were added on which only shows mildly elevated HDL but does not explain the pancreatitis the rest of her labs generally reassuring and nonactionable.  CT scan of the abdomen pelvis not showing any acute pathology.  At this point with her ongoing discomfort, pancreatitis and confusion plus social issues of not being particularly safe to live alone as she is currently doing.  I think she would benefit from hospital admission.   Medicine team consulted for admission. [CC]      ED Course User Index  [CC] Cody Gold MD              Shared Decision Making:  After my consideration of clinical presentation and any laboratory/radiology studies obtained, I discussed the findings with the patient/patient representative who is in agreement with the treatment plan and the final disposition.   Risks and benefits of discharge and/or observation/admission were discussed.       AS OF 16:30 EST VITALS:    BP - 129/62  HR - 53  TEMP - 98.4 °F (36.9 °C) (Oral)  O2 SATS - 97%                  DIAGNOSIS  Final diagnoses:   Acute pancreatitis without infection or necrosis, unspecified pancreatitis type   Encephalopathy acute   Dementia with agitation, unspecified dementia severity, unspecified dementia type   Essential hypertension   Cognitive decline   Failure to thrive in adult   Coronary artery disease involving native coronary artery of native heart without angina pectoris         DISPOSITION  Admit      Please note that portions of this document were completed with voice recognition software.        Cody Gold MD  02/12/25 [id number]

## 2025-02-12 NOTE — Clinical Note
Level of Care: Telemetry [5]   Diagnosis: Abdominal pain [688471]   Admitting Physician: JAK REARDON [[id number]]   Attending Physician: JAK REARDON [[id number]]

## 2025-02-12 NOTE — H&P
[facility]  HISTORY AND PHYSICAL    Patient Name: Monique Betts  : 1935  MRN: [id number]  Primary Care Physician: Sena Lomeli MD  Date of admission: 2025      Subjective   Subjective     Chief Complaint:  Generalized weakness    HPI:  Monique Betts is a 89 y.o. female with a PMH significant for CAD s/p PCI, chronic back pain, chronic urinary incontinence s/p bladder stimulator, dementia with cognitive decline as well as left hip and left shoulder osteoarthritis.  She presents to [facility] ED with her daughter due to significant generalized weakness in her legs and upper extremities, endorsing mild suprapubic and LLQ abdominal pain (does not recall onset of pain), with mild nausea without vomiting and subjective chills.  Also endorsing lack of appetite with significant decline in fluid intake, states has been many days since she had a meal.  She also endorses hallucinations, states she hallucinated that she fell but she did not fall.  Appears that this occurred during previous admission as well and 2024.  Of note patient lives alone.  She denies any recent ill contacts, falls, chest pain, diarrhea, constipation, hematochezia, melena, vomiting or hematemesis.    In the ED, patient was hemodynamically stable, troponins 20 - - 20, equivocal, no significant new ST changes on EKG.  Creatinine 1.01 (baseline), glucose 130, TSH 2.450, total cholesterol 147, triglycerides 117, lipase 285, procalcitonin 0.04, lactic acid 0.9, no leukocytosis or anemia with hemoglobin 13.5.  UA negative for pyuria or bacteriuria, ketones 40 mg/dL.  COVID/flu PCR negative.  Ethanol level WNL.  UDS negative.  CT abdomen pelvis revealed diverticulosis without diverticulitis, no evidence of pancreatitis, liver cysts evident.  No significant bladder distention.  CT head showing chronic changes with no acute intracranial abnormality.  Received IV fluids.   Hospitalist team was contacted for admission, agreed to admit.      Personal History     Past Medical History:   Diagnosis Date   • Cancer    • Heart murmur 2000    Skin Cancer   • Sleep apnea 1999    Not using machine for several years.           Past Surgical History:   Procedure Laterality Date   • CARDIAC CATHETERIZATION N/A 06/08/2022    Procedure: LEFT HEART CATH;  Surgeon: Antonino Koo MD;  Location: [facility] CATH INVASIVE LOCATION;  Service: Cardiovascular;  Laterality: N/A;       Family History: family history includes Breast cancer (age of onset: 60) in her maternal aunt; Ovarian cancer (age of onset: 46) in her daughter.     Social History:  reports that she has never smoked. She has never used smokeless tobacco. She reports current alcohol use of about 7.0 standard drinks of alcohol per week. She reports that she does not use drugs.  Social History     Social History Narrative   • Not on file       Medications:  Available home medication information reviewed.  Diclofenac Sodium, Magnesium, QUEtiapine, alendronate, aspirin, donepezil, fluticasone, losartan, meclizine, melatonin, multivitamin with minerals, nitroglycerin, pregabalin, rosuvastatin, and vitamin b complex    Allergies   Allergen Reactions   • Pb-Hyoscy-Atropine-Scopolamine Anaphylaxis   • Lisinopril Hives       Objective   Objective     Vital Signs:   Temp:  [98.4 °F (36.9 °C)] 98.4 °F (36.9 °C)  Heart Rate:  [51-57] 51  Resp:  [18] 18  BP: (157-187)/(62-76) 166/67       Physical Exam   Constitutional: Awake, alert  Eyes: PERRLA, sclerae anicteric, no conjunctival injection  HENT: NCAT, mucous membranes moist  Neck: Supple, no thyromegaly, no lymphadenopathy, trachea midline  Respiratory: Clear to auscultation bilaterally, nonlabored respirations   Cardiovascular: RRR, no murmurs, rubs, or gallops, palpable pedal pulses bilaterally  Gastrointestinal: Positive bowel sounds, soft with mildly tender LLQ and suprapubic region without  guarding  Musculoskeletal: No bilateral ankle edema, no clubbing or cyanosis to extremities  Psychiatric: Appropriate affect, cooperative  Neurologic: Oriented x 3, strength symmetric in all extremities, Cranial Nerves grossly intact to confrontation, speech clear  Skin: No rashes      Result Review:  I have personally reviewed the results from the time of this admission to 2/12/2025 10:33 EST and agree with these findings:  [x]  Laboratory list / accordion  [x]  Microbiology  [x]  Radiology  [x]  EKG/Telemetry   [x]  Cardiology/Vascular   [x]  Pathology  [x]  Old records  []  Other:        LAB RESULTS:      Lab 02/12/25  0721   WBC 4.85   HEMOGLOBIN 13.5   HEMATOCRIT 40.0   PLATELETS 147   NEUTROS ABS 2.53   IMMATURE GRANS (ABS) 0.01   LYMPHS ABS 1.77   MONOS ABS 0.47   EOS ABS 0.04   MCV 92.8   CRP <0.30   PROCALCITONIN 0.04   LACTATE 0.9         Lab 02/12/25  0721   SODIUM 141   POTASSIUM 3.9   CHLORIDE 104   CO2 25.0   ANION GAP 12.0   BUN 15   CREATININE 1.01*   EGFR 53.7*   GLUCOSE 130*   CALCIUM 9.4   MAGNESIUM 2.2   PHOSPHORUS 3.3   TSH 2.450         Lab 02/12/25  0721   TOTAL PROTEIN 6.8   ALBUMIN 4.3   GLOBULIN 2.5   ALT (SGPT) 16   AST (SGOT) 19   BILIRUBIN 0.4   ALK PHOS 62   LIPASE 285*         Lab 02/12/25  0845 02/12/25  0721   PROBNP  --  359.2   HSTROP T 20* 20*         Lab 02/12/25  0721   CHOLESTEROL 147   LDL CHOL 65   HDL CHOL 61*   TRIGLYCERIDES 117             Lab 02/12/25  1024   PH, ARTERIAL 7.427   PCO2, ARTERIAL 40.9   PO2 ART 75.6*   FIO2 21   HCO3 ART 26.9*   BASE EXCESS ART 2.3*   CARBOXYHEMOGLOBIN 1.3     UA          10/25/2024    16:45 2/12/2025    07:34   Urinalysis   Squamous Epithelial Cells, UA 3-6     Specific Gravity, UA 1.015  1.017    Ketones, UA 15 mg/dL (1+)  40 mg/dL (2+)    Blood, UA Trace  Negative    Leukocytes, UA Moderate (2+)  Negative    Nitrite, UA Negative  Negative    RBC, UA 0-2     WBC, UA 6-10     Bacteria, UA 2+         Microbiology Results (last 10 days)        Procedure Component Value - Date/Time    COVID PRE-OP / PRE-PROCEDURE SCREENING ORDER (NO ISOLATION) - Swab, Nasopharynx [[id number]]  (Normal) Collected: 02/12/25 0706    Lab Status: Final result Specimen: Swab from Nasopharynx Updated: 02/12/25 0754    Narrative:      The following orders were created for panel order COVID PRE-OP / PRE-PROCEDURE SCREENING ORDER (NO ISOLATION) - Swab, Nasopharynx.  Procedure                               Abnormality         Status                     ---------                               -----------         ------                     COVID-19, FLU A/B, RSV P...[[id number]]  Normal              Final result                 Please view results for these tests on the individual orders.    COVID-19, FLU A/B, RSV PCR 1 HR TAT - Swab, Nasopharynx [[id number]]  (Normal) Collected: 02/12/25 0706    Lab Status: Final result Specimen: Swab from Nasopharynx Updated: 02/12/25 0754     COVID19 Not Detected     Influenza A PCR Not Detected     Influenza B PCR Not Detected     RSV, PCR Not Detected    Narrative:      Fact sheet for providers: https://www.fda.gov/media/306967/download    Fact sheet for patients: https://www.fda.gov/media/264940/download    Test performed by PCR.            CT Abdomen Pelvis With Contrast    Result Date: 2/12/2025  CT ABDOMEN PELVIS W CONTRAST Date of Exam: 2/12/2025 7:59 AM EST Indication: Abdominal distention with epigastric tenderness, nausea and vomiting, altered mental status. Comparison: CT abdomen and pelvis 4/23/2023 Technique: Axial CT images were obtained of the abdomen and pelvis following the uneventful intravenous administration of 85 mL Isovue-300. Reconstructed coronal and sagittal images were also obtained. Automated exposure control and iterative construction methods were used. Findings: Lung bases appear grossly clear. Redemonstration of a couple scattered liver cysts, with otherwise unremarkable appearance of the liver. Unremarkable  appearance of the gallbladder and bile ducts. Normal size and appearance of the spleen. Unremarkable appearance of the pancreas. Normal appearance of the adrenal glands. Unremarkable appearance of the kidneys, ureters, bladder, uterus, and adnexa. There is severe sigmoid diverticulosis without evidence of diverticulitis. Normal appendix. No bowel obstruction. There is a small sliding hiatal hernia. No inflammatory change of the GI tract. No abdominopelvic free fluid or fat stranding. No pneumoperitoneum. There is atherosclerosis of the abdominal aorta unremarkable appearance of the vasculature. No bulky or suspicious abdominopelvic lymph nodes. The body wall soft tissues are unremarkable. There is a left-sided sacral nerve stimulator with right-sided pulse generator. No acute or suspicious bony findings.     Impression: Impression: No acute abdominopelvic findings. Sigmoid diverticulosis without diverticulitis. Unremarkable CT appearance of the pancreas. Electronically Signed: Luis Miguel Aguilar MD  2/12/2025 8:30 AM EST  Workstation ID: [id number]    CT Head Without Contrast    Result Date: 2/12/2025  CT HEAD WO CONTRAST Date of Exam: 2/12/2025 7:59 AM EST Indication: altered mental status, suspect metabolic. Comparison: October 25, 2024 Technique: Axial CT images were obtained of the head without contrast administration.  Automated exposure control and iterative construction methods were used. Findings: No acute intracranial hemorrhage or extra-axial collection is identified. The ventricles appear normal in caliber, with no evidence of mass effect or midline shift. The basal cisterns appear patent. The gray-white differentiation appears preserved. The calvarium appear intact. The paranasal sinuses are clear. The mastoid air cells are well-aerated. Subtle foci of periventricular and subcortical white matter hypodensities are nonspecific, but likely the sequela of mild chronic small vessel ischemic disease.      Impression: Impression: 1.No acute intracranial process identified. 2.Findings suggestive of mild chronic small vessel ischemic disease. Electronically Signed: Alvarez Cunha MD  2/12/2025 8:24 AM EST  Workstation ID: [id number]    XR Chest 1 View    Result Date: 2/12/2025  XR CHEST 1 VW Date of Exam: 2/12/2025 7:42 AM EST Indication: dyspnea, altered mental status Comparison: June 13, 2022 FINDINGS: No definite focal or diffuse pulmonary infiltrate is identified.  No pneumothorax or significant pleural effusion. The heart appears mildly enlarged. Calcific atherosclerosis of the aortic arch. Mediastinal contour appears grossly unchanged.     Impression: 1.Mild cardiomegaly. 2.No radiographic findings of acute pulmonary abnormality. Electronically Signed: Cabrera Bingham  2/12/2025 8:03 AM EST  Workstation ID: [id number]     Results for orders placed during the hospital encounter of 10/25/24    Adult Transthoracic Echo Complete W/ Cont if Necessary Per Protocol    Interpretation Summary  •  Left ventricular ejection fraction appears to be 56 - 60%.  •  Estimated right ventricular systolic pressure from tricuspid regurgitation is normal (<35 mmHg).  •  No significant valvular disease      Assessment & Plan   Assessment & Plan       Coronary artery disease involving native coronary artery of native heart without angina pectoris    Dyslipidemia, goal LDL below 70    Essential hypertension    GERD without esophagitis    Abdominal pain    Elevated lipase    Dementia without behavioral disturbance    Social problem    Delirium    Hallucination    Protein calorie malnutrition    Monique Betts is a 89 y.o. female with a PMH significant for CAD s/p PCI, chronic back pain, chronic urinary incontinence s/p bladder stimulator, dementia with cognitive decline as well as left hip and left shoulder osteoarthritis.  She presents to [facility] ED with her daughter due to significant generalized weakness in her legs  and upper extremities, endorsing mild suprapubic and LLQ abdominal pain (does not recall onset of pain), with mild nausea without vomiting and subjective chills.  Also endorsing lack of appetite with significant decline in fluid intake, states has been many days since she had a meal.  She also endorses hallucinations, states she hallucinated that she fell but she did not fall.  Labs and imaging largely unremarkable for acute findings, will consult case management for placement as patient is not safe to live alone under these conditions.    Generalized weakness  Protein calorie malnutrition  Lack of oral intake  Abdominal pain  Elevated lipase levels  Patient presented due to generalized weakness with suprapubic and LLQ abdominal pain associated with a decline in p.o. intake over the past few days  CT abdomen pelvis negative for any acute findings, evidence of liver cysts, diverticulosis without diverticulitis and no evidence of pancreatitis  Labs are largely unremarkable, isolated hyperlipasemia of 285, no significant epigastric pain radiating to back or CT findings suggestive of acute pancreatitis  Ketones 40 mg/dL on UA, no evidence of leukocyte esterase, pyuria or bacteriuria  Will check GGT and amylase levels, check CK levels as well as ABG  Treat nausea with Zofran  Consult to nutrition services  Simethicone for flatulence scheduled twice daily  Encourage oral intake  Give IV fluids 100 mL/h for 10 hours    Lives alone  Cognitive impairment with dementia  Hypoactive delirium previous admission  Social problem  Hallucination  Chronic urinary incontinence s/p bladder stim   Patient's daughter by the bedside endorses she has dementia however she is oriented to person, time and place, states it is waxing and waning  Patient having hallucinations that she had a fall however did not fall, appears she was having hallucinations during previous admission in November 2024, concerns for hypoactive delirium at the time.   Initially concern of possible alcohol withdrawal however she was not scoring very high on it.  Was discharged on Seroquel to regulate her sleep cycle  Unsafe for patient to live alone at this time, will place consult to case management for disposition  Continue home donepezil  Given her baseline dementia, if she undergoes hyper/hypoactive delirium can consider Depakote sprinkles as it has been shown to be more effective in elderly folks with dementia.  However for now we will continue her Seroquel.    CAD s/p PCI  Continue home statin and aspirin    HTN  Continue home medications of losartan, hold off on rate controlling agents due to sinus bradycardia    GERD  Continue home medications    Acute debility  H/o chronic back pain, L5/S1 radiculopathy  Left hip and left shoulder arthritis with pain  Patient denies orthostasis, denies worsening back pain or saddle anesthesia   MRI L-spine 10/30/2024 with degenerative changes of lumbar spine  Left hip pain and left shoulder pain likely secondary to arthritis.  X-ray with evidence of severe arthritis without any acute fractures previously  PT/OT consulted    VTE Prophylaxis:  Pharmacologic & mechanical VTE prophylaxis orders are present.    Total time spent: 80 minutes  Time spent includes time reviewing chart, face-to-face time, counseling patient/family/caregiver, ordering medications/tests/procedures, communicating with other health care professionals, documenting clinical information in the electronic health record, and coordination of care.        CODE STATUS:    Code Status and Medical Interventions: No CPR (Do Not Attempt to Resuscitate); Limited Support; No intubation (DNI)   Ordered at: 02/12/25 1005     Medical Intervention Limits:    No intubation (DNI)     Level Of Support Discussed With:    Health Care Surrogate    Patient     Code Status (Patient has no pulse and is not breathing):    No CPR (Do Not Attempt to Resuscitate)     Medical Interventions (Patient  has pulse or is breathing):    Limited Support       Expected Discharge   Expected discharge date/ time has not been documented.     Marlyn Trinh MD  02/12/25

## 2025-02-13 LAB
ANION GAP SERPL CALCULATED.3IONS-SCNC: 10 MMOL/L (ref 5–15)
BUN SERPL-MCNC: 12 MG/DL (ref 8–23)
BUN/CREAT SERPL: 13.5 (ref 7–25)
CALCIUM SPEC-SCNC: 8.6 MG/DL (ref 8.6–10.5)
CHLORIDE SERPL-SCNC: 106 MMOL/L (ref 98–107)
CO2 SERPL-SCNC: 24 MMOL/L (ref 22–29)
CREAT SERPL-MCNC: 0.89 MG/DL (ref 0.57–1)
DEPRECATED RDW RBC AUTO: 43.7 FL (ref 37–54)
EGFRCR SERPLBLD CKD-EPI 2021: 62.4 ML/MIN/1.73
ERYTHROCYTE [DISTWIDTH] IN BLOOD BY AUTOMATED COUNT: 12.8 % (ref 12.3–15.4)
GLUCOSE SERPL-MCNC: 101 MG/DL (ref 65–99)
HCT VFR BLD AUTO: 36.2 % (ref 34–46.6)
HGB BLD-MCNC: 12 G/DL (ref 12–15.9)
MAGNESIUM SERPL-MCNC: 2.2 MG/DL (ref 1.6–2.4)
MCH RBC QN AUTO: 31.1 PG (ref 26.6–33)
MCHC RBC AUTO-ENTMCNC: 33.1 G/DL (ref 31.5–35.7)
MCV RBC AUTO: 93.8 FL (ref 79–97)
PLATELET # BLD AUTO: 122 10*3/MM3 (ref 140–450)
PMV BLD AUTO: 12.2 FL (ref 6–12)
POTASSIUM SERPL-SCNC: 4 MMOL/L (ref 3.5–5.2)
RBC # BLD AUTO: 3.86 10*6/MM3 (ref 3.77–5.28)
SODIUM SERPL-SCNC: 140 MMOL/L (ref 136–145)
WBC NRBC COR # BLD AUTO: 4.57 10*3/MM3 (ref 3.4–10.8)

## 2025-02-13 PROCEDURE — 83735 ASSAY OF MAGNESIUM: CPT

## 2025-02-13 PROCEDURE — 99233 SBSQ HOSP IP/OBS HIGH 50: CPT | Performed by: FAMILY MEDICINE

## 2025-02-13 PROCEDURE — 63710000001 ONDANSETRON ODT 4 MG TABLET DISPERSIBLE

## 2025-02-13 PROCEDURE — 97530 THERAPEUTIC ACTIVITIES: CPT

## 2025-02-13 PROCEDURE — 80048 BASIC METABOLIC PNL TOTAL CA: CPT

## 2025-02-13 PROCEDURE — 97166 OT EVAL MOD COMPLEX 45 MIN: CPT

## 2025-02-13 PROCEDURE — 97161 PT EVAL LOW COMPLEX 20 MIN: CPT

## 2025-02-13 PROCEDURE — 25010000002 ENOXAPARIN PER 10 MG

## 2025-02-13 PROCEDURE — 85027 COMPLETE CBC AUTOMATED: CPT

## 2025-02-13 RX ORDER — LOSARTAN POTASSIUM 25 MG/1
25 TABLET ORAL DAILY
COMMUNITY

## 2025-02-13 RX ORDER — PREGABALIN 50 MG/1
50 CAPSULE ORAL 3 TIMES DAILY
Status: ON HOLD | COMMUNITY
End: 2025-02-17

## 2025-02-13 RX ADMIN — DONEPEZIL HYDROCHLORIDE 10 MG: 10 TABLET, FILM COATED ORAL at 08:42

## 2025-02-13 RX ADMIN — ENOXAPARIN SODIUM 40 MG: 100 INJECTION SUBCUTANEOUS at 08:43

## 2025-02-13 RX ADMIN — ONDANSETRON 4 MG: 4 TABLET, ORALLY DISINTEGRATING ORAL at 08:42

## 2025-02-13 RX ADMIN — SIMETHICONE 80 MG: 80 TABLET, CHEWABLE ORAL at 08:42

## 2025-02-13 RX ADMIN — SIMETHICONE 80 MG: 80 TABLET, CHEWABLE ORAL at 20:27

## 2025-02-13 RX ADMIN — PREGABALIN 50 MG: 50 CAPSULE ORAL at 20:27

## 2025-02-13 RX ADMIN — MECLIZINE HYDROCHLORIDE 12.5 MG: 25 TABLET ORAL at 23:48

## 2025-02-13 RX ADMIN — QUETIAPINE FUMARATE 25 MG: 25 TABLET ORAL at 20:27

## 2025-02-13 RX ADMIN — Medication 10 ML: at 08:43

## 2025-02-13 RX ADMIN — ROSUVASTATIN 10 MG: 10 TABLET, FILM COATED ORAL at 08:42

## 2025-02-13 RX ADMIN — PREGABALIN 50 MG: 50 CAPSULE ORAL at 16:03

## 2025-02-13 RX ADMIN — ACETAMINOPHEN 650 MG: 325 TABLET ORAL at 11:05

## 2025-02-13 RX ADMIN — ASPIRIN 81 MG 81 MG: 81 TABLET ORAL at 08:42

## 2025-02-13 RX ADMIN — Medication 10 MG: at 20:26

## 2025-02-13 RX ADMIN — Medication 10 ML: at 20:27

## 2025-02-13 RX ADMIN — PREGABALIN 50 MG: 50 CAPSULE ORAL at 08:43

## 2025-02-13 RX ADMIN — LOSARTAN POTASSIUM 25 MG: 50 TABLET, FILM COATED ORAL at 08:42

## 2025-02-13 NOTE — PROGRESS NOTES
[facility] Medicine Services  PROGRESS NOTE    Patient Name: Monique Betts  : 1935  MRN: [id number]    Date of Admission: 2025  Primary Care Physician: Sena Lomeli MD    Subjective   Subjective     CC:  F/U hallucinations, nausea    HPI:  Patient seen and examined. Family member at bedside. Patient has list of her concerns. Having both auditory and visual hallucinations. She is hearing music, she saw a dog in her room. She is Rx Seroquel at bedtime, hasn't been taking it the past few days. She also was having some abdominal pain at home PTA with decreased appetite and some diarrhea. This has now resolved. She ate breakfast. Hasn't had diarrhea since admission. Having trouble urinating, has a bladder stimulator.    Objective   Objective     Vital Signs:   Temp:  [97.8 °F (36.6 °C)-98.9 °F (37.2 °C)] 98.9 °F (37.2 °C)  Heart Rate:  [44-57] 50  Resp:  [18] 18  BP: (128-149)/(54-93) 128/59  Flow (L/min) (Oxygen Therapy):  [2] 2     Physical Exam:  Constitutional: No acute distress, awake, alert, elderly female  HENT: NCAT, mucous membranes moist  Respiratory: Clear to auscultation bilaterally, respiratory effort normal   Cardiovascular: RRR, no murmurs, rubs, or gallops  Gastrointestinal: Positive bowel sounds, soft, nontender, nondistended  Musculoskeletal: No bilateral ankle edema  Psychiatric: Appropriate affect, cooperative  Neurologic: Oriented x 3 but intermittent confusion, no focal deficits, speech clear   Skin: No rashes     Results Reviewed:  LAB RESULTS:      Lab 25  0412 25  1052 25  0845 25  0721   WBC 4.57  --   --  4.85   HEMOGLOBIN 12.0  --   --  13.5   HEMATOCRIT 36.2  --   --  40.0   PLATELETS 122*  --   --  147   NEUTROS ABS  --   --   --  2.53   IMMATURE GRANS (ABS)  --   --   --  0.01   LYMPHS ABS  --   --   --  1.77   MONOS ABS  --   --   --  0.47   EOS ABS  --   --   --  0.04   MCV 93.8  --   --  92.8   CRP  --   --   --   <0.30   PROCALCITONIN  --   --   --  0.04   LACTATE  --   --   --  0.9   LDH  --   --  198  --    PROTIME  --  12.6  --   --    HSTROP T  --   --  20* 20*         Lab 02/13/25  0412 02/12/25  0721   SODIUM 140 141   POTASSIUM 4.0 3.9   CHLORIDE 106 104   CO2 24.0 25.0   ANION GAP 10.0 12.0   BUN 12 15   CREATININE 0.89 1.01*   EGFR 62.4 53.7*   GLUCOSE 101* 130*   CALCIUM 8.6 9.4   MAGNESIUM 2.2 2.2   PHOSPHORUS  --  3.3   HEMOGLOBIN A1C  --  6.00*   TSH  --  2.450         Lab 02/12/25  0721   TOTAL PROTEIN 6.8   ALBUMIN 4.3   GLOBULIN 2.5   ALT (SGPT) 16   AST (SGOT) 19   BILIRUBIN 0.4   ALK PHOS 62   LIPASE 285*         Lab 02/12/25  1052 02/12/25  0845 02/12/25  0721   PROBNP  --   --  359.2   HSTROP T  --  20* 20*   PROTIME 12.6  --   --    INR 0.93  --   --          Lab 02/12/25  0721   CHOLESTEROL 147   LDL CHOL 65   HDL CHOL 61*   TRIGLYCERIDES 117             Lab 02/12/25  1024   PH, ARTERIAL 7.427   PCO2, ARTERIAL 40.9   PO2 ART 75.6*   FIO2 21   HCO3 ART 26.9*   BASE EXCESS ART 2.3*   CARBOXYHEMOGLOBIN 1.3     Brief Urine Lab Results  (Last result in the past 365 days)        Color   Clarity   Blood   Leuk Est   Nitrite   Protein   CREAT   Urine HCG        02/12/25 0734 Yellow   Clear   Negative   Negative   Negative   Negative                   Microbiology Results Abnormal       None            CT Abdomen Pelvis With Contrast    Result Date: 2/12/2025  CT ABDOMEN PELVIS W CONTRAST Date of Exam: 2/12/2025 7:59 AM EST Indication: Abdominal distention with epigastric tenderness, nausea and vomiting, altered mental status. Comparison: CT abdomen and pelvis 4/23/2023 Technique: Axial CT images were obtained of the abdomen and pelvis following the uneventful intravenous administration of 85 mL Isovue-300. Reconstructed coronal and sagittal images were also obtained. Automated exposure control and iterative construction methods were used. Findings: Lung bases appear grossly clear. Redemonstration of a couple  scattered liver cysts, with otherwise unremarkable appearance of the liver. Unremarkable appearance of the gallbladder and bile ducts. Normal size and appearance of the spleen. Unremarkable appearance of the pancreas. Normal appearance of the adrenal glands. Unremarkable appearance of the kidneys, ureters, bladder, uterus, and adnexa. There is severe sigmoid diverticulosis without evidence of diverticulitis. Normal appendix. No bowel obstruction. There is a small sliding hiatal hernia. No inflammatory change of the GI tract. No abdominopelvic free fluid or fat stranding. No pneumoperitoneum. There is atherosclerosis of the abdominal aorta unremarkable appearance of the vasculature. No bulky or suspicious abdominopelvic lymph nodes. The body wall soft tissues are unremarkable. There is a left-sided sacral nerve stimulator with right-sided pulse generator. No acute or suspicious bony findings.     Impression: Impression: No acute abdominopelvic findings. Sigmoid diverticulosis without diverticulitis. Unremarkable CT appearance of the pancreas. Electronically Signed: Luis Migule Aguilar MD  2/12/2025 8:30 AM EST  Workstation ID: [id number]    CT Head Without Contrast    Result Date: 2/12/2025  CT HEAD WO CONTRAST Date of Exam: 2/12/2025 7:59 AM EST Indication: altered mental status, suspect metabolic. Comparison: October 25, 2024 Technique: Axial CT images were obtained of the head without contrast administration.  Automated exposure control and iterative construction methods were used. Findings: No acute intracranial hemorrhage or extra-axial collection is identified. The ventricles appear normal in caliber, with no evidence of mass effect or midline shift. The basal cisterns appear patent. The gray-white differentiation appears preserved. The calvarium appear intact. The paranasal sinuses are clear. The mastoid air cells are well-aerated. Subtle foci of periventricular and subcortical white matter hypodensities are  nonspecific, but likely the sequela of mild chronic small vessel ischemic disease.     Impression: Impression: 1.No acute intracranial process identified. 2.Findings suggestive of mild chronic small vessel ischemic disease. Electronically Signed: Alvarez Cunha MD  2/12/2025 8:24 AM EST  Workstation ID: [id number]    XR Chest 1 View    Result Date: 2/12/2025  XR CHEST 1 VW Date of Exam: 2/12/2025 7:42 AM EST Indication: dyspnea, altered mental status Comparison: June 13, 2022 FINDINGS: No definite focal or diffuse pulmonary infiltrate is identified.  No pneumothorax or significant pleural effusion. The heart appears mildly enlarged. Calcific atherosclerosis of the aortic arch. Mediastinal contour appears grossly unchanged.     Impression: 1.Mild cardiomegaly. 2.No radiographic findings of acute pulmonary abnormality. Electronically Signed: Cabrera Bingham  2/12/2025 8:03 AM EST  Workstation ID: [id number]     Results for orders placed during the hospital encounter of 10/25/24    Adult Transthoracic Echo Complete W/ Cont if Necessary Per Protocol    Interpretation Summary  •  Left ventricular ejection fraction appears to be 56 - 60%.  •  Estimated right ventricular systolic pressure from tricuspid regurgitation is normal (<35 mmHg).  •  No significant valvular disease      Current medications:  Scheduled Meds:aspirin, 81 mg, Oral, Daily  donepezil, 10 mg, Oral, Daily  enoxaparin, 40 mg, Subcutaneous, Daily  losartan, 25 mg, Oral, Q24H  melatonin, 10 mg, Oral, Nightly  pregabalin, 50 mg, Oral, TID  QUEtiapine, 25 mg, Oral, Nightly  rosuvastatin, 10 mg, Oral, Daily  simethicone, 80 mg, Oral, BID  sodium chloride, 10 mL, Intravenous, Q12H      Continuous Infusions:   PRN Meds:.•  acetaminophen **OR** acetaminophen **OR** acetaminophen  •  senna-docusate sodium **AND** polyethylene glycol **AND** bisacodyl **AND** bisacodyl  •  Calcium Replacement - Follow Nurse / BPA Driven Protocol  •  fluticasone  •  Magnesium Standard  Dose Replacement - Follow Nurse / BPA Driven Protocol  •  meclizine  •  nitroglycerin  •  ondansetron ODT  •  Phosphorus Replacement - Follow Nurse / BPA Driven Protocol  •  Potassium Replacement - Follow Nurse / BPA Driven Protocol  •  sodium chloride  •  sodium chloride    Assessment & Plan   Assessment & Plan     Active Hospital Problems    Diagnosis  POA   • Abdominal pain [R10.9]  Yes   • Elevated lipase [R74.8]  Unknown   • Dementia without behavioral disturbance [F03.90]  Unknown   • Social problem [Z60.9]  Not Applicable   • Delirium [R41.0]  Unknown   • Hallucination [R44.3]  Unknown   • Protein calorie malnutrition [E46]  Unknown   • GERD without esophagitis [K21.9]  Yes   • Essential hypertension [I10]  Yes   • Dyslipidemia, goal LDL below 70 [E78.5]  Yes   • Coronary artery disease involving native coronary artery of native heart without angina pectoris [I25.10]  Yes      Resolved Hospital Problems   No resolved problems to display.        Brief Hospital Course to date:  Monique Betts is a 89 y.o. female  with a PMH significant for CAD s/p PCI, chronic back pain, chronic urinary incontinence s/p bladder stimulator, dementia with cognitive decline as well as left hip and left shoulder osteoarthritis.  She presents to [facility] ED with her daughter due to significant generalized weakness in her legs and upper extremities, endorsing mild suprapubic and LLQ abdominal pain (does not recall onset of pain), with mild nausea without vomiting and subjective chills.  Also endorsing lack of appetite with significant decline in fluid intake, states has been many days since she had a meal.  She also endorses hallucinations.    This patient's problems and plans were partially entered by my partner and updated as appropriate by me 02/13/25.   All problems are new to me today    Nausea, diarrhea, abdominal pain -> resolved  -Possible viral illness PTA, now resolved, patient eating  -CT imaging negative       Dementia  Visual/Auditory Hallucinations   -Patient started on Seroquel last admit in Oct/Nov '24, this was continued after her DC from [facility]  -Patient states she hasn't been taking it the past few days which is likely the cause of her hallucinations vs progression of her dementia   -UA negative, CT head negative  -Delirium precautions. Discussed with family that this could worsen while in hospital      CAD s/p PCI  -Continue home statin and aspirin     HTN  -Continue Losartan     Urinary Incontinence  -S/P bladder stimulator per Dr Smith 8/2023   -Has paperwork and remote with her  -Has had issues with retention since admission      H/o chronic back pain, L5/S1 radiculopathy  Left hip and left shoulder arthritis with pain  -Follows with . Patient tells me she is supposed to have an Epidural injection soon, discussed with her to reschedule her appt  -PT/OT eval    Dispo: Patient currently lives alone. With her advancing dementia, family interested in at least [facility] with possible transition later to LTC. CM to follow-up today. Patient does have a POA.     Expected Discharge Location and Transportation: [facility]  Expected Discharge   Expected Discharge Date: 2/17/2025; Expected Discharge Time:      VTE Prophylaxis:  Pharmacologic & mechanical VTE prophylaxis orders are present.         AM-PAC 6 Clicks Score (PT): 19 (02/13/25 9509)    CODE STATUS:   Code Status and Medical Interventions: No CPR (Do Not Attempt to Resuscitate); Limited Support; No intubation (DNI)   Ordered at: 02/12/25 1005     Medical Intervention Limits:    No intubation (DNI)     Level Of Support Discussed With:    Health Care Surrogate    Patient     Code Status (Patient has no pulse and is not breathing):    No CPR (Do Not Attempt to Resuscitate)     Medical Interventions (Patient has pulse or is breathing):    Limited Support       Antoinette Alcaraz,   02/13/25

## 2025-02-13 NOTE — PLAN OF CARE
Goal Outcome Evaluation:  Plan of Care Reviewed With: grandchild(milena), patient           Outcome Evaluation: Pt presented below baseline with strength and endurance which impairs her functional mobility, she also has decreased safety awareness and decreased problem solving during mobility which are barriers to DC home alone. PT recommends SNF for optimal recovery.    Anticipated Discharge Disposition (PT): skilled nursing facility

## 2025-02-13 NOTE — CONSULTS
"          Clinical Nutrition Assessment   Patient Name: Monique Betts  YOB: 1935  MRN: [id number]  Date of Encounter: 02/13/25 13:12 EST  Admission date: 2/12/2025  Reason for Visit: MST score 2+, Malnutrition Severity Assessment, Consult, Unintentional weight loss, Reduced oral intake    Assessment   Nutrition Assessment   Admission Diagnosis:  Pancreatitis [K85.90]  Abdominal pain [R10.9]    Problem List:    Coronary artery disease involving native coronary artery of native heart without angina pectoris    Dyslipidemia, goal LDL below 70    Essential hypertension    GERD without esophagitis    Abdominal pain    Elevated lipase    Dementia without behavioral disturbance    Social problem    Delirium    Hallucination    Protein calorie malnutrition    PMH:   She  has a past medical history of Cancer, Heart murmur (2000), and Sleep apnea (1999).    PSH:  She  has a past surgical history that includes Cardiac catheterization (N/A, 06/08/2022).    Applicable Nutrition History:   CAD s/p PCI  Chronic back pain  Dementia    Anthropometrics   Height: Height: 162.6 cm (64\")  Last Filed Weight: Weight: 65.3 kg (144 lb) (02/12/25 0639)  Method:    BMI: BMI (Calculated): 24.7    UBW:   Weight      Weight (kg) Weight (lbs) Weight Method   7/29/2023 61.236 kg  135 lb  Stated    5/9/2024 65.499 kg  144 lb 6.4 oz     10/25/2024 68.947 kg  152 lb  Stated    2/12/2025 65.318 kg  144 lb       Weight change: No significant changes    Nutrition Focused Physical Exam    Date: 02/13/25     Patient meets criteria for malnutrition diagnosis, see MSA note.     Subjective   Reported/Observed/Food/Nutrition Related History:   02/13/25  Patient screened per nutrition protocol for MST score of 2 or greater and consult received. Presented for generalized weakness. Patient reported poor appetite and PO intake over the past week at least. Said her last known weight is 144#, unsure of any weight loss (maybe 1-2#). Stated she has " been nauseous without emesis. Noted patient with zofran and simethicone in place. Declined any BM in a few days (normally has 1-2 per day). Normally drinks 1/2-1 boost per day PTA. Agreeable to boost original chocolate once per day. Declined chewing or swallowing difficulties. NKFA.    Current Nutrition Prescription   PO: Diet: Regular/House; Fluid Consistency: Thin (IDDSI 0)  Oral Nutrition Supplement: n/a  Intake: 2/13 B 50% PO intake documented    Assessment & Plan   Nutrition Diagnosis   Date:   Updated:  Problem Malnutrition, acute severe   Etiology Inability to take in adequate energy 2/2 adv age, GI discomfort   Signs/Symptoms </=50% of EEN x >/=5d, moderate muscle wasting, and mild subcutaneous fat loss   Status: New    Goal:   Nutrition to support treatment and Increase intake, Continue positive trend    Nutrition Intervention      Follow treatment progress, Care plan reviewed, Interview for preferences, Encourage intake, Supplement provided    Nutrition POC  Encourage adequate PO intake as able  Ordering boost original chocolate once per day    Monitoring/Evaluation:   Per protocol, PO intake, Supplement intake, Weight, GI status, Symptoms, POC/GOC    Jessica Lujan, MS,RD,LD  Time Spent: 30min

## 2025-02-13 NOTE — PROGRESS NOTES
[facility] Medicine Services  PROGRESS NOTE    Patient Name: Monique Betts  : 1935  MRN: [id number]    Date of Admission: 2025  Primary Care Physician: Sena Lomeli MD    Subjective   Subjective     CC:  Mental status changes, hallucinations    HPI:  Seems back to baseline, asking about when she can go home      Objective   Objective     Vital Signs:   Temp:  [97.7 °F (36.5 °C)-98.6 °F (37 °C)] 97.9 °F (36.6 °C)  Heart Rate:  [49-73] 57  Resp:  [14-16] 16  BP: (111-144)/(43-55) 144/55     Physical Exam:  Constitutional: No acute distress, awake, alert  HENT: NCAT, mucous membranes moist  Respiratory: Respiratory effort normal   Cardiovascular: RRR, no murmurs, rubs, or gallops  Gastrointestinal: Soft, nontender, nondistended  Musculoskeletal: No bilateral ankle edema  Psychiatric: Appropriate affect, cooperative  Neurologic: Oriented x 2-3, speech clear  Skin: No rashes      Results Reviewed:  LAB RESULTS:      Lab 25  1052 25  0845 25  0721   WBC 5.39 4.57  --   --  4.85   HEMOGLOBIN 12.8 12.0  --   --  13.5   HEMATOCRIT 39.0 36.2  --   --  40.0   PLATELETS 141 122*  --   --  147   NEUTROS ABS  --   --   --   --  2.53   IMMATURE GRANS (ABS)  --   --   --   --  0.01   LYMPHS ABS  --   --   --   --  1.77   MONOS ABS  --   --   --   --  0.47   EOS ABS  --   --   --   --  0.04   MCV 95.4 93.8  --   --  92.8   CRP  --   --   --   --  <0.30   PROCALCITONIN  --   --   --   --  0.04   LACTATE  --   --   --   --  0.9   LDH  --   --   --  198  --    PROTIME  --   --  12.6  --   --    HSTROP T  --   --   --  20* 20*         Lab 252 25  0721   SODIUM 140 140 141   POTASSIUM 4.0 4.0 3.9   CHLORIDE 107 106 104   CO2 26.0 24.0 25.0   ANION GAP 7.0 10.0 12.0   BUN 17 12 15   CREATININE 1.13* 0.89 1.01*   EGFR 46.9* 62.4 53.7*   GLUCOSE 113* 101* 130*   CALCIUM 8.7 8.6 9.4   MAGNESIUM 2.2 2.2 2.2    PHOSPHORUS  --   --  3.3   HEMOGLOBIN A1C  --   --  6.00*   TSH  --   --  2.450         Lab 02/12/25  0721   TOTAL PROTEIN 6.8   ALBUMIN 4.3   GLOBULIN 2.5   ALT (SGPT) 16   AST (SGOT) 19   BILIRUBIN 0.4   ALK PHOS 62   LIPASE 285*         Lab 02/12/25  1052 02/12/25  0845 02/12/25  0721   PROBNP  --   --  359.2   HSTROP T  --  20* 20*   PROTIME 12.6  --   --    INR 0.93  --   --          Lab 02/12/25  0721   CHOLESTEROL 147   LDL CHOL 65   HDL CHOL 61*   TRIGLYCERIDES 117             Lab 02/12/25  1024   PH, ARTERIAL 7.427   PCO2, ARTERIAL 40.9   PO2 ART 75.6*   FIO2 21   HCO3 ART 26.9*   BASE EXCESS ART 2.3*   CARBOXYHEMOGLOBIN 1.3     Brief Urine Lab Results  (Last result in the past 365 days)        Color   Clarity   Blood   Leuk Est   Nitrite   Protein   CREAT   Urine HCG        02/12/25 0734 Yellow   Clear   Negative   Negative   Negative   Negative                   Microbiology Results Abnormal       None            No radiology results from the last 24 hrs    Results for orders placed during the hospital encounter of 10/25/24    Adult Transthoracic Echo Complete W/ Cont if Necessary Per Protocol    Interpretation Summary    Left ventricular ejection fraction appears to be 56 - 60%.    Estimated right ventricular systolic pressure from tricuspid regurgitation is normal (<35 mmHg).    No significant valvular disease      Current medications:  Scheduled Meds:aspirin, 81 mg, Oral, Daily  donepezil, 10 mg, Oral, Daily  enoxaparin, 40 mg, Subcutaneous, Daily  losartan, 25 mg, Oral, Q24H  melatonin, 10 mg, Oral, Nightly  polyethylene glycol, 17 g, Oral, BID  pregabalin, 50 mg, Oral, TID  QUEtiapine, 25 mg, Oral, Nightly  rosuvastatin, 10 mg, Oral, Daily  simethicone, 80 mg, Oral, BID  sodium chloride, 10 mL, Intravenous, Q12H      Continuous Infusions:   PRN Meds:.  acetaminophen **OR** acetaminophen **OR** acetaminophen    senna-docusate sodium **AND** polyethylene glycol **AND** bisacodyl **AND** bisacodyl     Calcium Replacement - Follow Nurse / BPA Driven Protocol    fluticasone    Magnesium Standard Dose Replacement - Follow Nurse / BPA Driven Protocol    meclizine    nitroglycerin    ondansetron ODT    Phosphorus Replacement - Follow Nurse / BPA Driven Protocol    Potassium Replacement - Follow Nurse / BPA Driven Protocol    sodium chloride    sodium chloride    Assessment & Plan   Assessment & Plan     Active Hospital Problems    Diagnosis  POA    Severe protein-calorie malnutrition [E43]  Yes    Abdominal pain [R10.9]  Yes    Elevated lipase [R74.8]  Unknown    Dementia without behavioral disturbance [F03.90]  Unknown    Social problem [Z60.9]  Not Applicable    Delirium [R41.0]  Unknown    Hallucination [R44.3]  Unknown    Protein calorie malnutrition [E46]  Unknown    GERD without esophagitis [K21.9]  Yes    Essential hypertension [I10]  Yes    Dyslipidemia, goal LDL below 70 [E78.5]  Yes    Coronary artery disease involving native coronary artery of native heart without angina pectoris [I25.10]  Yes      Resolved Hospital Problems   No resolved problems to display.        Brief Hospital Course to date:  Monique Betts is a 89 y.o. female with a PMH significant for CAD s/p PCI, chronic back pain, chronic urinary incontinence s/p bladder stimulator, dementia with cognitive decline as well as left hip and left shoulder osteoarthritis.  Admitted with significant generalized weakness in her legs and upper extremities, endorsing mild suprapubic and LLQ abdominal pain (does not recall onset of pain), with mild nausea without vomiting and subjective chills.  Noted to have lack of appetite with significant decline in fluid intake, + hallucinations..     This patient's problems and plans were partially entered by my partner and updated as appropriate by me 02/15/25.      Nausea, diarrhea, abdominal pain -> resolved  -Possible viral illness PTA, now resolved, patient eating  -CT imaging negative       Dementia  Visual/Auditory Hallucinations   -Patient started on Seroquel last admit in Oct/Nov '24, this was continued after her DC from [facility]  -Patient states she hasn't been taking it the past few days -? likely the cause of her hallucinations vs progression of her dementia   -UA negative, CT head negative  -Delirium precautions     CAD s/p PCI  -Continue home statin and aspirin    Constipation  -add scheduled miralax     HTN  -Continue Losartan      Urinary Incontinence  -S/P bladder stimulator per Dr Smith 8/2023   -Has paperwork and remote with her  -Has had issues with retention since admission      H/o chronic back pain, L5/S1 radiculopathy  Left hip and left shoulder arthritis with pain  -Follows with [facility], supposed to have an Epidural injection soon, will need to reschedule her appt  -PT/OT recommending SNF  -plan for [facility] v transition to LTC, needs 24/7 care    Discussed with patient's son over the phone regarding medical readiness.  He states he is not able to come and get her but he has made arrangements for her to be moved to [facility] on Monday.  He is unwilling to come pick her up over the weekend because he says it is going to rain 6 inches and he has a farm in a family and does not want to move furniture in the rain.      Expected Discharge Location and Transportation: home  Expected Discharge   Expected Discharge Date: 2/17/2025; Expected Discharge Time:      VTE Prophylaxis:  Pharmacologic & mechanical VTE prophylaxis orders are present.         AM-PAC 6 Clicks Score (PT): 19 (02/14/25 2000)    CODE STATUS:   Code Status and Medical Interventions: No CPR (Do Not Attempt to Resuscitate); Limited Support; No intubation (DNI)   Ordered at: 02/12/25 1005     Medical Intervention Limits:    No intubation (DNI)     Level Of Support Discussed With:    Health Care Surrogate    Patient     Code Status (Patient has no pulse and is not breathing):    No CPR (Do Not Attempt to Resuscitate)      Medical Interventions (Patient has pulse or is breathing):    Limited Support       Octavia Gracia, DO  02/15/25

## 2025-02-13 NOTE — PROGRESS NOTES
Malnutrition Severity Assessment    Patient Name:  Monique Betts  YOB: 1935  MRN: [id number]  Admit Date:  2/12/2025    Patient meets criteria for : Severe Malnutrition    Malnutrition Severity Assessment  Malnutrition Type: Acute Disease or Injury - Related Malnutrition  Malnutrition Type (Last 8 Hours)       Malnutrition Severity Assessment       Row Name 02/13/25 1326       Malnutrition Severity Assessment    Malnutrition Type Acute Disease or Injury - Related Malnutrition      Row Name 02/13/25 1326       Insufficient Energy Intake     Insufficient Energy Intake Findings Severe    Insufficient Energy Intake  < or equal to 50% of est. energy requirement for > or equal to 5d)      Row Name 02/13/25 1326       Muscle Loss    Loss of Muscle Mass Findings Moderate    [place] Region Moderate - slight depression  mild    Clavicle Bone Region --  mild    Acromion Bone Region None    Dorsal Hand Region None    Patellar Region --  mild    Anterior Thigh Region --  mild    Posterior Calf Region Moderate - some roundness, slight firmness      Row Name 02/13/25 1326       Fat Loss    Subcutaneous Fat Loss Findings Mild    Orbital Region  --  mild    Upper Arm Region --  mild      Row Name 02/13/25 1326       Declining Functional Status    Declining Functional Status Findings N/A      Row Name 02/13/25 1326       Criteria Met (Must meet criteria for severity in at least 2 of these categories: M Wasting, Fat Loss, Fluid, Secondary Signs, Wt. Status, Intake)    Patient meets criteria for  Severe Malnutrition                  Electronically signed by:  Jessica Lujan MS,RD,LD  02/13/25 13:29 EST

## 2025-02-13 NOTE — THERAPY EVALUATION
Patient Name: Monique Betts  : 1935    MRN: [id number]                              Today's Date: 2025       Admit Date: 2025    Visit Dx:     ICD-10-CM ICD-9-CM   1. Acute pancreatitis without infection or necrosis, unspecified pancreatitis type  K85.90 577.0   2. Encephalopathy acute  G93.40 348.30   3. Dementia with agitation, unspecified dementia severity, unspecified dementia type  F03.911 294.21   4. Essential hypertension  I10 401.9   5. Cognitive decline  R41.89 294.9   6. Failure to thrive in adult  R62.7 783.7   7. Coronary artery disease involving native coronary artery of native heart without angina pectoris  I25.10 414.01     Patient Active Problem List   Diagnosis    NSTEMI (non-ST elevated myocardial infarction)    COVID-19 virus infection    Coronary artery disease involving native coronary artery of native heart without angina pectoris    Dyslipidemia, goal LDL below 70    Essential hypertension    Failure to thrive in adult    Cognitive decline    GERD without esophagitis    Pancreatitis    Abdominal pain    Elevated lipase    Dementia without behavioral disturbance    Social problem    Delirium    Hallucination    Protein calorie malnutrition     Past Medical History:   Diagnosis Date    Cancer     Heart murmur     Skin Cancer    Sleep apnea     Not using machine for several years.     Past Surgical History:   Procedure Laterality Date    CARDIAC CATHETERIZATION N/A 2022    Procedure: LEFT HEART CATH;  Surgeon: Antonino Koo MD;  Location: [facility] CATH INVASIVE LOCATION;  Service: Cardiovascular;  Laterality: N/A;      General Information       Row Name 25 1333          OT Time and Intention    Document Type evaluation  -CS     Mode of Treatment occupational therapy  -CS       Row Name 25 1333          General Information    Patient Profile Reviewed yes  -CS     Prior Level of Function independent:;all household  mobility;feeding;grooming;dressing;min assist:;bathing;dependent:;driving  Per Pt and Son, Pt owns RW and Rollator, bath seating in place in home environment. Current plan is [facility] for trial period with potential return to home with 24/7 care arranged.  -CS     Existing Precautions/Restrictions fall;other (see comments)  dementia; LBP; bladder stimulator  -CS     Barriers to Rehab medically complex;previous functional deficit  -CS       Row Name 02/13/25 1333          Living Environment    People in Home alone  -CS       Row Name 02/13/25 1333          Home Main Entrance    Number of Stairs, Main Entrance one  -CS     Stair Railings, Main Entrance railings safe and in good condition  -CS       Row Name 02/13/25 1333          Cognition    Orientation Status (Cognition) oriented x 3  -CS       Row Name 02/13/25 1333          Safety Issues/Impairments Affecting Functional Mobility    Safety Issues Affecting Function (Mobility) awareness of need for assistance;insight into deficits/self-awareness;safety precaution awareness;safety precautions follow-through/compliance;sequencing abilities  -CS     Impairments Affecting Function (Mobility) balance;cognition;endurance/activity tolerance;strength;postural/trunk control  -CS     Cognitive Impairments, Mobility Safety/Performance insight into deficits/self-awareness;safety precaution awareness;safety precaution follow-through  -CS               User Key  (r) = Recorded By, (t) = Taken By, (c) = Cosigned By      Initials Name Provider Type    CS Marino Booker OT Occupational Therapist                     Mobility/ADL's       Row Name 02/13/25 1340          Bed Mobility    Comment, (Bed Mobility) UIC, returned to chair  -       Row Name 02/13/25 1340          Transfers    Transfers sit-stand transfer  -     Comment, (Transfers) increased assist to Petra for MIP activity with one LOB  -       Row Name 02/13/25 1340          Sit-Stand Transfer    Sit-Stand  [place] (Transfers) contact guard  -CS       Row Name 02/13/25 1340          Functional Mobility    Functional Mobility- Comment defer to PT for specifics  -CS     Patient was able to Ambulate yes  -CS       Row Name 02/13/25 1340          Activities of Daily Living    BADL Assessment/Intervention grooming;feeding;toileting  -CS       Row Name 02/13/25 1340          Grooming Assessment/Training    [place] Level (Grooming) grooming skills;set up  -CS     Position (Grooming) supported sitting  -CS       Row Name 02/13/25 1340          Self-Feeding Assessment/Training    [place] Level (Feeding) prepare tray/open items;minimum assist (75% patient effort);feeding skills;independent  -CS     Position (Feeding) supported sitting  -CS       Row Name 02/13/25 1340          Toileting Assessment/Training    [place] Level (Toileting) toileting skills;other (see comments)  -CS     Comment, (Toileting) bladder stimulator  -CS               User Key  (r) = Recorded By, (t) = Taken By, (c) = Cosigned By      Initials Name Provider Type    Marino Ch, OT Occupational Therapist                   Obj/Interventions       Row Name 02/13/25 1342          Sensory Assessment (Somatosensory)    Sensory Assessment (Somatosensory) bilateral UE  -CS     Bilateral UE Sensory Assessment general sensation;light touch awareness;light touch localization;intact  -CS       Row Name 02/13/25 1342          Vision Assessment/Intervention    Visual Impairment/Limitations WFL  -CS     Vision Assessment Comment full to confrontation testing, tracking intact  -CS       Row Name 02/13/25 1342          Range of Motion Comprehensive    General Range of Motion bilateral upper extremity ROM WFL  -       Row Name 02/13/25 1342          Strength Comprehensive (MMT)    General Manual Muscle Testing (MMT) Assessment upper extremity strength deficits identified  -CS     Comment, General Manual Muscle Testing (MMT) Assessment BUE grossly  4/5, symmetrical  -CS       Row Name 02/13/25 1342          Motor Skills    Motor Skills coordination;functional endurance  -CS     Coordination bimanual skills;WFL  -CS     Functional Endurance moderate to fair, stable on RA  -CS       Row Name 02/13/25 1342          Balance    Balance Assessment sitting static balance;sitting dynamic balance;standing static balance;standing dynamic balance  -CS     Static Sitting Balance supervision  -CS     Dynamic Sitting Balance standby assist  -CS     Position, Sitting Balance unsupported;sitting edge of bed  -CS     Static Standing Balance contact guard  -CS     Dynamic Standing Balance minimal assist  -CS     Position/Device Used, Standing Balance supported;unsupported  -CS     Balance Interventions sitting;standing;sit to stand;occupation based/functional task;other (see comments)  -CS     Comment, Balance increased assist (R hand held and LOB correction) required for dynamic MIP activity  -CS               User Key  (r) = Recorded By, (t) = Taken By, (c) = Cosigned By      Initials Name Provider Type     Marino Booker, OT Occupational Therapist                   Goals/Plan       Row Name 02/13/25 1347          Transfer Goal 1 (OT)    Activity/Assistive Device (Transfer Goal 1, OT) toilet;bed-to-chair/chair-to-bed  -CS     [place] Level/Cues Needed (Transfer Goal 1, OT) modified independence  -CS     Time Frame (Transfer Goal 1, OT) short term goal (STG);5 days  -CS     Strategies/Barriers (Transfers Goal 1, OT) increased safety, balance deficit  -CS     Progress/Outcome (Transfer Goal 1, OT) new goal  -[facility] Name 02/13/25 1347          Dressing Goal 1 (OT)    Activity/Device (Dressing Goal 1, OT) lower body dressing  -CS     [place]/Cues Needed (Dressing Goal 1, OT) contact guard required  -CS     Time Frame (Dressing Goal 1, OT) long term goal (LTG);1 week  -CS     Strategies/Barriers (Dressing Goal 1, OT) mitigate safety awareness and strength  deficit  -CS     Progress/Outcome (Dressing Goal 1, OT) new goal  -CS       Row Name 02/13/25 1347          Grooming Goal 1 (OT)    Activity/Device (Grooming Goal 1, OT) hair care;oral care;wash face, hands  -CS     [place] (Grooming Goal 1, OT) supervision required  -CS     Time Frame (Grooming Goal 1, OT) short term goal (STG);5 days  -CS     Strategies/Barriers (Grooming Goal 1, OT) sinkside standing tolerance/endurance for 5 minute task(s)  -CS     Progress/Outcome (Grooming Goal 1, OT) new goal  -CS       Row Name 02/13/25 1347          Therapy Assessment/Plan (OT)    Planned Therapy Interventions (OT) activity tolerance training;functional balance retraining;occupation/activity based interventions;ROM/therapeutic exercise;strengthening exercise;transfer/mobility retraining;patient/caregiver education/training;neuromuscular control/coordination retraining;cognitive/visual perception retraining;BADL retraining;adaptive equipment training  -CS               User Key  (r) = Recorded By, (t) = Taken By, (c) = Cosigned By      Initials Name Provider Type    CS Marino Booker, OT Occupational Therapist                   Clinical Impression       Row Name 02/13/25 134          Pain Assessment    Additional Documentation Pain Scale: FACES Pre/Post-Treatment (Group)  -[facility] Name 02/13/25 1343          Pain Scale: FACES Pre/Post-Treatment    Pain: FACES Scale, Pretreatment 0-->no hurt  -CS     Posttreatment Pain Rating 0-->no hurt  -CS       [facility] Name 02/13/25 1343          Plan of Care Review    Plan of Care Reviewed With patient;son  -CS     Progress no change  -CS     Outcome Evaluation Safe and Independent ADL completion limited by strength, balance, and cognitive deficits warranting IP OT services to promote return to PLOF. No abdominal pain reported with ADL related mobility, increased assist for standing with LOB noted during MIP. Per son, plan is [facility] for a period of time with potential return home  and 24/7 care. OT endorses family plan, Pt would benefit from AD during mobility for decreased fall risk, defer to PT for rec.  -CS       Row Name 02/13/25 1344          Therapy Assessment/Plan (OT)    Patient/Family Therapy Goal Statement (OT) return home, fell stronger, gain Ind with ADLs  -CS     Rehab Potential (OT) good  -CS     Criteria for Skilled Therapeutic Interventions Met (OT) yes;meets criteria;skilled treatment is necessary  -CS     Therapy Frequency (OT) daily  -CS       Row Name 02/13/25 1344          Therapy Plan Review/Discharge Plan (OT)    Anticipated Discharge Disposition (OT) assisted living  -CS       Row Name 02/13/25 1344          Vital Signs    Pre Systolic BP Rehab --  RN cleared for eval,  -CS     O2 Delivery Pre Treatment room air  -CS     O2 Delivery Intra Treatment room air  -CS     O2 Delivery Post Treatment room air  -CS     Pre Patient Position Sitting  -CS     Intra Patient Position Standing  -CS     Post Patient Position Sitting  -CS       Row Name 02/13/25 1347          Positioning and Restraints    Pre-Treatment Position sitting in chair/recliner  -CS     Post Treatment Position chair  -CS     In Chair notified nsg;reclined;sitting;call light within reach;encouraged to call for assist;exit alarm on;waffle cushion;legs elevated;heels elevated  -CS               User Key  (r) = Recorded By, (t) = Taken By, (c) = Cosigned By      Initials Name Provider Type    CS Marino Booker, OT Occupational Therapist                   Outcome Measures       Row Name 02/13/25 1236          How much help from another is currently needed...    Putting on and taking off regular lower body clothing? 2  -CS     Bathing (including washing, rinsing, and drying) 2  -CS     Toileting (which includes using toilet bed pan or urinal) 3  -CS     Putting on and taking off regular upper body clothing 3  -CS     Taking care of personal grooming (such as brushing teeth) 3  -CS     Eating meals 4  -CS      AM-PAC 6 Clicks Score (OT) 17  -       Row Name 02/13/25 0954          How much help from another person do you currently need...    Turning from your back to your side while in flat bed without using bedrails? 4  -LW     Moving from lying on back to sitting on the side of a flat bed without bedrails? 3  -LW     Moving to and from a bed to a chair (including a wheelchair)? 3  -LW     Standing up from a chair using your arms (e.g., wheelchair, bedside chair)? 3  -LW     Climbing 3-5 steps with a railing? 3  -LW     To walk in hospital room? 3  -LW     AM-PAC 6 Clicks Score (PT) 19  -LW     Highest Level of Mobility Goal 6 --> Walk 10 steps or more  -       Row Name 02/13/25 1349 02/13/25 0954       Functional Assessment    Outcome Measure Options AM-PAC 6 Clicks Daily Activity (OT)  -CS AM-PAC 6 Clicks Basic Mobility (PT)  -LW              User Key  (r) = Recorded By, (t) = Taken By, (c) = Cosigned By      Initials Name Provider Type     Marino Booker OT Occupational Therapist    LW Astrid Lizarraga, PT Physical Therapist                    Occupational Therapy Education       Title: PT OT SLP Therapies (In Progress)       Topic: Occupational Therapy (In Progress)       Point: ADL training (Done)       Description:   Instruct learner(s) on proper safety adaptation and remediation techniques during self care or transfers.   Instruct in proper use of assistive devices.                  Learning Progress Summary            Patient Acceptance, E,D, VU,DU by  at 2/13/2025 1349   Family Acceptance, E,D, VU,DU by  at 2/13/2025 1349                      Point: Home exercise program (Not Started)       Description:   Instruct learner(s) on appropriate technique for monitoring, assisting and/or progressing therapeutic exercises/activities.                  Learner Progress:  Not documented in this visit.              Point: Precautions (Done)       Description:   Instruct learner(s) on prescribed precautions during  self-care and functional transfers.                  Learning Progress Summary            Patient Acceptance, E,D, VU,DU by  at 2/13/2025 1349   Family Acceptance, E,D, VU,DU by  at 2/13/2025 1349                      Point: Body mechanics (Done)       Description:   Instruct learner(s) on proper positioning and spine alignment during self-care, functional mobility activities and/or exercises.                  Learning Progress Summary            Patient Acceptance, E,D, VU,DU by  at 2/13/2025 1349   Family Acceptance, E,D, VU,DU by  at 2/13/2025 1349                                      User Key       Initials Effective Dates Name Provider Type Discipline     06/16/21 -  Marino Booker, OT Occupational Therapist OT                  OT Recommendation and Plan  Planned Therapy Interventions (OT): activity tolerance training, functional balance retraining, occupation/activity based interventions, ROM/therapeutic exercise, strengthening exercise, transfer/mobility retraining, patient/caregiver education/training, neuromuscular control/coordination retraining, cognitive/visual perception retraining, BADL retraining, adaptive equipment training  Therapy Frequency (OT): daily  Plan of Care Review  Plan of Care Reviewed With: patient, son  Progress: no change  Outcome Evaluation: Safe and Independent ADL completion limited by strength, balance, and cognitive deficits warranting IP OT services to promote return to PLOF. No abdominal pain reported with ADL related mobility, increased assist for standing with LOB noted during MIP. Per son, plan is [facility]-way for a period of time with potential return home and 24/7 care. OT endorses family plan, Pt would benefit from AD during mobility for decreased fall risk, defer to PT for rec.     Time Calculation:   Evaluation Complexity (OT)  Review Occupational Profile/Medical/Therapy History Complexity: expanded/moderate complexity  Assessment, Occupational  Performance/Identification of Deficit Complexity: 3-5 performance deficits  Clinical Decision Making Complexity (OT): detailed assessment/moderate complexity  Overall Complexity of Evaluation (OT): moderate complexity     Time Calculation- OT       Row Name 02/13/25 1349             Time Calculation- OT    OT Start Time 1302  -CS      OT Received On 02/13/25  -CS      OT Goal Re-Cert Due Date 02/23/25  -CS         Untimed Charges    OT Eval/Re-eval Minutes 49  -CS         Total Minutes    Untimed Charges Total Minutes 49  -CS       Total Minutes 49  -CS                User Key  (r) = Recorded By, (t) = Taken By, (c) = Cosigned By      Initials Name Provider Type    CS Marino Booker, OT Occupational Therapist                  Therapy Charges for Today       Code Description Service Date Service Provider Modifiers Qty    [id number] HC OT EVAL MOD COMPLEXITY 4 2/13/2025 Marino Booker OT GO 1                 Marino Booker OT  2/13/2025

## 2025-02-13 NOTE — PLAN OF CARE
Goal Outcome Evaluation:  Plan of Care Reviewed With: patient, son        Progress: no change  Outcome Evaluation: Safe and Independent ADL completion limited by strength, balance, and cognitive deficits warranting IP OT services to promote return to PLOF. No abdominal pain reported with ADL related mobility, increased assist for standing with LOB noted during MIP. Per son, plan is [facility] for a period of time with potential return home and 24/7 care. OT endorses family plan, Pt would benefit from AD during mobility for decreased fall risk, defer to PT for rec.    Anticipated Discharge Disposition (OT): assisted living

## 2025-02-13 NOTE — THERAPY EVALUATION
Patient Name: Monique Betts  : 1935    MRN: [id number]                              Today's Date: 2025       Admit Date: 2025    Visit Dx:     ICD-10-CM ICD-9-CM   1. Acute pancreatitis without infection or necrosis, unspecified pancreatitis type  K85.90 577.0   2. Encephalopathy acute  G93.40 348.30   3. Dementia with agitation, unspecified dementia severity, unspecified dementia type  F03.911 294.21   4. Essential hypertension  I10 401.9   5. Cognitive decline  R41.89 294.9   6. Failure to thrive in adult  R62.7 783.7   7. Coronary artery disease involving native coronary artery of native heart without angina pectoris  I25.10 414.01     Patient Active Problem List   Diagnosis    NSTEMI (non-ST elevated myocardial infarction)    COVID-19 virus infection    Coronary artery disease involving native coronary artery of native heart without angina pectoris    Dyslipidemia, goal LDL below 70    Essential hypertension    Failure to thrive in adult    Cognitive decline    GERD without esophagitis    Pancreatitis    Abdominal pain    Elevated lipase    Dementia without behavioral disturbance    Social problem    Delirium    Hallucination    Protein calorie malnutrition     Past Medical History:   Diagnosis Date    Cancer     Heart murmur     Skin Cancer    Sleep apnea     Not using machine for several years.     Past Surgical History:   Procedure Laterality Date    CARDIAC CATHETERIZATION N/A 2022    Procedure: LEFT HEART CATH;  Surgeon: Antonino Koo MD;  Location: [facility] CATH INVASIVE LOCATION;  Service: Cardiovascular;  Laterality: N/A;      General Information       Row Name 25 0941          Physical Therapy Time and Intention    Document Type evaluation  -LW     Mode of Treatment physical therapy  -LW       Row Name 25 0941          General Information    Patient Profile Reviewed yes  -LW     Prior Level of Function independent:;all household mobility;gait;transfer;min  assist:;ADL's;bathing;using stairs;dependent:;driving;shopping  [facility] present for details of history  -     Existing Precautions/Restrictions fall;other (see comments)  dementia; LBP; bladder stimlulator; per [facility] plan is to not return home alone for safety  -     Barriers to Rehab medically complex;cognitive status  -       Row Name 02/13/25 0941          Living Environment    People in Home alone  -       Row Name 02/13/25 0941          Home Main Entrance    Number of Stairs, Main Entrance one  -     Stair Railings, Main Entrance railings on both sides of stairs  -       Row Name 02/13/25 0941          Cognition    Orientation Status (Cognition) oriented x 3  -       Row Name 02/13/25 0938          Safety Issues/Impairments Affecting Functional Mobility    Safety Issues Affecting Function (Mobility) ability to follow commands;awareness of need for assistance;impulsivity;insight into deficits/self-awareness;judgment;problem-solving;safety precaution awareness;safety precautions follow-through/compliance  -     Impairments Affecting Function (Mobility) balance;cognition;endurance/activity tolerance;motor control;strength  -     Cognitive Impairments, Mobility Safety/Performance attention;insight into deficits/self-awareness;problem-solving/reasoning;safety precaution awareness;safety precaution follow-through  -               User Key  (r) = Recorded By, (t) = Taken By, (c) = Cosigned By      Initials Name Provider Type    LW Astrid Lizarraga PT Physical Therapist                   Mobility       Row Name 02/13/25 4137          Bed Mobility    Bed Mobility scooting/bridging;supine-sit  -     Scooting/Bridging [place] (Bed Mobility) standby assist;1 person assist  -LW     Supine-Sit [place] (Bed Mobility) standby assist;1 person assist  -     Assistive Device (Bed Mobility) bed rails;head of bed elevated  -       Row Name 02/13/25 0950          Bed-Chair Transfer     Bed-Chair [place] (Transfers) minimum assist (75% patient effort);1 person assist  -LW     Comment, (Bed-Chair Transfer) bed>BSC, Pt required setupA for noreen-care and encouragement to wash hands  -LW       Row Name 02/13/25 0945          Sit-Stand Transfer    Sit-Stand [place] (Transfers) contact guard;1 person assist  -LW       Row Name 02/13/25 0945          Gait/Stairs (Locomotion)    [place] Level (Gait) minimum assist (75% patient effort);1 person assist;other (see comments)  CGA<>Daniel  -LW     Patient was able to Ambulate yes  -LW     Distance in Feet (Gait) 100  -LW     Deviations/Abnormal Patterns (Gait) bilateral deviations;base of support, narrow;gait speed decreased;fausto decreased;stride length decreased;weight shifting decreased  -LW     Bilateral Gait Deviations decreased arm swing;forward flexed posture;heel strike decreased  -LW     [place] Level (Stairs) minimum assist (75% patient effort);1 person assist  -LW     Handrail Location (Stairs) right side (ascending)  -LW     Number of Steps (Stairs) 2  -LW     Ascending Technique (Stairs) step-over-step  -LW     Descending Technique (Stairs) step-over-step  -LW     Stairs, Safety Issues balance decreased during turns;weight-shifting ability decreased  -LW     Stairs, Impairments strength decreased;impaired balance;motor control impaired  -LW     Comment, (Gait/Stairs) Pt ambulated slow and guarded, reaching for walls and doors. Ocassional Daniel for obstacle avoidance and balance during turns.  -LW               User Key  (r) = Recorded By, (t) = Taken By, (c) = Cosigned By      Initials Name Provider Type    LW Astrid Lizarraga PT Physical Therapist                   Obj/Interventions       Row Name 02/13/25 0969          Range of Motion Comprehensive    General Range of Motion no range of motion deficits identified  -LW       Row Name 02/13/25 0971          Strength Comprehensive (MMT)    General Manual Muscle Testing (MMT)  Assessment lower extremity strength deficits identified  -LW     Comment, General Manual Muscle Testing (MMT) Assessment grossly observed 3+/5 BLE  -LW       Row Name 02/13/25 0948          Advanced Stepping/Walking Interventions    Stepping/Walking Interventions side stepping  -LW       Row Name 02/13/25 0948          Balance    Balance Assessment sitting static balance;sitting dynamic balance;standing static balance;standing dynamic balance  -LW     Static Sitting Balance supervision;1-person assist  -LW     Dynamic Sitting Balance standby assist;1-person assist  -LW     Position, Sitting Balance unsupported;sitting in chair;other (see comments)  BSC  -LW     Static Standing Balance contact guard;1-person assist  -LW     Dynamic Standing Balance minimal assist;1-person assist;other (see comments)  ranges CGA<>Daniel  -LW     Position/Device Used, Standing Balance unsupported  -LW     Balance Interventions sitting;standing;sit to stand;static;dynamic;minimal challenge;occupation based/functional task  -LW               User Key  (r) = Recorded By, (t) = Taken By, (c) = Cosigned By      Initials Name Provider Type    LW Astrid Lizarraga, PT Physical Therapist                   Goals/Plan       Row Name 02/13/25 0953          Transfer Goal 1 (PT)    Activity/Assistive Device (Transfer Goal 1, PT) sit-to-stand/stand-to-sit;bed-to-chair/chair-to-bed  -LW     [place] Level/Cues Needed (Transfer Goal 1, PT) modified independence  -LW     Time Frame (Transfer Goal 1, PT) short term goal (STG);5 days  -LW       Row Name 02/13/25 0953          Gait Training Goal 1 (PT)    Activity/Assistive Device (Gait Training Goal 1, PT) gait (walking locomotion);other (see comments)  LRAD  -LW     [place] Level (Gait Training Goal 1, PT) modified independence  -LW     Distance (Gait Training Goal 1, PT) 150  -LW     Time Frame (Gait Training Goal 1, PT) long term goal (LTG);10 days  -LW       Row Name 02/13/25 0953          Stairs  Goal 1 (PT)    Activity/Assistive Device (Stairs Goal 1, PT) ascending stairs;descending stairs;using handrail, left;using handrail, right  -LW     [place] Level/Cues Needed (Stairs Goal 1, PT) standby assist  -LW     Number of Stairs (Stairs Goal 1, PT) 3  -LW     Time Frame (Stairs Goal 1, PT) long term goal (LTG);10 days  -       Row Name 02/13/25 4350          Therapy Assessment/Plan (PT)    Planned Therapy Interventions (PT) balance training;bed mobility training;gait training;home exercise program;postural re-education;patient/family education;neuromuscular re-education;motor coordination training;ROM (range of motion);stair training;strengthening;stretching;transfer training  -LW               User Key  (r) = Recorded By, (t) = Taken By, (c) = Cosigned By      Initials Name Provider Type    LW Astrid Lizarraga, PT Physical Therapist                   Clinical Impression       [facility] Name 02/13/25 8938          Pain    Additional Documentation Pain Scale: FACES Pre/Post-Treatment (Group)  -LW       Row Name 02/13/25 5781          Pain Scale: FACES Pre/Post-Treatment    Pain: FACES Scale, Pretreatment 0-->no hurt  -LW     Posttreatment Pain Rating 0-->no hurt  -LW       [facility] Name 02/13/25 5914          Plan of Care Review    Plan of Care Reviewed With jason(milena);patient  -LW     Outcome Evaluation Pt presented below baseline with strength and endurance which impairs her functional mobility, she also has decreased safety awareness and decreased problem solving during mobility which are barriers to DC home alone. PT recommends SNF for optimal recovery.  -       Row Name 02/13/25 4460          Therapy Assessment/Plan (PT)    Patient/Family Therapy Goals Statement (PT) did not state  -LW     Rehab Potential (PT) fair  -LW     Criteria for Skilled Interventions Met (PT) yes;skilled treatment is necessary  -LW     Therapy Frequency (PT) daily  -LW     Predicted Duration of Therapy Intervention (PT) 10 days  -LW        Row Name 02/13/25 0950          Positioning and Restraints    Pre-Treatment Position in bed  -LW     Post Treatment Position chair  -LW     In Chair notified nsg;call light within reach;encouraged to call for assist;exit alarm on;with family/caregiver;waffle cushion;legs elevated  -               User Key  (r) = Recorded By, (t) = Taken By, (c) = Cosigned By      Initials Name Provider Type    Astrid Little PT Physical Therapist                   Outcome Measures       Row Name 02/13/25 0954          How much help from another person do you currently need...    Turning from your back to your side while in flat bed without using bedrails? 4  -LW     Moving from lying on back to sitting on the side of a flat bed without bedrails? 3  -LW     Moving to and from a bed to a chair (including a wheelchair)? 3  -LW     Standing up from a chair using your arms (e.g., wheelchair, bedside chair)? 3  -LW     Climbing 3-5 steps with a railing? 3  -LW     To walk in hospital room? 3  -LW     AM-PAC 6 Clicks Score (PT) 19  -LW     Highest Level of Mobility Goal 6 --> Walk 10 steps or more  -LW       Row Name 02/13/25 0954          Functional Assessment    Outcome Measure Options AM-PAC 6 Clicks Basic Mobility (PT)  -               User Key  (r) = Recorded By, (t) = Taken By, (c) = Cosigned By      Initials Name Provider Type    Astrid Little PT Physical Therapist                                 Physical Therapy Education       Title: PT OT SLP Therapies (In Progress)       Topic: Physical Therapy (In Progress)       Point: Mobility training (In Progress)       Learning Progress Summary            Patient Acceptance, E, NR by CALEB at 2/13/2025 0955   Family Acceptance, E, NR by CALEB at 2/13/2025 0955                      Point: Home exercise program (Not Started)       Learner Progress:  Not documented in this visit.              Point: Body mechanics (In Progress)       Learning Progress Summary            Patient  Acceptance, E, NR by LW at 2/13/2025 0955   Family Acceptance, E, NR by LW at 2/13/2025 0955                      Point: Precautions (In Progress)       Learning Progress Summary            Patient Acceptance, E, NR by LW at 2/13/2025 0955   Family Acceptance, E, NR by LW at 2/13/2025 0955                                      User Key       Initials Effective Dates Name Provider Type Discipline     11/15/24 -  Astrid Lizarraga, PT Physical Therapist PT                  PT Recommendation and Plan  Planned Therapy Interventions (PT): balance training, bed mobility training, gait training, home exercise program, postural re-education, patient/family education, neuromuscular re-education, motor coordination training, ROM (range of motion), stair training, strengthening, stretching, transfer training  Outcome Evaluation: Pt presented below baseline with strength and endurance which impairs her functional mobility, she also has decreased safety awareness and decreased problem solving during mobility which are barriers to DC home alone. PT recommends SNF for optimal recovery.     Time Calculation:   PT Evaluation Complexity  History, PT Evaluation Complexity: 3 or more personal factors and/or comorbidities  Examination of Body Systems (PT Eval Complexity): total of 4 or more elements  Clinical Presentation (PT Evaluation Complexity): stable  Clinical Decision Making (PT Evaluation Complexity): low complexity  Overall Complexity (PT Evaluation Complexity): low complexity     PT Charges       Row Name 02/13/25 0956             Time Calculation    Start Time 0905  -LW      PT Received On 02/13/25  -LW      PT Goal Re-Cert Due Date 02/23/25  -LW         Timed Charges    77981 - PT Therapeutic Activity Minutes 8  -LW         Untimed Charges    PT Eval/Re-eval Minutes 50  -LW         Total Minutes    Timed Charges Total Minutes 8  -LW      Untimed Charges Total Minutes 50  -LW       Total Minutes 58  -LW                User Key   (r) = Recorded By, (t) = Taken By, (c) = Cosigned By      Initials Name Provider Type    LW Astrid Lizarraga, PT Physical Therapist                  Therapy Charges for Today       Code Description Service Date Service Provider Modifiers Qty    21152888621  PT THERAPEUTIC ACT EA 15 MIN 2/13/2025 Astrid Lizarraga, PT GP 1    61640231447 HC PT EVAL LOW COMPLEXITY 4 2/13/2025 Astrid Lizarraga, PT GP 1            PT G-Codes  Outcome Measure Options: AM-PAC 6 Clicks Basic Mobility (PT)  AM-PAC 6 Clicks Score (PT): 19  PT Discharge Summary  Anticipated Discharge Disposition (PT): skilled nursing facility    Astrid Lizarraga PT  2/13/2025

## 2025-02-14 PROBLEM — E43 SEVERE PROTEIN-CALORIE MALNUTRITION: Status: ACTIVE | Noted: 2025-02-14

## 2025-02-14 LAB
ANION GAP SERPL CALCULATED.3IONS-SCNC: 7 MMOL/L (ref 5–15)
BUN SERPL-MCNC: 17 MG/DL (ref 8–23)
BUN/CREAT SERPL: 15 (ref 7–25)
CALCIUM SPEC-SCNC: 8.7 MG/DL (ref 8.6–10.5)
CHLORIDE SERPL-SCNC: 107 MMOL/L (ref 98–107)
CO2 SERPL-SCNC: 26 MMOL/L (ref 22–29)
CREAT SERPL-MCNC: 1.13 MG/DL (ref 0.57–1)
DEPRECATED RDW RBC AUTO: 45.4 FL (ref 37–54)
EGFRCR SERPLBLD CKD-EPI 2021: 46.9 ML/MIN/1.73
ERYTHROCYTE [DISTWIDTH] IN BLOOD BY AUTOMATED COUNT: 13 % (ref 12.3–15.4)
GLUCOSE SERPL-MCNC: 113 MG/DL (ref 65–99)
HCT VFR BLD AUTO: 39 % (ref 34–46.6)
HGB BLD-MCNC: 12.8 G/DL (ref 12–15.9)
MAGNESIUM SERPL-MCNC: 2.2 MG/DL (ref 1.6–2.4)
MCH RBC QN AUTO: 31.3 PG (ref 26.6–33)
MCHC RBC AUTO-ENTMCNC: 32.8 G/DL (ref 31.5–35.7)
MCV RBC AUTO: 95.4 FL (ref 79–97)
PLATELET # BLD AUTO: 141 10*3/MM3 (ref 140–450)
PMV BLD AUTO: 11.8 FL (ref 6–12)
POTASSIUM SERPL-SCNC: 4 MMOL/L (ref 3.5–5.2)
RBC # BLD AUTO: 4.09 10*6/MM3 (ref 3.77–5.28)
SODIUM SERPL-SCNC: 140 MMOL/L (ref 136–145)
WBC NRBC COR # BLD AUTO: 5.39 10*3/MM3 (ref 3.4–10.8)

## 2025-02-14 PROCEDURE — 25010000002 ENOXAPARIN PER 10 MG

## 2025-02-14 PROCEDURE — 80048 BASIC METABOLIC PNL TOTAL CA: CPT

## 2025-02-14 PROCEDURE — 85027 COMPLETE CBC AUTOMATED: CPT

## 2025-02-14 PROCEDURE — 83735 ASSAY OF MAGNESIUM: CPT

## 2025-02-14 PROCEDURE — 99232 SBSQ HOSP IP/OBS MODERATE 35: CPT | Performed by: NURSE PRACTITIONER

## 2025-02-14 RX ADMIN — ASPIRIN 81 MG 81 MG: 81 TABLET ORAL at 08:24

## 2025-02-14 RX ADMIN — SENNOSIDES AND DOCUSATE SODIUM 2 TABLET: 50; 8.6 TABLET ORAL at 08:32

## 2025-02-14 RX ADMIN — PREGABALIN 50 MG: 50 CAPSULE ORAL at 18:03

## 2025-02-14 RX ADMIN — ROSUVASTATIN 10 MG: 10 TABLET, FILM COATED ORAL at 08:24

## 2025-02-14 RX ADMIN — SIMETHICONE 80 MG: 80 TABLET, CHEWABLE ORAL at 08:24

## 2025-02-14 RX ADMIN — PREGABALIN 50 MG: 50 CAPSULE ORAL at 08:24

## 2025-02-14 RX ADMIN — Medication 10 MG: at 21:12

## 2025-02-14 RX ADMIN — PREGABALIN 50 MG: 50 CAPSULE ORAL at 21:12

## 2025-02-14 RX ADMIN — SIMETHICONE 80 MG: 80 TABLET, CHEWABLE ORAL at 21:12

## 2025-02-14 RX ADMIN — Medication 10 ML: at 21:13

## 2025-02-14 RX ADMIN — QUETIAPINE FUMARATE 25 MG: 25 TABLET ORAL at 21:12

## 2025-02-14 RX ADMIN — DONEPEZIL HYDROCHLORIDE 10 MG: 10 TABLET, FILM COATED ORAL at 08:24

## 2025-02-14 RX ADMIN — LOSARTAN POTASSIUM 25 MG: 50 TABLET, FILM COATED ORAL at 08:24

## 2025-02-14 RX ADMIN — ENOXAPARIN SODIUM 40 MG: 100 INJECTION SUBCUTANEOUS at 08:25

## 2025-02-14 NOTE — CASE MANAGEMENT/SOCIAL WORK
Continued Stay Note  [facility]     Patient Name: Monique Betts  MRN: [id number]  Today's Date: 2/14/2025    Admit Date: 2/12/2025    Plan: Home   Discharge Plan       Row Name 02/14/25 1219       Plan    Plan Home    Plan Comments I spoke with patient's son, Vivek to let him know she is medically ready to discharge but needs someone to be with her 24/day.  Vivek tells me he will be in her home. I encouraged him to reach out to [facility] assisted living as that was the discussed plans yesterday.   He plans to do this. I told him I would be glad to send any medical records to them, if they needed this. He is fine with a home health referral and have given it to Peace with [facility]. Vivek tells me patient has several rolling walkers.     IMM given to him today. I also spoke with sukhwinder at 14:00 as she had asked about [facility]. I asked Livia with [facility] to take a look at her records and she did not see anything that would qualify for acute rehab as no medical oversite was needed.         Final Discharge Disposition Code 06 - home with home health care                   Discharge Codes    No documentation.                 Expected Discharge Date and Time       Expected Discharge Date Expected Discharge Time    Feb 17, 2025               Jackelin Barbour RN

## 2025-02-14 NOTE — PROGRESS NOTES
[facility] Medicine Services  PROGRESS NOTE    Patient Name: Monique Betts  : 1935  MRN: [id number]    Date of Admission: 2025  Primary Care Physician: Sena Lomeli MD    Subjective   Subjective     CC:  F/u hallucinations, Nausea     HPI:  Patient is resting in bed in NAD. She is sleeping well. Tolerating diet.       Objective   Objective     Vital Signs:   Temp:  [97.9 °F (36.6 °C)-98.9 °F (37.2 °C)] 98 °F (36.7 °C)  Heart Rate:  [45-69] 61  Resp:  [15-18] 18  BP: (113-165)/(47-82) 115/60  Flow (L/min) (Oxygen Therapy):  [1] 1     Physical Exam:  Constitutional: No acute distress, awake, alert  HENT: NCAT, mucous membranes moist  Respiratory: Clear to auscultation bilaterally, respiratory effort normal room air 93%  Cardiovascular: RRR, no murmurs, rubs, or gallops  Gastrointestinal: Positive bowel sounds, soft, nontender, nondistended  Musculoskeletal: No bilateral ankle edema  Psychiatric: Appropriate affect, cooperative  Neurologic: Oriented x 3,intermittent confusion, patient still occ hears music,  strength symmetric in all extremities, speech clear  Skin: No rashes      Results Reviewed:  LAB RESULTS:      Lab 25  0519 25  0412 25  1052 25  0845 25  0721   WBC 5.39 4.57  --   --  4.85   HEMOGLOBIN 12.8 12.0  --   --  13.5   HEMATOCRIT 39.0 36.2  --   --  40.0   PLATELETS 141 122*  --   --  147   NEUTROS ABS  --   --   --   --  2.53   IMMATURE GRANS (ABS)  --   --   --   --  0.01   LYMPHS ABS  --   --   --   --  1.77   MONOS ABS  --   --   --   --  0.47   EOS ABS  --   --   --   --  0.04   MCV 95.4 93.8  --   --  92.8   CRP  --   --   --   --  <0.30   PROCALCITONIN  --   --   --   --  0.04   LACTATE  --   --   --   --  0.9   LDH  --   --   --  198  --    PROTIME  --   --  12.6  --   --    HSTROP T  --   --   --  20* 20*         Lab 25  0519 25  0412 25  0721   SODIUM 140 140 141   POTASSIUM 4.0 4.0 3.9   CHLORIDE  107 106 104   CO2 26.0 24.0 25.0   ANION GAP 7.0 10.0 12.0   BUN 17 12 15   CREATININE 1.13* 0.89 1.01*   EGFR 46.9* 62.4 53.7*   GLUCOSE 113* 101* 130*   CALCIUM 8.7 8.6 9.4   MAGNESIUM 2.2 2.2 2.2   PHOSPHORUS  --   --  3.3   HEMOGLOBIN A1C  --   --  6.00*   TSH  --   --  2.450         Lab 02/12/25  0721   TOTAL PROTEIN 6.8   ALBUMIN 4.3   GLOBULIN 2.5   ALT (SGPT) 16   AST (SGOT) 19   BILIRUBIN 0.4   ALK PHOS 62   LIPASE 285*         Lab 02/12/25  1052 02/12/25  0845 02/12/25  0721   PROBNP  --   --  359.2   HSTROP T  --  20* 20*   PROTIME 12.6  --   --    INR 0.93  --   --          Lab 02/12/25  0721   CHOLESTEROL 147   LDL CHOL 65   HDL CHOL 61*   TRIGLYCERIDES 117             Lab 02/12/25  1024   PH, ARTERIAL 7.427   PCO2, ARTERIAL 40.9   PO2 ART 75.6*   FIO2 21   HCO3 ART 26.9*   BASE EXCESS ART 2.3*   CARBOXYHEMOGLOBIN 1.3     Brief Urine Lab Results  (Last result in the past 365 days)        Color   Clarity   Blood   Leuk Est   Nitrite   Protein   CREAT   Urine HCG        02/12/25 0734 Yellow   Clear   Negative   Negative   Negative   Negative                   Microbiology Results Abnormal       None            No radiology results from the last 24 hrs    Results for orders placed during the hospital encounter of 10/25/24    Adult Transthoracic Echo Complete W/ Cont if Necessary Per Protocol    Interpretation Summary    Left ventricular ejection fraction appears to be 56 - 60%.    Estimated right ventricular systolic pressure from tricuspid regurgitation is normal (<35 mmHg).    No significant valvular disease      Current medications:  Scheduled Meds:aspirin, 81 mg, Oral, Daily  donepezil, 10 mg, Oral, Daily  enoxaparin, 40 mg, Subcutaneous, Daily  losartan, 25 mg, Oral, Q24H  melatonin, 10 mg, Oral, Nightly  pregabalin, 50 mg, Oral, TID  QUEtiapine, 25 mg, Oral, Nightly  rosuvastatin, 10 mg, Oral, Daily  simethicone, 80 mg, Oral, BID  sodium chloride, 10 mL, Intravenous, Q12H      Continuous Infusions:    PRN Meds:.  acetaminophen **OR** acetaminophen **OR** acetaminophen    senna-docusate sodium **AND** polyethylene glycol **AND** bisacodyl **AND** bisacodyl    Calcium Replacement - Follow Nurse / BPA Driven Protocol    fluticasone    Magnesium Standard Dose Replacement - Follow Nurse / BPA Driven Protocol    meclizine    nitroglycerin    ondansetron ODT    Phosphorus Replacement - Follow Nurse / BPA Driven Protocol    Potassium Replacement - Follow Nurse / BPA Driven Protocol    sodium chloride    sodium chloride    Assessment & Plan   Assessment & Plan     Active Hospital Problems    Diagnosis  POA    Severe protein-calorie malnutrition [E43]  Yes    Abdominal pain [R10.9]  Yes    Elevated lipase [R74.8]  Unknown    Dementia without behavioral disturbance [F03.90]  Unknown    Social problem [Z60.9]  Not Applicable    Delirium [R41.0]  Unknown    Hallucination [R44.3]  Unknown    Protein calorie malnutrition [E46]  Unknown    GERD without esophagitis [K21.9]  Yes    Essential hypertension [I10]  Yes    Dyslipidemia, goal LDL below 70 [E78.5]  Yes    Coronary artery disease involving native coronary artery of native heart without angina pectoris [I25.10]  Yes      Resolved Hospital Problems   No resolved problems to display.        Brief Hospital Course to date:  Monique Betts is a 89 y.o. female  with a PMH significant for CAD s/p PCI, chronic back pain, chronic urinary incontinence s/p bladder stimulator, dementia with cognitive decline as well as left hip and left shoulder osteoarthritis.  She presents to [facility] ED with her daughter due to significant generalized weakness in her legs and upper extremities, endorsing mild suprapubic and LLQ abdominal pain (does not recall onset of pain), with mild nausea without vomiting and subjective chills.  Also endorsing lack of appetite with significant decline in fluid intake, states has been many days since she had a meal.  She also endorses  hallucinations.     This patient's problems and plans were partially entered by my partner and updated as appropriate by me 02/14/25.   All problems are new to me today     Nausea, diarrhea, abdominal pain -> resolved  -Possible viral illness PTA, now resolved, patient eating  -CT imaging negative      Dementia  Visual/Auditory Hallucinations   -Patient started on Seroquel last admit in Oct/Nov '24, this was continued after her DC from [facility]  -Patient states she hasn't been taking it the past few days which is likely the cause of her hallucinations vs progression of her dementia   -UA negative, CT head negative  -Delirium precautions. My partner discussed with family that this could worsen while in hospital      CAD s/p PCI  -Continue home statin and aspirin     HTN  -Continue Losartan      Urinary Incontinence  -S/P bladder stimulator per Dr Smith 8/2023   -Has paperwork and remote with her  -Has had issues with retention since admission      H/o chronic back pain, L5/S1 radiculopathy  Left hip and left shoulder arthritis with pain  -Follows with . Patient tells me she is supposed to have an Epidural injection soon, discussed with her to reschedule her appt  -PT/OT eval     Dispo: Patient currently lives alone. With her advancing dementia, family interested in at least retirement with possible transition later to LTC. CM following patient does have a POA.     Expected Discharge Location and Transportation: rehab   Expected Discharge   Expected Discharge Date: 2/17/2025; Expected Discharge Time:      VTE Prophylaxis:  Pharmacologic & mechanical VTE prophylaxis orders are present.         AM-PAC 6 Clicks Score (PT): 19 (02/13/25 2000)    CODE STATUS:   Code Status and Medical Interventions: No CPR (Do Not Attempt to Resuscitate); Limited Support; No intubation (DNI)   Ordered at: 02/12/25 1005     Medical Intervention Limits:    No intubation (DNI)     Level Of Support Discussed With:    Health Care Surrogate     Patient     Code Status (Patient has no pulse and is not breathing):    No CPR (Do Not Attempt to Resuscitate)     Medical Interventions (Patient has pulse or is breathing):    Limited Support       Deepa Moreno, APRN  02/14/25

## 2025-02-14 NOTE — CASE MANAGEMENT/SOCIAL WORK
Continued Stay Note  [facility]     Patient Name: Monique Betts  MRN: [id number]  Today's Date: 2/14/2025    Admit Date: 2/12/2025    Plan: Home   Discharge Plan       Row Name 02/14/25 0751       Plan    Plan Home    Plan Comments I met with patient and her son, present at bedside. I discussed recommendations of having 24 hour care. She has been using [facility] sitter agency who provide some in home care. Patient had been at [facility], independent living and did enjoy. She is agreeable for trying out their assisted living. Her son plans to be contacting them.   Patient has long term care insurance which may/may not cover some of her care there.  Patient tells me she worked with PT and did stairs.    Final Discharge Disposition Code 01 - home or self-care                   Discharge Codes    No documentation.                 Expected Discharge Date and Time       Expected Discharge Date Expected Discharge Time    Feb 17, 2025               Jackelin Barbour RN

## 2025-02-15 PROCEDURE — 25010000002 ENOXAPARIN PER 10 MG

## 2025-02-15 PROCEDURE — 99232 SBSQ HOSP IP/OBS MODERATE 35: CPT | Performed by: INTERNAL MEDICINE

## 2025-02-15 RX ORDER — POLYETHYLENE GLYCOL 3350 17 G/17G
17 POWDER, FOR SOLUTION ORAL 2 TIMES DAILY
Status: DISCONTINUED | OUTPATIENT
Start: 2025-02-15 | End: 2025-02-17 | Stop reason: HOSPADM

## 2025-02-15 RX ADMIN — ENOXAPARIN SODIUM 40 MG: 100 INJECTION SUBCUTANEOUS at 08:40

## 2025-02-15 RX ADMIN — SIMETHICONE 80 MG: 80 TABLET, CHEWABLE ORAL at 08:41

## 2025-02-15 RX ADMIN — Medication 10 ML: at 21:42

## 2025-02-15 RX ADMIN — ACETAMINOPHEN ORAL SOLUTION 650 MG: 650 SOLUTION ORAL at 00:17

## 2025-02-15 RX ADMIN — DONEPEZIL HYDROCHLORIDE 10 MG: 10 TABLET, FILM COATED ORAL at 08:40

## 2025-02-15 RX ADMIN — PREGABALIN 50 MG: 50 CAPSULE ORAL at 08:40

## 2025-02-15 RX ADMIN — SENNOSIDES AND DOCUSATE SODIUM 2 TABLET: 50; 8.6 TABLET ORAL at 09:34

## 2025-02-15 RX ADMIN — Medication 10 MG: at 21:41

## 2025-02-15 RX ADMIN — PREGABALIN 50 MG: 50 CAPSULE ORAL at 15:01

## 2025-02-15 RX ADMIN — ASPIRIN 81 MG 81 MG: 81 TABLET ORAL at 08:40

## 2025-02-15 RX ADMIN — BISACODYL 10 MG: 10 SUPPOSITORY RECTAL at 19:44

## 2025-02-15 RX ADMIN — PREGABALIN 50 MG: 50 CAPSULE ORAL at 21:41

## 2025-02-15 RX ADMIN — QUETIAPINE FUMARATE 25 MG: 25 TABLET ORAL at 21:41

## 2025-02-15 RX ADMIN — ROSUVASTATIN 10 MG: 10 TABLET, FILM COATED ORAL at 08:40

## 2025-02-15 RX ADMIN — Medication 10 ML: at 08:45

## 2025-02-15 RX ADMIN — POLYETHYLENE GLYCOL 3350 17 G: 17 POWDER, FOR SOLUTION ORAL at 15:01

## 2025-02-15 RX ADMIN — ACETAMINOPHEN ORAL SOLUTION 650 MG: 650 SOLUTION ORAL at 09:34

## 2025-02-15 RX ADMIN — SIMETHICONE 80 MG: 80 TABLET, CHEWABLE ORAL at 21:41

## 2025-02-15 NOTE — PLAN OF CARE
Goal Outcome Evaluation:              Outcome Evaluation: Patient is A&O able to voice wants and needs to staff, has denied pain at this time. Staff has assisted with ambulating in the hallway with the use of walker and gaitbelt. Gait noted to be steady. Currently sitting up in the chair at this time, with alarms on and in place with call light within reach.

## 2025-02-16 LAB
ANION GAP SERPL CALCULATED.3IONS-SCNC: 8 MMOL/L (ref 5–15)
BUN SERPL-MCNC: 16 MG/DL (ref 8–23)
BUN/CREAT SERPL: 13.8 (ref 7–25)
CALCIUM SPEC-SCNC: 9 MG/DL (ref 8.6–10.5)
CHLORIDE SERPL-SCNC: 105 MMOL/L (ref 98–107)
CO2 SERPL-SCNC: 28 MMOL/L (ref 22–29)
CREAT SERPL-MCNC: 1.16 MG/DL (ref 0.57–1)
EGFRCR SERPLBLD CKD-EPI 2021: 45.4 ML/MIN/1.73
GLUCOSE SERPL-MCNC: 147 MG/DL (ref 65–99)
POTASSIUM SERPL-SCNC: 4.4 MMOL/L (ref 3.5–5.2)
SODIUM SERPL-SCNC: 141 MMOL/L (ref 136–145)

## 2025-02-16 PROCEDURE — 25010000002 ENOXAPARIN PER 10 MG

## 2025-02-16 PROCEDURE — 80048 BASIC METABOLIC PNL TOTAL CA: CPT | Performed by: NURSE PRACTITIONER

## 2025-02-16 PROCEDURE — 99232 SBSQ HOSP IP/OBS MODERATE 35: CPT | Performed by: NURSE PRACTITIONER

## 2025-02-16 RX ORDER — FLUTICASONE PROPIONATE 50 MCG
2 SPRAY, SUSPENSION (ML) NASAL DAILY
Status: DISCONTINUED | OUTPATIENT
Start: 2025-02-16 | End: 2025-02-17 | Stop reason: HOSPADM

## 2025-02-16 RX ORDER — CETIRIZINE HYDROCHLORIDE 10 MG/1
5 TABLET ORAL DAILY
Status: DISCONTINUED | OUTPATIENT
Start: 2025-02-16 | End: 2025-02-17 | Stop reason: HOSPADM

## 2025-02-16 RX ADMIN — PHENOL 1 SPRAY: 1.5 LIQUID ORAL at 15:26

## 2025-02-16 RX ADMIN — LOSARTAN POTASSIUM 25 MG: 50 TABLET, FILM COATED ORAL at 08:54

## 2025-02-16 RX ADMIN — ASPIRIN 81 MG 81 MG: 81 TABLET ORAL at 08:55

## 2025-02-16 RX ADMIN — CETIRIZINE HYDROCHLORIDE 5 MG: 10 TABLET, FILM COATED ORAL at 08:54

## 2025-02-16 RX ADMIN — ROSUVASTATIN 10 MG: 10 TABLET, FILM COATED ORAL at 08:54

## 2025-02-16 RX ADMIN — PREGABALIN 50 MG: 50 CAPSULE ORAL at 08:55

## 2025-02-16 RX ADMIN — Medication 10 ML: at 09:00

## 2025-02-16 RX ADMIN — ACETAMINOPHEN ORAL SOLUTION 650 MG: 650 SOLUTION ORAL at 01:12

## 2025-02-16 RX ADMIN — PHENOL 1 SPRAY: 1.5 LIQUID ORAL at 12:14

## 2025-02-16 RX ADMIN — PREGABALIN 50 MG: 50 CAPSULE ORAL at 20:53

## 2025-02-16 RX ADMIN — QUETIAPINE FUMARATE 25 MG: 25 TABLET ORAL at 20:53

## 2025-02-16 RX ADMIN — POLYETHYLENE GLYCOL 3350 17 G: 17 POWDER, FOR SOLUTION ORAL at 08:52

## 2025-02-16 RX ADMIN — DONEPEZIL HYDROCHLORIDE 10 MG: 10 TABLET, FILM COATED ORAL at 08:54

## 2025-02-16 RX ADMIN — POLYETHYLENE GLYCOL 3350 17 G: 17 POWDER, FOR SOLUTION ORAL at 20:53

## 2025-02-16 RX ADMIN — SIMETHICONE 80 MG: 80 TABLET, CHEWABLE ORAL at 20:53

## 2025-02-16 RX ADMIN — PREGABALIN 50 MG: 50 CAPSULE ORAL at 16:47

## 2025-02-16 RX ADMIN — Medication 10 MG: at 20:53

## 2025-02-16 RX ADMIN — SIMETHICONE 80 MG: 80 TABLET, CHEWABLE ORAL at 08:54

## 2025-02-16 RX ADMIN — FLUTICASONE PROPIONATE 2 SPRAY: 50 SPRAY, METERED NASAL at 08:55

## 2025-02-16 RX ADMIN — Medication 10 ML: at 20:54

## 2025-02-16 RX ADMIN — SENNOSIDES AND DOCUSATE SODIUM 2 TABLET: 50; 8.6 TABLET ORAL at 08:52

## 2025-02-16 RX ADMIN — ENOXAPARIN SODIUM 40 MG: 100 INJECTION SUBCUTANEOUS at 08:53

## 2025-02-16 NOTE — PROGRESS NOTES
[facility]  PROGRESS NOTE    Patient Name: Monique Betts  : 1935  MRN: [id number]    Date of Admission: 2025  Primary Care Physician: Sena Lomeli MD    Subjective   Subjective     CC:  Mental status change     HPI:  Patient is resting in bed in NAD> tolerating diet. Had bm on 2/15. States throat sore this am having some nasal drainage       Objective   Objective     Vital Signs:   Temp:  [97.9 °F (36.6 °C)-98.3 °F (36.8 °C)] 98 °F (36.7 °C)  Heart Rate:  [46-69] 55  Resp:  [14-18] 16  BP: (113-162)/(39-84) 162/62     Physical Exam:  Constitutional: No acute distress, awake, alert  HENT: NCAT, mucous membranes moist  Respiratory: Clear to auscultation bilaterally, respiratory effort normal room air 94%  Cardiovascular: RRR, no murmurs, rubs, or gallops  Gastrointestinal: Positive bowel sounds, soft, nontender, nondistended  Musculoskeletal: No bilateral ankle edema  Psychiatric: Appropriate affect, cooperative  Neurologic: Oriented x 2-3, strength symmetric in all extremities, , speech clear  Skin: No rashes      Results Reviewed:  LAB RESULTS:      Lab 25  0519 25  0412 25  1052 25  0845 25  0721   WBC 5.39 4.57  --   --  4.85   HEMOGLOBIN 12.8 12.0  --   --  13.5   HEMATOCRIT 39.0 36.2  --   --  40.0   PLATELETS 141 122*  --   --  147   NEUTROS ABS  --   --   --   --  2.53   IMMATURE GRANS (ABS)  --   --   --   --  0.01   LYMPHS ABS  --   --   --   --  1.77   MONOS ABS  --   --   --   --  0.47   EOS ABS  --   --   --   --  0.04   MCV 95.4 93.8  --   --  92.8   CRP  --   --   --   --  <0.30   PROCALCITONIN  --   --   --   --  0.04   LACTATE  --   --   --   --  0.9   LDH  --   --   --  198  --    PROTIME  --   --  12.6  --   --    HSTROP T  --   --   --  20* 20*         Lab 25  0519 25  0412 25  0721   SODIUM 140 140 141   POTASSIUM 4.0 4.0 3.9   CHLORIDE 107 106 104   CO2 26.0 24.0 25.0   ANION GAP 7.0  10.0 12.0   BUN 17 12 15   CREATININE 1.13* 0.89 1.01*   EGFR 46.9* 62.4 53.7*   GLUCOSE 113* 101* 130*   CALCIUM 8.7 8.6 9.4   MAGNESIUM 2.2 2.2 2.2   PHOSPHORUS  --   --  3.3   HEMOGLOBIN A1C  --   --  6.00*   TSH  --   --  2.450         Lab 02/12/25  0721   TOTAL PROTEIN 6.8   ALBUMIN 4.3   GLOBULIN 2.5   ALT (SGPT) 16   AST (SGOT) 19   BILIRUBIN 0.4   ALK PHOS 62   LIPASE 285*         Lab 02/12/25  1052 02/12/25  0845 02/12/25  0721   PROBNP  --   --  359.2   HSTROP T  --  20* 20*   PROTIME 12.6  --   --    INR 0.93  --   --          Lab 02/12/25  0721   CHOLESTEROL 147   LDL CHOL 65   HDL CHOL 61*   TRIGLYCERIDES 117             Lab 02/12/25  1024   PH, ARTERIAL 7.427   PCO2, ARTERIAL 40.9   PO2 ART 75.6*   FIO2 21   HCO3 ART 26.9*   BASE EXCESS ART 2.3*   CARBOXYHEMOGLOBIN 1.3     Brief Urine Lab Results  (Last result in the past 365 days)        Color   Clarity   Blood   Leuk Est   Nitrite   Protein   CREAT   Urine HCG        02/12/25 0734 Yellow   Clear   Negative   Negative   Negative   Negative                   Microbiology Results Abnormal       None            No radiology results from the last 24 hrs    Results for orders placed during the hospital encounter of 10/25/24    Adult Transthoracic Echo Complete W/ Cont if Necessary Per Protocol    Interpretation Summary    Left ventricular ejection fraction appears to be 56 - 60%.    Estimated right ventricular systolic pressure from tricuspid regurgitation is normal (<35 mmHg).    No significant valvular disease      Current medications:  Scheduled Meds:aspirin, 81 mg, Oral, Daily  cetirizine, 5 mg, Oral, Daily  donepezil, 10 mg, Oral, Daily  enoxaparin, 40 mg, Subcutaneous, Daily  fluticasone, 2 spray, Each Nare, Daily  losartan, 25 mg, Oral, Q24H  melatonin, 10 mg, Oral, Nightly  polyethylene glycol, 17 g, Oral, BID  pregabalin, 50 mg, Oral, TID  QUEtiapine, 25 mg, Oral, Nightly  rosuvastatin, 10 mg, Oral, Daily  simethicone, 80 mg, Oral, BID  sodium  chloride, 10 mL, Intravenous, Q12H      Continuous Infusions:   PRN Meds:.  acetaminophen **OR** acetaminophen **OR** acetaminophen    senna-docusate sodium **AND** polyethylene glycol **AND** bisacodyl **AND** bisacodyl    Calcium Replacement - Follow Nurse / BPA Driven Protocol    Magnesium Standard Dose Replacement - Follow Nurse / BPA Driven Protocol    meclizine    nitroglycerin    ondansetron ODT    phenol    Phosphorus Replacement - Follow Nurse / BPA Driven Protocol    Potassium Replacement - Follow Nurse / BPA Driven Protocol    sodium chloride    sodium chloride    Assessment & Plan   Assessment & Plan     Active Hospital Problems    Diagnosis  POA    Severe protein-calorie malnutrition [E43]  Yes    Abdominal pain [R10.9]  Yes    Elevated lipase [R74.8]  Unknown    Dementia without behavioral disturbance [F03.90]  Unknown    Social problem [Z60.9]  Not Applicable    Delirium [R41.0]  Unknown    Hallucination [R44.3]  Unknown    Protein calorie malnutrition [E46]  Unknown    GERD without esophagitis [K21.9]  Yes    Essential hypertension [I10]  Yes    Dyslipidemia, goal LDL below 70 [E78.5]  Yes    Coronary artery disease involving native coronary artery of native heart without angina pectoris [I25.10]  Yes      Resolved Hospital Problems   No resolved problems to display.        Brief Hospital Course to date:  Monique Btets is a 89 y.o. female with a PMH significant for CAD s/p PCI, chronic back pain, chronic urinary incontinence s/p bladder stimulator, dementia with cognitive decline as well as left hip and left shoulder osteoarthritis.  Admitted with significant generalized weakness in her legs and upper extremities, endorsing mild suprapubic and LLQ abdominal pain (does not recall onset of pain), with mild nausea without vomiting and subjective chills.  Noted to have lack of appetite with significant decline in fluid intake, + hallucinations..     This patient's problems and plans were partially  entered by my partner and updated as appropriate by me 02/16/25.      Nausea, diarrhea, abdominal pain -> resolved  -Possible viral illness PTA, now resolved, patient eating  -CT imaging negative      Dementia  Visual/Auditory Hallucinations   -Patient started on Seroquel last admit in Oct/Nov '24, this was continued after her DC from [facility]  -Patient states she hasn't been taking it the past few days -? likely the cause of her hallucinations vs progression of her dementia   -UA negative, CT head negative  -Delirium precautions     CAD s/p PCI  -Continue home statin and aspirin     Constipation  -add scheduled miralax     HTN  -Continue Losartan      Urinary Incontinence  -S/P bladder stimulator per Dr Smith 8/2023   -Has paperwork and remote with her  -Has had issues with retention since admission      H/o chronic back pain, L5/S1 radiculopathy  Left hip and left shoulder arthritis with pain  -Follows with [facility], supposed to have an Epidural injection soon, will need to reschedule her appt  -PT/OT recommending SNF  -plan for assisted v transition to LTC, needs 24/7 care     My partner Discussed with patient's son over the phone regarding medical readiness.  He states he is not able to come and get her but he has made arrangements for her to be moved to [facility] on Monday.  He is unwilling to come pick her up over the weekend because he says it is going to rain 6 inches and he has a farm in a family and does not want to move furniture in the rain.    Expected Discharge Location and Transportation: home   Expected Discharge   Expected Discharge Date: 2/17/2025; Expected Discharge Time:      VTE Prophylaxis:  Pharmacologic & mechanical VTE prophylaxis orders are present.         AM-PAC 6 Clicks Score (PT): 16 (02/15/25 2000)    CODE STATUS:   Code Status and Medical Interventions: No CPR (Do Not Attempt to Resuscitate); Limited Support; No intubation (DNI)   Ordered at: 02/12/25 1005     Medical Intervention  Limits:    No intubation (DNI)     Level Of Support Discussed With:    Health Care Surrogate    Patient     Code Status (Patient has no pulse and is not breathing):    No CPR (Do Not Attempt to Resuscitate)     Medical Interventions (Patient has pulse or is breathing):    Limited Support       Deepa Moreno, APRN  02/16/25

## 2025-02-16 NOTE — PLAN OF CARE
Goal Outcome Evaluation:              Outcome Evaluation: Patient is A&O x4 able to voice wants and needs to staff has complained of sore throat MD was notified new orders for Flonase and Chlorasptic spray which has been effective. Patient has ambulated up and down the hallway with staff, walker and gaitbelt without any problems today. Has alarms on and in place with call light within reach.

## 2025-02-17 ENCOUNTER — READMISSION MANAGEMENT (OUTPATIENT)
Dept: CALL CENTER | Facility: HOSPITAL | Age: 89
End: 2025-02-17
Payer: MEDICARE

## 2025-02-17 VITALS
DIASTOLIC BLOOD PRESSURE: 54 MMHG | HEART RATE: 67 BPM | HEIGHT: 64 IN | OXYGEN SATURATION: 93 % | SYSTOLIC BLOOD PRESSURE: 144 MMHG | BODY MASS INDEX: 24.59 KG/M2 | WEIGHT: 144 LBS | TEMPERATURE: 96.9 F | RESPIRATION RATE: 18 BRPM

## 2025-02-17 LAB
BACTERIA SPEC AEROBE CULT: NORMAL
BACTERIA SPEC AEROBE CULT: NORMAL

## 2025-02-17 PROCEDURE — 25010000002 ENOXAPARIN PER 10 MG

## 2025-02-17 PROCEDURE — 99239 HOSP IP/OBS DSCHRG MGMT >30: CPT | Performed by: NURSE PRACTITIONER

## 2025-02-17 RX ORDER — POLYETHYLENE GLYCOL 3350 17 G/17G
17 POWDER, FOR SOLUTION ORAL 2 TIMES DAILY
Qty: 3 PACKET | Refills: 0 | Status: SHIPPED | OUTPATIENT
Start: 2025-02-17 | End: 2025-02-19

## 2025-02-17 RX ORDER — PREGABALIN 50 MG/1
50 CAPSULE ORAL 3 TIMES DAILY
Qty: 9 CAPSULE | Refills: 0 | Status: SHIPPED | OUTPATIENT
Start: 2025-02-17 | End: 2025-02-20

## 2025-02-17 RX ORDER — AMOXICILLIN 250 MG
2 CAPSULE ORAL 2 TIMES DAILY PRN
Start: 2025-02-17

## 2025-02-17 RX ORDER — BISACODYL 10 MG
10 SUPPOSITORY, RECTAL RECTAL DAILY PRN
Start: 2025-02-17

## 2025-02-17 RX ORDER — CETIRIZINE HYDROCHLORIDE 5 MG/1
5 TABLET ORAL DAILY
Start: 2025-02-18

## 2025-02-17 RX ADMIN — Medication 10 ML: at 09:05

## 2025-02-17 RX ADMIN — SIMETHICONE 80 MG: 80 TABLET, CHEWABLE ORAL at 09:04

## 2025-02-17 RX ADMIN — ASPIRIN 81 MG 81 MG: 81 TABLET ORAL at 08:53

## 2025-02-17 RX ADMIN — FLUTICASONE PROPIONATE 2 SPRAY: 50 SPRAY, METERED NASAL at 09:01

## 2025-02-17 RX ADMIN — CETIRIZINE HYDROCHLORIDE 5 MG: 10 TABLET, FILM COATED ORAL at 08:53

## 2025-02-17 RX ADMIN — ROSUVASTATIN 10 MG: 10 TABLET, FILM COATED ORAL at 09:04

## 2025-02-17 RX ADMIN — BISACODYL 10 MG: 10 SUPPOSITORY RECTAL at 06:30

## 2025-02-17 RX ADMIN — DONEPEZIL HYDROCHLORIDE 10 MG: 10 TABLET, FILM COATED ORAL at 09:00

## 2025-02-17 RX ADMIN — PREGABALIN 50 MG: 50 CAPSULE ORAL at 09:05

## 2025-02-17 RX ADMIN — ENOXAPARIN SODIUM 40 MG: 100 INJECTION SUBCUTANEOUS at 09:00

## 2025-02-17 NOTE — CASE MANAGEMENT/SOCIAL WORK
Case Management Discharge Note      Final Note: To [facility] today.   Nursing to call report to [phone]. I faxed summary. IMM issued today. Family to transport. [facility] with [facility] will have therapist provide service in this setting.         Selected Continued Care - Admitted Since 2/12/2025       Destination    No services have been selected for the patient.                Durable Medical Equipment    No services have been selected for the patient.                Dialysis/Infusion    No services have been selected for the patient.                Home Medical Care Coordination complete.      Service Provider Services Address Phone Fax Patient Preferred    [facility] [address] [place] [address] [address] [address] [phone] -- --              Therapy    No services have been selected for the patient.                Community Resources    No services have been selected for the patient.                Community & DME    No services have been selected for the patient.                         Final Discharge Disposition Code: 06 - home with home health care

## 2025-02-17 NOTE — DISCHARGE PLACEMENT REQUEST
"Monique Manning (89 y.o. Female)       Date of Birth   1935    Social Security Number       Address   [address] [place] [address]  [facility] [address]    Home Phone   [phone]    MRN   [id number]       Religious   Pentecostal    Marital Status                               Admission Date   2/12/25    Admission Type   Emergency    Admitting Provider   BESSIE Chavez DO    Attending Provider   BESSIE Chavez DO    Department, Room/Bed   [facility] 3F, S316/1       Discharge Date       Discharge Disposition   Home or Self Care    Discharge Destination                                 Attending Provider: BESSIE Chavez DO    Allergies: Pb-hyoscy-atropine-scopolamine, Lisinopril    Isolation: None   Infection: None   Code Status: No CPR    Ht: 162.6 cm (64\")   Wt: 65.3 kg (144 lb)    Admission Cmt: None   Principal Problem: None                  Active Insurance as of 2/12/2025       Primary Coverage       Payor Plan Insurance Group Employer/Plan Group    MEDICARE MEDICARE A & B        Payor Plan Address Payor Plan Phone Number Payor Plan Fax Number Effective Dates    [address] [phone]  4/1/2001 - None Entered    [facility] [id number]         Subscriber Name Subscriber Birth Date Member ID       MONIQUE MANNING 1935 [id number]               Secondary Coverage       Payor Plan Insurance Group Employer/Plan Group    [facility] SUPP [id number]       Payor Plan Address Payor Plan Phone Number Payor Plan Fax Number Effective Dates    [address]   7/1/2003 - None Entered    [facility] [id number]         Subscriber Name Subscriber Birth Date Member ID       MONIQUE MANNING 1935 [id number]                     Emergency Contacts        (Rel.) Home Phone Work Phone Mobile Phone    Vivek Manning Jr (Son) [phone] -- --    MICHAEL ARGUETA (Grandchild) [phone] -- [phone]    Brandan Manning (Son) [phone] -- [phone]    Ann Marie Manning " (Relative) [phone] -- [phone]              Insurance Information                  MEDICARE/MEDICARE A & B Phone: [phone]    Subscriber: Monique Betts Subscriber#: [id number]    Group#: -- Precert#: --    Authorization#: -- Effective Date: --        [employer]HE[employer] [facility] SUPP Phone: --    Subscriber: Monique Betts Subscriber#: [id number]    Group#: [id number] Precert#: --    Authorization#: -- Effective Date: --               Discharge Summary        Deepa Moreno APRN at 25 Count includes the [facility]6              [facility] Medicine Services  DISCHARGE SUMMARY    Patient Name: Monique Betts  : 1935  MRN: [id number]    Date of Admission: 2025  6:35 AM  Date of Discharge:  25  Primary Care Physician: Sena Lomeli MD    Consults       No orders found from 2025 to 2025.            Hospital Course     Presenting Problem: mental status changes     Active Hospital Problems    Diagnosis  POA    Severe protein-calorie malnutrition [E43]  Yes    Abdominal pain [R10.9]  Yes    Elevated lipase [R74.8]  Unknown    Dementia without behavioral disturbance [F03.90]  Unknown    Social problem [Z60.9]  Not Applicable    Delirium [R41.0]  Unknown    Hallucination [R44.3]  Unknown    Protein calorie malnutrition [E46]  Unknown    GERD without esophagitis [K21.9]  Yes    Essential hypertension [I10]  Yes    Dyslipidemia, goal LDL below 70 [E78.5]  Yes    Coronary artery disease involving native coronary artery of native heart without angina pectoris [I25.10]  Yes      Resolved Hospital Problems   No resolved problems to display.          Hospital Course:  Monique Betts is a 89 y.o. female  with a PMH significant for CAD s/p PCI, chronic back pain, chronic urinary incontinence s/p bladder stimulator, dementia with cognitive decline as well as left hip and left shoulder osteoarthritis.  Admitted with significant generalized weakness in her legs and upper  extremities, endorsing mild suprapubic and LLQ abdominal pain (does not recall onset of pain), with mild nausea without vomiting and subjective chills.  Noted to have lack of appetite with significant decline in fluid intake, + hallucinations..      Nausea, diarrhea, abdominal pain -> resolved  -Possible viral illness PTA, now resolved, patient eating  -CT imaging negative      Dementia  Visual/Auditory Hallucinations   -Patient started on Seroquel last admit in Oct/Nov '24, this was continued after her DC from [facility]  -Patient states she hasn't been taking it the past few days -? likely the cause of her hallucinations vs progression of her dementia   -UA negative, CT head negative  -Delirium precautions     CAD s/p PCI  -Continue home statin and aspirin     Constipation  -add scheduled miralax     HTN  -Continue Losartan      Urinary Incontinence  -S/P bladder stimulator per Dr Smith 8/2023   -Has paperwork and remote with her  -Has had issues with retention since admission      H/o chronic back pain, L5/S1 radiculopathy  Left hip and left shoulder arthritis with pain  -Follows with , supposed to have an Epidural injection soon, will need to reschedule her appt  -PT/OT recommending SNF  -[facility] v [facility] to LTC, needs 24/7 care     My partner Discussed with patient's son over the phone regarding medical readiness.  He states he is not able to come and get her but he has made arrangements for her to be moved to [facility] on Monday.    Patient has remained clinically stable and will be discharged today.    Discharge Follow Up Recommendations for outpatient labs/diagnostics:   Follow up with pcp one week     Day of Discharge     HPI:   Patient is sitting up in bed in NAD. She states she feels good today. Throat soreness improved. Had good bm last night     Review of Systems  Gen- No fevers, chills  CV- No chest pain, palpitations  Resp- No cough, dyspnea  GI- No N/V/D, abd pain      Vital  Signs:   Temp:  [96.9 °F (36.1 °C)-98.6 °F (37 °C)] 96.9 °F (36.1 °C)  Heart Rate:  [51-70] 67  Resp:  [18] 18  BP: (105-153)/(50-71) 144/54      Physical Exam:  onstitutional: No acute distress, awake, alert  HENT: NCAT, mucous membranes moist  Respiratory: Clear to auscultation bilaterally, respiratory effort normal room air 94%  Cardiovascular: RRR, no murmurs, rubs, or gallops  Gastrointestinal: Positive bowel sounds, soft, nontender, nondistended  Musculoskeletal: No bilateral ankle edema  Psychiatric: Appropriate affect, cooperative  Neurologic: Oriented x 2-3, strength symmetric in all extremities, , speech clear  Skin: No rashes          Pertinent  and/or Most Recent Results     LAB RESULTS:      Lab 02/14/25  0519 02/13/25 0412 02/12/25  1052 02/12/25  0845 02/12/25  0721   WBC 5.39 4.57  --   --  4.85   HEMOGLOBIN 12.8 12.0  --   --  13.5   HEMATOCRIT 39.0 36.2  --   --  40.0   PLATELETS 141 122*  --   --  147   NEUTROS ABS  --   --   --   --  2.53   IMMATURE GRANS (ABS)  --   --   --   --  0.01   LYMPHS ABS  --   --   --   --  1.77   MONOS ABS  --   --   --   --  0.47   EOS ABS  --   --   --   --  0.04   MCV 95.4 93.8  --   --  92.8   CRP  --   --   --   --  <0.30   PROCALCITONIN  --   --   --   --  0.04   LACTATE  --   --   --   --  0.9   LDH  --   --   --  198  --    PROTIME  --   --  12.6  --   --          Lab 02/16/25  1345 02/14/25  0519 02/13/25  0412 02/12/25  0721   SODIUM 141 140 140 141   POTASSIUM 4.4 4.0 4.0 3.9   CHLORIDE 105 107 106 104   CO2 28.0 26.0 24.0 25.0   ANION GAP 8.0 7.0 10.0 12.0   BUN 16 17 12 15   CREATININE 1.16* 1.13* 0.89 1.01*   EGFR 45.4* 46.9* 62.4 53.7*   GLUCOSE 147* 113* 101* 130*   CALCIUM 9.0 8.7 8.6 9.4   MAGNESIUM  --  2.2 2.2 2.2   PHOSPHORUS  --   --   --  3.3   HEMOGLOBIN A1C  --   --   --  6.00*   TSH  --   --   --  2.450         Lab 02/12/25  0721   TOTAL PROTEIN 6.8   ALBUMIN 4.3   GLOBULIN 2.5   ALT (SGPT) 16   AST (SGOT) 19   BILIRUBIN 0.4   ALK PHOS 62    LIPASE 285*         Lab 02/12/25  1052 02/12/25  0845 02/12/25  0721   PROBNP  --   --  359.2   HSTROP T  --  20* 20*   PROTIME 12.6  --   --    INR 0.93  --   --          Lab 02/12/25  0721   CHOLESTEROL 147   LDL CHOL 65   HDL CHOL 61*   TRIGLYCERIDES 117             Lab 02/12/25  1024   PH, ARTERIAL 7.427   PCO2, ARTERIAL 40.9   PO2 ART 75.6*   FIO2 21   HCO3 ART 26.9*   BASE EXCESS ART 2.3*   CARBOXYHEMOGLOBIN 1.3     Brief Urine Lab Results  (Last result in the past 365 days)        Color   Clarity   Blood   Leuk Est   Nitrite   Protein   CREAT   Urine HCG        02/12/25 0734 Yellow   Clear   Negative   Negative   Negative   Negative                 Microbiology Results (last 10 days)       Procedure Component Value - Date/Time    Blood Culture - Blood, Arm, Left [[id number]]  (Normal) Collected: 02/12/25 0845    Lab Status: Final result Specimen: Blood from Arm, Left Updated: 02/17/25 0915     Blood Culture No growth at 5 days    Blood Culture - Blood, Hand, Right [[id number]]  (Normal) Collected: 02/12/25 0721    Lab Status: Final result Specimen: Blood from Hand, Right Updated: 02/17/25 0801     Blood Culture No growth at 5 days    Narrative:      Less than seven (7) mL's of blood was collected.  Insufficient quantity may yield false negative results.    COVID PRE-OP / PRE-PROCEDURE SCREENING ORDER (NO ISOLATION) - Swab, Nasopharynx [[id number]]  (Normal) Collected: 02/12/25 0706    Lab Status: Final result Specimen: Swab from Nasopharynx Updated: 02/12/25 0754    Narrative:      The following orders were created for panel order COVID PRE-OP / PRE-PROCEDURE SCREENING ORDER (NO ISOLATION) - Swab, Nasopharynx.  Procedure                               Abnormality         Status                     ---------                               -----------         ------                     COVID-19, FLU A/B, RSV P...[[id number]]  Normal              Final result                 Please view results for these tests on  the individual orders.    COVID-19, FLU A/B, RSV PCR 1 HR TAT - Swab, Nasopharynx [[id number]]  (Normal) Collected: 02/12/25 0706    Lab Status: Final result Specimen: Swab from Nasopharynx Updated: 02/12/25 0754     COVID19 Not Detected     Influenza A PCR Not Detected     Influenza B PCR Not Detected     RSV, PCR Not Detected    Narrative:      Fact sheet for providers: https://www.fda.gov/media/678345/download    Fact sheet for patients: https://www.fda.gov/media/452008/download    Test performed by PCR.            CT Abdomen Pelvis With Contrast    Result Date: 2/12/2025  CT ABDOMEN PELVIS W CONTRAST Date of Exam: 2/12/2025 7:59 AM EST Indication: Abdominal distention with epigastric tenderness, nausea and vomiting, altered mental status. Comparison: CT abdomen and pelvis 4/23/2023 Technique: Axial CT images were obtained of the abdomen and pelvis following the uneventful intravenous administration of 85 mL Isovue-300. Reconstructed coronal and sagittal images were also obtained. Automated exposure control and iterative construction methods were used. Findings: Lung bases appear grossly clear. Redemonstration of a couple scattered liver cysts, with otherwise unremarkable appearance of the liver. Unremarkable appearance of the gallbladder and bile ducts. Normal size and appearance of the spleen. Unremarkable appearance of the pancreas. Normal appearance of the adrenal glands. Unremarkable appearance of the kidneys, ureters, bladder, uterus, and adnexa. There is severe sigmoid diverticulosis without evidence of diverticulitis. Normal appendix. No bowel obstruction. There is a small sliding hiatal hernia. No inflammatory change of the GI tract. No abdominopelvic free fluid or fat stranding. No pneumoperitoneum. There is atherosclerosis of the abdominal aorta unremarkable appearance of the vasculature. No bulky or suspicious abdominopelvic lymph nodes. The body wall soft tissues are unremarkable. There is a  left-sided sacral nerve stimulator with right-sided pulse generator. No acute or suspicious bony findings.     Impression: No acute abdominopelvic findings. Sigmoid diverticulosis without diverticulitis. Unremarkable CT appearance of the pancreas. Electronically Signed: Luis Miguel Aguilar MD  2/12/2025 8:30 AM EST  Workstation ID: [id number]    CT Head Without Contrast    Result Date: 2/12/2025  CT HEAD WO CONTRAST Date of Exam: 2/12/2025 7:59 AM EST Indication: altered mental status, suspect metabolic. Comparison: October 25, 2024 Technique: Axial CT images were obtained of the head without contrast administration.  Automated exposure control and iterative construction methods were used. Findings: No acute intracranial hemorrhage or extra-axial collection is identified. The ventricles appear normal in caliber, with no evidence of mass effect or midline shift. The basal cisterns appear patent. The gray-white differentiation appears preserved. The calvarium appear intact. The paranasal sinuses are clear. The mastoid air cells are well-aerated. Subtle foci of periventricular and subcortical white matter hypodensities are nonspecific, but likely the sequela of mild chronic small vessel ischemic disease.     Impression: 1.No acute intracranial process identified. 2.Findings suggestive of mild chronic small vessel ischemic disease. Electronically Signed: Alvarez Cunha MD  2/12/2025 8:24 AM EST  Workstation ID: [id number]    XR Chest 1 View    Result Date: 2/12/2025  XR CHEST 1 VW Date of Exam: 2/12/2025 7:42 AM EST Indication: dyspnea, altered mental status Comparison: June 13, 2022 FINDINGS: No definite focal or diffuse pulmonary infiltrate is identified.  No pneumothorax or significant pleural effusion. The heart appears mildly enlarged. Calcific atherosclerosis of the aortic arch. Mediastinal contour appears grossly unchanged.     1.Mild cardiomegaly. 2.No radiographic findings of acute pulmonary abnormality.  Electronically Signed: Cabrera Zachery  2/12/2025 8:03 AM EST  Workstation ID: [id number]             Results for orders placed during the hospital encounter of 10/25/24    Adult Transthoracic Echo Complete W/ Cont if Necessary Per Protocol    Interpretation Summary    Left ventricular ejection fraction appears to be 56 - 60%.    Estimated right ventricular systolic pressure from tricuspid regurgitation is normal (<35 mmHg).    No significant valvular disease      Plan for Follow-up of Pending Labs/Results:     Discharge Details        Discharge Medications        New Medications        Instructions Start Date   bisacodyl 10 MG suppository  Commonly known as: DULCOLAX   10 mg, Rectal, Daily PRN      cetirizine 5 MG tablet  Commonly known as: zyrTEC   5 mg, Oral, Daily   Start Date: February 18, 2025     polyethylene glycol 17 g packet  Commonly known as: MIRALAX   17 g, Oral, 2 Times Daily      sennosides-docusate 8.6-50 MG per tablet  Commonly known as: PERICOLACE   2 tablets, Oral, 2 Times Daily PRN             Changes to Medications        Instructions Start Date   meclizine 25 MG tablet  Commonly known as: ANTIVERT  What changed:   how much to take  when to take this   25 mg, Oral, 3 Times Daily PRN             Continue These Medications        Instructions Start Date   alendronate 70 MG tablet  Commonly known as: FOSAMAX   70 mg, Every 7 Days      aspirin 81 MG chewable tablet   81 mg, Daily      donepezil 10 MG tablet  Commonly known as: ARICEPT   10 mg, Daily      Fiber 500 MG capsule   1 capsule, Daily      fluticasone 50 MCG/ACT nasal spray  Commonly known as: FLONASE   2 sprays, Daily      losartan 25 MG tablet  Commonly known as: COZAAR   25 mg, Oral, Daily      Magnesium 400 MG tablet   1 tablet, Daily      melatonin 5 MG tablet tablet   10 mg, Nightly      multivitamin with minerals tablet tablet   1 tablet, Daily      nitroglycerin 0.4 MG SL tablet  Commonly known as: Nitrostat   0.4 mg, Sublingual, Every 5  Minutes PRN, Take no more than 3 doses in 15 minutes.      pregabalin 50 MG capsule  Commonly known as: LYRICA   50 mg, Oral, 3 Times Daily      QUEtiapine 25 MG tablet  Commonly known as: SEROquel   25 mg, Oral, Nightly      rosuvastatin 10 MG tablet  Commonly known as: CRESTOR   10 mg, Daily      vitamin b complex capsule capsule   1 capsule, Daily             Stop These Medications      baclofen 10 MG tablet  Commonly known as: LIORESAL              Allergies   Allergen Reactions    Pb-Hyoscy-Atropine-Scopolamine Anaphylaxis    Lisinopril Hives         Discharge Disposition:  Home or Self Care    Diet:  Hospital:  Diet Order   Procedures    Diet: Regular/House; Fluid Consistency: Thin (IDDSI 0)       Diet Instructions       Diet: Regular/House Diet; Regular (IDDSI 7); Thin (IDDSI 0)      Discharge Diet: Regular/House Diet    Texture: Regular (IDDSI 7)    Fluid Consistency: Thin (IDDSI 0)             Activity:  Activity Instructions       Activity as Tolerated      Measure Blood Pressure      Measure Weight              Restrictions or Other Recommendations:         CODE STATUS:    Code Status and Medical Interventions: No CPR (Do Not Attempt to Resuscitate); Limited Support; No intubation (DNI)   Ordered at: 02/12/25 1005     Medical Intervention Limits:    No intubation (DNI)     Level Of Support Discussed With:    Health Care Surrogate    Patient     Code Status (Patient has no pulse and is not breathing):    No CPR (Do Not Attempt to Resuscitate)     Medical Interventions (Patient has pulse or is breathing):    Limited Support       Future Appointments   Date Time Provider Department Center   5/15/2025  2:15 PM Antonino Koo MD [facility]       Additional Instructions for the Follow-ups that You Need to Schedule       Ambulatory Referral to Home Health   As directed      Face to Face Visit Date: 2/14/2025   Follow-up provider for Plan of Care?: I treated the patient in an acute care facility and will  not continue treatment after discharge.   Follow-up provider: SENA LOMELI [[id number]]   Reason/Clinical Findings: abdominal pain   Describe mobility limitations that make leaving home difficult: Lives alone, family to stay with her   Nursing/Therapeutic Services Requested: Skilled Nursing Physical Therapy   Skilled nursing orders: Medication education   PT orders: Strengthening   Frequency: Other (2-3 times a week)        Discharge Follow-up with PCP   As directed       Currently Documented PCP:    Sena Lomeli MD    PCP Phone Number:    [phone]     Follow Up Details: follow up with pcp one week                      GRACY Boyd  02/17/25      Time Spent on Discharge:  I spent  45  minutes on this discharge activity which included: face-to-face encounter with the patient, reviewing the data in the system, coordination of the care with the nursing staff as well as consultants, documentation, and entering orders.            Electronically signed by Deepa Moreno APRN at 02/17/25 6482

## 2025-02-17 NOTE — CASE MANAGEMENT/SOCIAL WORK
Continued Stay Note  [facility]     Patient Name: Monique Betts  MRN: [id number]  Today's Date: 2/17/2025    Admit Date: 2/12/2025    Plan: Home   Discharge Plan       Row Name 02/17/25 0837       Plan    Plan Home    Plan Comments Patient plans [facility] directly there at discharge. Corinne with the [facility] is completing the work up while patient remains in house at [facility] [place].   She plans to do a face to face with patient today. [facility] can see her there for her home PT.     Final Discharge Disposition Code 06 - home with home health care                   Discharge Codes    No documentation.                 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 Will send referral to Dr Chavez for evaluation and possible Bx.

## 2025-02-17 NOTE — DISCHARGE PLACEMENT REQUEST
"Monique Betts (89 y.o. Female)        Thank you  Jackelin EGAN, RN, [facility]  [phone]   Date of Birth   1935    Social Security Number       Address   [address] [place] [address] [facility] [address]    Home Phone   [phone]    MRN   [id number]       Scientologist   [employer]    Marital Status                               Admission Date   2/12/25    Admission Type   Emergency    Admitting Provider   BESSIE Chavez DO    Attending Provider   BESSIE Chavez DO    Department, Room/Bed   Psychiatric 3F, S316/1       Discharge Date       Discharge Disposition       Discharge Destination                                 Attending Provider: BESSIE Chavez DO    Allergies: Pb-hyoscy-atropine-scopolamine, Lisinopril    Isolation: None   Infection: None   Code Status: No CPR    Ht: 162.6 cm (64\")   Wt: 65.3 kg (144 lb)    Admission Cmt: None   Principal Problem: None                  Active Insurance as of 2/12/2025       Primary Coverage       Payor Plan Insurance Group Employer/Plan Group    MEDICARE MEDICARE A & B        Payor Plan Address Payor Plan Phone Number Payor Plan Fax Number Effective Dates    [address] [phone]  4/1/2001 - None Entered    [facility] [address]         Subscriber Name Subscriber Birth Date Member ID       MONIQUE BETTS 1935 [id number]               Secondary Coverage       Payor Plan Insurance Group Employer/Plan Group    BHC [facility] SUPP [id number]       Payor Plan Address Payor Plan Phone Number Payor Plan Fax Number Effective Dates    [address]   7/1/2003 - None Entered    [facility] [address]         Subscriber Name Subscriber Birth Date Member ID       MONIQUE BETTS 1935 [id number]                     Emergency Contacts        (Rel.) Home Phone Work Phone Mobile Phone    Vivek Betts Jr (Son) [phone] -- --    MICHAEL ARGUETA (Grandchild) [phone] -- [phone]    AlpeshBrandan (Son) [phone] -- " [phone]    Ann Marie Betts (Relative) [phone] -- [phone]              Insurance Information                  MEDICARE/MEDICARE A & B Phone: [phone]    Subscriber: Monique Betts Subscriber#: [id number]    Group#: -- Precert#: --    Authorization#: -- Effective Date: --        [employer]HEM [employer]/ANTHEM [facility] SUPP Phone: --    Subscriber: Monique Betts Subscriber#: [id number]    Group#: [id number] Precert#: --    Authorization#: -- Effective Date: --           Marino Booker, OT   Occupational Therapist  Occupational Therapy     Plan of Care     Signed     Date of Service: 02/13/25 1302  Creation Time: 02/13/25 1350     Signed         Goal Outcome Evaluation:  Plan of Care Reviewed With: patient, son  Progress: no change  Outcome Evaluation: Safe and Independent ADL completion limited by strength, balance, and cognitive deficits warranting IP OT services to promote return to PLOF. No abdominal pain reported with ADL related mobility, increased assist for standing with LOB noted during MIP. Per son, plan is JAMIR for a period of time with potential return home and 24/7 care. OT endorses family plan, Pt would benefit from AD during mobility for decreased fall risk, defer to PT for rec.     Anticipated Discharge Disposition (OT): assisted living                        Astrid Lizarraga, PT   Physical Therapist  Physical Therapy     Plan of Care     Signed     Date of Service: 02/13/25 0905  Creation Time: 02/13/25 0955     Signed         Goal Outcome Evaluation:  Plan of Care Reviewed With: grandchild(milena), patient  Outcome Evaluation: Pt presented below baseline with strength and endurance which impairs her functional mobility, she also has decreased safety awareness and decreased problem solving during mobility which are barriers to DC home alone. PT recommends SNF for optimal recovery.     Anticipated Discharge Disposition (PT): skilled nursing facility                                    History &  Physical        Marlyn Trinh MD at 25 1012              [facility] Medicine Services  HISTORY AND PHYSICAL    Patient Name: Monique Betts  : 1935  MRN: [id number]  Primary Care Physician: Sena Lomeli MD  Date of admission: 2025      Subjective  Subjective     Chief Complaint:  Generalized weakness    HPI:  Monique Betts is a 89 y.o. female with a PMH significant for CAD s/p PCI, chronic back pain, chronic urinary incontinence s/p bladder stimulator, dementia with cognitive decline as well as left hip and left shoulder osteoarthritis.  She presents to [facility] ED with her daughter due to significant generalized weakness in her legs and upper extremities, endorsing mild suprapubic and LLQ abdominal pain (does not recall onset of pain), with mild nausea without vomiting and subjective chills.  Also endorsing lack of appetite with significant decline in fluid intake, states has been many days since she had a meal.  She also endorses hallucinations, states she hallucinated that she fell but she did not fall.  Appears that this occurred during previous admission as well and 2024.  Of note patient lives alone.  She denies any recent ill contacts, falls, chest pain, diarrhea, constipation, hematochezia, melena, vomiting or hematemesis.    In the ED, patient was hemodynamically stable, troponins 20 - - 20, equivocal, no significant new ST changes on EKG.  Creatinine 1.01 (baseline), glucose 130, TSH 2.450, total cholesterol 147, triglycerides 117, lipase 285, procalcitonin 0.04, lactic acid 0.9, no leukocytosis or anemia with hemoglobin 13.5.  UA negative for pyuria or bacteriuria, ketones 40 mg/dL.  COVID/flu PCR negative.  Ethanol level WNL.  UDS negative.  CT abdomen pelvis revealed diverticulosis without diverticulitis, no evidence of pancreatitis, liver cysts evident.  No significant bladder distention.  CT head showing chronic changes  with no acute intracranial abnormality.  Received IV fluids.  Hospitalist team was contacted for admission, agreed to admit.      Personal History     Past Medical History:   Diagnosis Date    Cancer     Heart murmur 2000    Skin Cancer    Sleep apnea 1999    Not using machine for several years.           Past Surgical History:   Procedure Laterality Date    CARDIAC CATHETERIZATION N/A 06/08/2022    Procedure: LEFT HEART CATH;  Surgeon: Antonino Koo MD;  Location: [facility] CATH INVASIVE [facility];  Service: Cardiovascular;  Laterality: N/A;       Family History: family history includes Breast cancer (age of onset: 60) in her maternal aunt; Ovarian cancer (age of onset: 46) in her daughter.     Social History:  reports that she has never smoked. She has never used smokeless tobacco. She reports current alcohol use of about 7.0 standard drinks of alcohol per week. She reports that she does not use drugs.  Social History     Social History Narrative    Not on file       Medications:  Available home medication information reviewed.  Diclofenac Sodium, Magnesium, QUEtiapine, alendronate, aspirin, donepezil, fluticasone, losartan, meclizine, melatonin, multivitamin with minerals, nitroglycerin, pregabalin, rosuvastatin, and vitamin b complex    Allergies   Allergen Reactions    Pb-Hyoscy-Atropine-Scopolamine Anaphylaxis    Lisinopril Hives       Objective  Objective     Vital Signs:   Temp:  [98.4 °F (36.9 °C)] 98.4 °F (36.9 °C)  Heart Rate:  [51-57] 51  Resp:  [18] 18  BP: (157-187)/(62-76) 166/67       Physical Exam   Constitutional: Awake, alert  Eyes: PERRLA, sclerae anicteric, no conjunctival injection  HENT: NCAT, mucous membranes moist  Neck: Supple, no thyromegaly, no lymphadenopathy, trachea midline  Respiratory: Clear to auscultation bilaterally, nonlabored respirations   Cardiovascular: RRR, no murmurs, rubs, or gallops, palpable pedal pulses bilaterally  Gastrointestinal: Positive bowel sounds, soft with  mildly tender LLQ and suprapubic region without guarding  Musculoskeletal: No bilateral ankle edema, no clubbing or cyanosis to extremities  Psychiatric: Appropriate affect, cooperative  Neurologic: Oriented x 3, strength symmetric in all extremities, Cranial Nerves grossly intact to confrontation, speech clear  Skin: No rashes      Result Review:  I have personally reviewed the results from the time of this admission to 2/12/2025 10:33 EST and agree with these findings:  [x]  Laboratory list / accordion  [x]  Microbiology  [x]  Radiology  [x]  EKG/Telemetry   [x]  Cardiology/Vascular   [x]  Pathology  [x]  Old records  []  Other:        LAB RESULTS:      Lab 02/12/25  0721   WBC 4.85   HEMOGLOBIN 13.5   HEMATOCRIT 40.0   PLATELETS 147   NEUTROS ABS 2.53   IMMATURE GRANS (ABS) 0.01   LYMPHS ABS 1.77   MONOS ABS 0.47   EOS ABS 0.04   MCV 92.8   CRP <0.30   PROCALCITONIN 0.04   LACTATE 0.9         Lab 02/12/25  0721   SODIUM 141   POTASSIUM 3.9   CHLORIDE 104   CO2 25.0   ANION GAP 12.0   BUN 15   CREATININE 1.01*   EGFR 53.7*   GLUCOSE 130*   CALCIUM 9.4   MAGNESIUM 2.2   PHOSPHORUS 3.3   TSH 2.450         Lab 02/12/25  0721   TOTAL PROTEIN 6.8   ALBUMIN 4.3   GLOBULIN 2.5   ALT (SGPT) 16   AST (SGOT) 19   BILIRUBIN 0.4   ALK PHOS 62   LIPASE 285*         Lab 02/12/25  0845 02/12/25  0721   PROBNP  --  359.2   HSTROP T 20* 20*         Lab 02/12/25  0721   CHOLESTEROL 147   LDL CHOL 65   HDL CHOL 61*   TRIGLYCERIDES 117             Lab 02/12/25  1024   PH, ARTERIAL 7.427   PCO2, ARTERIAL 40.9   PO2 ART 75.6*   FIO2 21   HCO3 ART 26.9*   BASE EXCESS ART 2.3*   CARBOXYHEMOGLOBIN 1.3     UA          10/25/2024    16:45 2/12/2025    07:34   Urinalysis   Squamous Epithelial Cells, UA 3-6     Specific Gravity, UA 1.015  1.017    Ketones, UA 15 mg/dL (1+)  40 mg/dL (2+)    Blood, UA Trace  Negative    Leukocytes, UA Moderate (2+)  Negative    Nitrite, UA Negative  Negative    RBC, UA 0-2     WBC, UA 6-10     Bacteria, UA 2+          Microbiology Results (last 10 days)       Procedure Component Value - Date/Time    COVID PRE-OP / PRE-PROCEDURE SCREENING ORDER (NO ISOLATION) - Swab, Nasopharynx [[id number]]  (Normal) Collected: 02/12/25 0706    Lab Status: Final result Specimen: Swab from Nasopharynx Updated: 02/12/25 0754    Narrative:      The following orders were created for panel order COVID PRE-OP / PRE-PROCEDURE SCREENING ORDER (NO ISOLATION) - Swab, Nasopharynx.  Procedure                               Abnormality         Status                     ---------                               -----------         ------                     COVID-19, FLU A/B, RSV P...[[id number]]  Normal              Final result                 Please view results for these tests on the individual orders.    COVID-19, FLU A/B, RSV PCR 1 HR TAT - Swab, Nasopharynx [[id number]]  (Normal) Collected: 02/12/25 0706    Lab Status: Final result Specimen: Swab from Nasopharynx Updated: 02/12/25 0754     COVID19 Not Detected     Influenza A PCR Not Detected     Influenza B PCR Not Detected     RSV, PCR Not Detected    Narrative:      Fact sheet for providers: https://www.fda.gov/media/086891/download    Fact sheet for patients: https://www.fda.gov/media/835518/download    Test performed by PCR.            CT Abdomen Pelvis With Contrast    Result Date: 2/12/2025  CT ABDOMEN PELVIS W CONTRAST Date of Exam: 2/12/2025 7:59 AM EST Indication: Abdominal distention with epigastric tenderness, nausea and vomiting, altered mental status. Comparison: CT abdomen and pelvis 4/23/2023 Technique: Axial CT images were obtained of the abdomen and pelvis following the uneventful intravenous administration of 85 mL Isovue-300. Reconstructed coronal and sagittal images were also obtained. Automated exposure control and iterative construction methods were used. Findings: Lung bases appear grossly clear. Redemonstration of a couple scattered liver cysts, with otherwise unremarkable  appearance of the liver. Unremarkable appearance of the gallbladder and bile ducts. Normal size and appearance of the spleen. Unremarkable appearance of the pancreas. Normal appearance of the adrenal glands. Unremarkable appearance of the kidneys, ureters, bladder, uterus, and adnexa. There is severe sigmoid diverticulosis without evidence of diverticulitis. Normal appendix. No bowel obstruction. There is a small sliding hiatal hernia. No inflammatory change of the GI tract. No abdominopelvic free fluid or fat stranding. No pneumoperitoneum. There is atherosclerosis of the abdominal aorta unremarkable appearance of the vasculature. No bulky or suspicious abdominopelvic lymph nodes. The body wall soft tissues are unremarkable. There is a left-sided sacral nerve stimulator with right-sided pulse generator. No acute or suspicious bony findings.     Impression: Impression: No acute abdominopelvic findings. Sigmoid diverticulosis without diverticulitis. Unremarkable CT appearance of the pancreas. Electronically Signed: Luis Miguel Aguilar MD  2/12/2025 8:30 AM EST  Workstation ID: [id number]    CT Head Without Contrast    Result Date: 2/12/2025  CT HEAD WO CONTRAST Date of Exam: 2/12/2025 7:59 AM EST Indication: altered mental status, suspect metabolic. Comparison: October 25, 2024 Technique: Axial CT images were obtained of the head without contrast administration.  Automated exposure control and iterative construction methods were used. Findings: No acute intracranial hemorrhage or extra-axial collection is identified. The ventricles appear normal in caliber, with no evidence of mass effect or midline shift. The basal cisterns appear patent. The gray-white differentiation appears preserved. The calvarium appear intact. The paranasal sinuses are clear. The mastoid air cells are well-aerated. Subtle foci of periventricular and subcortical white matter hypodensities are nonspecific, but likely the sequela of mild chronic small  vessel ischemic disease.     Impression: Impression: 1.No acute intracranial process identified. 2.Findings suggestive of mild chronic small vessel ischemic disease. Electronically Signed: Alvarez Cunha MD  2/12/2025 8:24 AM EST  Workstation ID: [id number]    XR Chest 1 View    Result Date: 2/12/2025  XR CHEST 1 VW Date of Exam: 2/12/2025 7:42 AM EST Indication: dyspnea, altered mental status Comparison: June 13, 2022 FINDINGS: No definite focal or diffuse pulmonary infiltrate is identified.  No pneumothorax or significant pleural effusion. The heart appears mildly enlarged. Calcific atherosclerosis of the aortic arch. Mediastinal contour appears grossly unchanged.     Impression: 1.Mild cardiomegaly. 2.No radiographic findings of acute pulmonary abnormality. Electronically Signed: Cabrera Bingham  2/12/2025 8:03 AM EST  Workstation ID: [id number]     Results for orders placed during the hospital encounter of 10/25/24    Adult Transthoracic Echo Complete W/ Cont if Necessary Per Protocol    Interpretation Summary    Left ventricular ejection fraction appears to be 56 - 60%.    Estimated right ventricular systolic pressure from tricuspid regurgitation is normal (<35 mmHg).    No significant valvular disease      Assessment & Plan  Assessment & Plan       Coronary artery disease involving native coronary artery of native heart without angina pectoris    Dyslipidemia, goal LDL below 70    Essential hypertension    GERD without esophagitis    Abdominal pain    Elevated lipase    Dementia without behavioral disturbance    Social problem    Delirium    Hallucination    Protein calorie malnutrition    Monique Betts is a 89 y.o. female with a PMH significant for CAD s/p PCI, chronic back pain, chronic urinary incontinence s/p bladder stimulator, dementia with cognitive decline as well as left hip and left shoulder osteoarthritis.  She presents to [facility] ED with her daughter due to significant generalized  weakness in her legs and upper extremities, endorsing mild suprapubic and LLQ abdominal pain (does not recall onset of pain), with mild nausea without vomiting and subjective chills.  Also endorsing lack of appetite with significant decline in fluid intake, states has been many days since she had a meal.  She also endorses hallucinations, states she hallucinated that she fell but she did not fall.  Labs and imaging largely unremarkable for acute findings, will consult case management for placement as patient is not safe to live alone under these conditions.    Generalized weakness  Protein calorie malnutrition  Lack of oral intake  Abdominal pain  Elevated lipase levels  Patient presented due to generalized weakness with suprapubic and LLQ abdominal pain associated with a decline in p.o. intake over the past few days  CT abdomen pelvis negative for any acute findings, evidence of liver cysts, diverticulosis without diverticulitis and no evidence of pancreatitis  Labs are largely unremarkable, isolated hyperlipasemia of 285, no significant epigastric pain radiating to back or CT findings suggestive of acute pancreatitis  Ketones 40 mg/dL on UA, no evidence of leukocyte esterase, pyuria or bacteriuria  Will check GGT and amylase levels, check CK levels as well as ABG  Treat nausea with Zofran  Consult to nutrition services  Simethicone for flatulence scheduled twice daily  Encourage oral intake  Give IV fluids 100 mL/h for 10 hours    Lives alone  Cognitive impairment with dementia  Hypoactive delirium previous admission  Social problem  Hallucination  Chronic urinary incontinence s/p bladder stim   Patient's daughter by the bedside endorses she has dementia however she is oriented to person, time and place, states it is waxing and waning  Patient having hallucinations that she had a fall however did not fall, appears she was having hallucinations during previous admission in November 2024, concerns for hypoactive  delirium at the time.  Initially concern of possible alcohol withdrawal however she was not scoring very high on it.  Was discharged on Seroquel to regulate her sleep cycle  Unsafe for patient to live alone at this time, will place consult to case management for disposition  Continue home donepezil  Given her baseline dementia, if she undergoes hyper/hypoactive delirium can consider Depakote sprinkles as it has been shown to be more effective in elderly folks with dementia.  However for now we will continue her Seroquel.    CAD s/p PCI  Continue home statin and aspirin    HTN  Continue home medications of losartan, hold off on rate controlling agents due to sinus bradycardia    GERD  Continue home medications    Acute debility  H/o chronic back pain, L5/S1 radiculopathy  Left hip and left shoulder arthritis with pain  Patient denies orthostasis, denies worsening back pain or saddle anesthesia   MRI L-spine 10/30/2024 with degenerative changes of lumbar spine  Left hip pain and left shoulder pain likely secondary to arthritis.  X-ray with evidence of severe arthritis without any acute fractures previously  PT/OT consulted    VTE Prophylaxis:  Pharmacologic & mechanical VTE prophylaxis orders are present.    Total time spent: 80 minutes  Time spent includes time reviewing chart, face-to-face time, counseling patient/family/caregiver, ordering medications/tests/procedures, communicating with other health care professionals, documenting clinical information in the electronic health record, and coordination of care.        CODE STATUS:    Code Status and Medical Interventions: No CPR (Do Not Attempt to Resuscitate); Limited Support; No intubation (DNI)   Ordered at: 02/12/25 1005     Medical Intervention Limits:    No intubation (DNI)     Level Of Support Discussed With:    Health Care Surrogate    Patient     Code Status (Patient has no pulse and is not breathing):    No CPR (Do Not Attempt to Resuscitate)     Medical  Interventions (Patient has pulse or is breathing):    Limited Support       Expected Discharge   Expected discharge date/ time has not been documented.     Marlyn Trinh MD  25      Electronically signed by Marlyn Trinh MD at 25 1044          Physician Progress Notes (most recent note)        Deepa Moreno, GRACY at 25 0815              [facility] Medicine Services  PROGRESS NOTE    Patient Name: Monique Betts  : 1935  MRN: [id number]    Date of Admission: 2025  Primary Care Physician: Sena Lomeli MD    Subjective   Subjective     CC:  Mental status change     HPI:  Patient is resting in bed in NAD> tolerating diet. Had bm on 2/15. States throat sore this am having some nasal drainage       Objective   Objective     Vital Signs:   Temp:  [97.9 °F (36.6 °C)-98.3 °F (36.8 °C)] 98 °F (36.7 °C)  Heart Rate:  [46-69] 55  Resp:  [14-18] 16  BP: (113-162)/(39-84) 162/62     Physical Exam:  Constitutional: No acute distress, awake, alert  HENT: NCAT, mucous membranes moist  Respiratory: Clear to auscultation bilaterally, respiratory effort normal room air 94%  Cardiovascular: RRR, no murmurs, rubs, or gallops  Gastrointestinal: Positive bowel sounds, soft, nontender, nondistended  Musculoskeletal: No bilateral ankle edema  Psychiatric: Appropriate affect, cooperative  Neurologic: Oriented x 2-3, strength symmetric in all extremities, , speech clear  Skin: No rashes      Results Reviewed:  LAB RESULTS:      Lab 25  0519 25  0412 25  1052 25  0845 25  0721   WBC 5.39 4.57  --   --  4.85   HEMOGLOBIN 12.8 12.0  --   --  13.5   HEMATOCRIT 39.0 36.2  --   --  40.0   PLATELETS 141 122*  --   --  147   NEUTROS ABS  --   --   --   --  2.53   IMMATURE GRANS (ABS)  --   --   --   --  0.01   LYMPHS ABS  --   --   --   --  1.77   MONOS ABS  --   --   --   --  0.47   EOS ABS  --   --   --   --  0.04   MCV 95.4 93.8  --   --  92.8    CRP  --   --   --   --  <0.30   PROCALCITONIN  --   --   --   --  0.04   LACTATE  --   --   --   --  0.9   LDH  --   --   --  198  --    PROTIME  --   --  12.6  --   --    HSTROP T  --   --   --  20* 20*         Lab 02/14/25  0519 02/13/25  0412 02/12/25  0721   SODIUM 140 140 141   POTASSIUM 4.0 4.0 3.9   CHLORIDE 107 106 104   CO2 26.0 24.0 25.0   ANION GAP 7.0 10.0 12.0   BUN 17 12 15   CREATININE 1.13* 0.89 1.01*   EGFR 46.9* 62.4 53.7*   GLUCOSE 113* 101* 130*   CALCIUM 8.7 8.6 9.4   MAGNESIUM 2.2 2.2 2.2   PHOSPHORUS  --   --  3.3   HEMOGLOBIN A1C  --   --  6.00*   TSH  --   --  2.450         Lab 02/12/25  0721   TOTAL PROTEIN 6.8   ALBUMIN 4.3   GLOBULIN 2.5   ALT (SGPT) 16   AST (SGOT) 19   BILIRUBIN 0.4   ALK PHOS 62   LIPASE 285*         Lab 02/12/25  1052 02/12/25  0845 02/12/25  0721   PROBNP  --   --  359.2   HSTROP T  --  20* 20*   PROTIME 12.6  --   --    INR 0.93  --   --          Lab 02/12/25  0721   CHOLESTEROL 147   LDL CHOL 65   HDL CHOL 61*   TRIGLYCERIDES 117             Lab 02/12/25  1024   PH, ARTERIAL 7.427   PCO2, ARTERIAL 40.9   PO2 ART 75.6*   FIO2 21   HCO3 ART 26.9*   BASE EXCESS ART 2.3*   CARBOXYHEMOGLOBIN 1.3     Brief Urine Lab Results  (Last result in the past 365 days)        Color   Clarity   Blood   Leuk Est   Nitrite   Protein   CREAT   Urine HCG        02/12/25 0734 Yellow   Clear   Negative   Negative   Negative   Negative                   Microbiology Results Abnormal       None            No radiology results from the last 24 hrs    Results for orders placed during the hospital encounter of 10/25/24    Adult Transthoracic Echo Complete W/ Cont if Necessary Per Protocol    Interpretation Summary    Left ventricular ejection fraction appears to be 56 - 60%.    Estimated right ventricular systolic pressure from tricuspid regurgitation is normal (<35 mmHg).    No significant valvular disease      Current medications:  Scheduled Meds:aspirin, 81 mg, Oral, Daily  cetirizine, 5  mg, Oral, Daily  donepezil, 10 mg, Oral, Daily  enoxaparin, 40 mg, Subcutaneous, Daily  fluticasone, 2 spray, Each Nare, Daily  losartan, 25 mg, Oral, Q24H  melatonin, 10 mg, Oral, Nightly  polyethylene glycol, 17 g, Oral, BID  pregabalin, 50 mg, Oral, TID  QUEtiapine, 25 mg, Oral, Nightly  rosuvastatin, 10 mg, Oral, Daily  simethicone, 80 mg, Oral, BID  sodium chloride, 10 mL, Intravenous, Q12H      Continuous Infusions:   PRN Meds:.  acetaminophen **OR** acetaminophen **OR** acetaminophen    senna-docusate sodium **AND** polyethylene glycol **AND** bisacodyl **AND** bisacodyl    Calcium Replacement - Follow Nurse / BPA Driven Protocol    Magnesium Standard Dose Replacement - Follow Nurse / BPA Driven Protocol    meclizine    nitroglycerin    ondansetron ODT    phenol    Phosphorus Replacement - Follow Nurse / BPA Driven Protocol    Potassium Replacement - Follow Nurse / BPA Driven Protocol    sodium chloride    sodium chloride    Assessment & Plan   Assessment & Plan     Active Hospital Problems    Diagnosis  POA    Severe protein-calorie malnutrition [E43]  Yes    Abdominal pain [R10.9]  Yes    Elevated lipase [R74.8]  Unknown    Dementia without behavioral disturbance [F03.90]  Unknown    Social problem [Z60.9]  Not Applicable    Delirium [R41.0]  Unknown    Hallucination [R44.3]  Unknown    Protein calorie malnutrition [E46]  Unknown    GERD without esophagitis [K21.9]  Yes    Essential hypertension [I10]  Yes    Dyslipidemia, goal LDL below 70 [E78.5]  Yes    Coronary artery disease involving native coronary artery of native heart without angina pectoris [I25.10]  Yes      Resolved Hospital Problems   No resolved problems to display.        Brief Hospital Course to date:  Monique Betts is a 89 y.o. female with a PMH significant for CAD s/p PCI, chronic back pain, chronic urinary incontinence s/p bladder stimulator, dementia with cognitive decline as well as left hip and left shoulder osteoarthritis.   Admitted with significant generalized weakness in her legs and upper extremities, endorsing mild suprapubic and LLQ abdominal pain (does not recall onset of pain), with mild nausea without vomiting and subjective chills.  Noted to have lack of appetite with significant decline in fluid intake, + hallucinations..     This patient's problems and plans were partially entered by my partner and updated as appropriate by me 02/16/25.      Nausea, diarrhea, abdominal pain -> resolved  -Possible viral illness PTA, now resolved, patient eating  -CT imaging negative      Dementia  Visual/Auditory Hallucinations   -Patient started on Seroquel last admit in Oct/Nov '24, this was continued after her DC from [facility]  -Patient states she hasn't been taking it the past few days -? likely the cause of her hallucinations vs progression of her dementia   -UA negative, CT head negative  -Delirium precautions     CAD s/p PCI  -Continue home statin and aspirin     Constipation  -add scheduled miralax     HTN  -Continue Losartan      Urinary Incontinence  -S/P bladder stimulator per Dr Smith 8/2023   -Has paperwork and remote with her  -Has had issues with retention since admission      H/o chronic back pain, L5/S1 radiculopathy  Left hip and left shoulder arthritis with pain  -Follows with , supposed to have an Epidural injection soon, will need to reschedule her appt  -PT/OT recommending SNF  -plan for JAMIR v transition to LTC, needs 24/7 care     My partner Discussed with patient's son over the phone regarding medical readiness.  He states he is not able to come and get her but he has made arrangements for her to be moved to [facility] on Monday.  He is unwilling to come pick her up over the weekend because he says it is going to rain 6 inches and he has a farm in a family and does not want to move furniture in the rain.    Expected Discharge Location and Transportation: home   Expected Discharge   Expected Discharge  Date: 2025; Expected Discharge Time:      VTE Prophylaxis:  Pharmacologic & mechanical VTE prophylaxis orders are present.         AM-PAC 6 Clicks Score (PT): 16 (02/15/25 2000)    CODE STATUS:   Code Status and Medical Interventions: No CPR (Do Not Attempt to Resuscitate); Limited Support; No intubation (DNI)   Ordered at: 25 1005     Medical Intervention Limits:    No intubation (DNI)     Level Of Support Discussed With:    Health Care Surrogate    Patient     Code Status (Patient has no pulse and is not breathing):    No CPR (Do Not Attempt to Resuscitate)     Medical Interventions (Patient has pulse or is breathing):    Limited Support       GRACY Boyd  25        Electronically signed by Deepa Moreno APRN at 25 1146            Astrid Lizarraga, PT   Physical Therapist  Physical Therapy     Therapy Evaluation     Signed     Date of Service: 25  Creation Time: 25     Signed       Expand All Collapse All    Patient Name: Monique Betts                   : 1935                        MRN: [id number]                              Today's Date: 2025                                   Admit Date: 2025                        Visit Dx:   Visit Diagnosis       ICD-10-CM ICD-9-CM   1. Acute pancreatitis without infection or necrosis, unspecified pancreatitis type  K85.90 577.0   2. Encephalopathy acute  G93.40 348.30   3. Dementia with agitation, unspecified dementia severity, unspecified dementia type  F03.911 294.21   4. Essential hypertension  I10 401.9   5. Cognitive decline  R41.89 294.9   6. Failure to thrive in adult  R62.7 783.7   7. Coronary artery disease involving native coronary artery of native heart without angina pectoris  I25.10 414.01         Problem List       Patient Active Problem List   Diagnosis    NSTEMI (non-ST elevated myocardial infarction)    COVID-19 virus infection    Coronary artery disease involving native  coronary artery of native heart without angina pectoris    Dyslipidemia, goal LDL below 70    Essential hypertension    Failure to thrive in adult    Cognitive decline    GERD without esophagitis    Pancreatitis    Abdominal pain    Elevated lipase    Dementia without behavioral disturbance    Social problem    Delirium    Hallucination    Protein calorie malnutrition         Medical History        Past Medical History:   Diagnosis Date    Cancer      Heart murmur 2000     Skin Cancer    Sleep apnea 1999     Not using machine for several years.         Surgical History         Past Surgical History:   Procedure Laterality Date    CARDIAC CATHETERIZATION N/A 06/08/2022     Procedure: LEFT HEART CATH;  Surgeon: Antonino Koo MD;  Location: [facility] CATH INVASIVE LOCATION;  Service: Cardiovascular;  Laterality: N/A;           General Information         Row Name 02/13/25 0941                 Physical Therapy Time and Intention     Document Type evaluation  -LW       Mode of Treatment physical therapy  -LW          Row Name 02/13/25 0941                 General Information     Patient Profile Reviewed yes  -LW       Prior Level of Function independent:;all household mobility;gait;transfer;min assist:;ADL's;bathing;using stairs;dependent:;driving;shopping  [facility] present for details of history  -LW       Existing Precautions/Restrictions fall;other (see comments)  dementia; LBP; bladder stimlulator; per [facility] plan is to not return home alone for safety  -LW       Barriers to Rehab medically complex;cognitive status  -LW          Row Name 02/13/25 0941                 Living Environment     People in Home alone  -LW          Row Name 02/13/25 0941                 Home Main Entrance     Number of Stairs, Main Entrance one  -LW       Stair Railings, Main Entrance railings on both sides of stairs  -LW          Row Name 02/13/25 0941                 Cognition     Orientation Status (Cognition) oriented x 3  -LW           Row Name 02/13/25 0941                 Safety Issues/Impairments Affecting Functional Mobility     Safety Issues Affecting Function (Mobility) ability to follow commands;awareness of need for assistance;impulsivity;insight into deficits/self-awareness;judgment;problem-solving;safety precaution awareness;safety precautions follow-through/compliance  -LW       Impairments Affecting Function (Mobility) balance;cognition;endurance/activity tolerance;motor control;strength  -LW       Cognitive Impairments, Mobility Safety/Performance attention;insight into deficits/self-awareness;problem-solving/reasoning;safety precaution awareness;safety precaution follow-through  -LW                       User Key  (r) = Recorded By, (t) = Taken By, (c) = Cosigned By        Initials Name Provider Type     LW Astrid Lizarraga PT Physical Therapist                            Mobility         Row Name 02/13/25 0945                 Bed Mobility     Bed Mobility scooting/bridging;supine-sit  -LW       Scooting/Bridging [place] (Bed Mobility) standby assist;1 person assist  -LW       Supine-Sit [place] (Bed Mobility) standby assist;1 person assist  -LW       Assistive Device (Bed Mobility) bed rails;head of bed elevated  -LW          Row Name 02/13/25 0945                 Bed-Chair Transfer     Bed-Chair [place] (Transfers) minimum assist (75% patient effort);1 person assist  -LW       Comment, (Bed-Chair Transfer) bed>BSC, Pt required setupA for noreen-care and encouragement to wash hands  -LW          Row Name 02/13/25 0945                 Sit-Stand Transfer     Sit-Stand [place] (Transfers) contact guard;1 person assist  -LW          Row Name 02/13/25 0945                 Gait/Stairs (Locomotion)     [place] Level (Gait) minimum assist (75% patient effort);1 person assist;other (see comments)  CGA<>Daniel  -LW       Patient was able to Ambulate yes  -LW       Distance in Feet (Gait) 100  -LW        Deviations/Abnormal Patterns (Gait) bilateral deviations;base of support, narrow;gait speed decreased;fausto decreased;stride length decreased;weight shifting decreased  -LW       Bilateral Gait Deviations decreased arm swing;forward flexed posture;heel strike decreased  -LW       [place] Level (Stairs) minimum assist (75% patient effort);1 person assist  -LW       Handrail Location (Stairs) right side (ascending)  -LW       Number of Steps (Stairs) 2  -LW       Ascending Technique (Stairs) step-over-step  -LW       Descending Technique (Stairs) step-over-step  -LW       Stairs, Safety Issues balance decreased during turns;weight-shifting ability decreased  -LW       Stairs, Impairments strength decreased;impaired balance;motor control impaired  -LW       Comment, (Gait/Stairs) Pt ambulated slow and guarded, reaching for walls and doors. Ocassional Daniel for obstacle avoidance and balance during turns.  -                       User Key  (r) = Recorded By, (t) = Taken By, (c) = Cosigned By        Initials Name Provider Type     LW Astrid Lizarraga PT Physical Therapist                            Obj/Interventions         Row Name 02/13/25 0948                 Range of Motion Comprehensive     General Range of Motion no range of motion deficits identified  -          Row Name 02/13/25 0948                 Strength Comprehensive (MMT)     General Manual Muscle Testing (MMT) Assessment lower extremity strength deficits identified  -       Comment, General Manual Muscle Testing (MMT) Assessment grossly observed 3+/5 BLE  -          Row Name 02/13/25 0948                 Advanced Stepping/Walking Interventions     Stepping/Walking Interventions side stepping  -          Row Name 02/13/25 0948                 Balance     Balance Assessment sitting static balance;sitting dynamic balance;standing static balance;standing dynamic balance  -       Static Sitting Balance supervision;1-person assist  -LW        Dynamic Sitting Balance standby assist;1-person assist  -LW       Position, Sitting Balance unsupported;sitting in chair;other (see comments)  BSC  -LW       Static Standing Balance contact guard;1-person assist  -LW       Dynamic Standing Balance minimal assist;1-person assist;other (see comments)  ranges CGA<>Daniel  -LW       Position/Device Used, Standing Balance unsupported  -LW       Balance Interventions sitting;standing;sit to stand;static;dynamic;minimal challenge;occupation based/functional task  -LW                       User Key  (r) = Recorded By, (t) = Taken By, (c) = Cosigned By        Initials Name Provider Type     LW Astrid Lizarraga, PT Physical Therapist                            Goals/Plan         Row Name 02/13/25 0953                 Transfer Goal 1 (PT)     Activity/Assistive Device (Transfer Goal 1, PT) sit-to-stand/stand-to-sit;bed-to-chair/chair-to-bed  -LW       [place] Level/Cues Needed (Transfer Goal 1, PT) modified independence  -LW       Time Frame (Transfer Goal 1, PT) short term goal (STG);5 days  -          Row Name 02/13/25 0953                 Gait Training Goal 1 (PT)     Activity/Assistive Device (Gait Training Goal 1, PT) gait (walking locomotion);other (see comments)  LRAD  -LW       [place] Level (Gait Training Goal 1, PT) modified independence  -LW       Distance (Gait Training Goal 1, PT) 150  -LW       Time Frame (Gait Training Goal 1, PT) long term goal (LTG);10 days  -          Row Name 02/13/25 0953                 Stairs Goal 1 (PT)     Activity/Assistive Device (Stairs Goal 1, PT) ascending stairs;descending stairs;using handrail, left;using handrail, right  -LW       [place] Level/Cues Needed (Stairs Goal 1, PT) standby assist  -LW       Number of Stairs (Stairs Goal 1, PT) 3  -LW       Time Frame (Stairs Goal 1, PT) long term goal (LTG);10 days  -LW          Row Name 02/13/25 0953                 Therapy Assessment/Plan (PT)     Planned Therapy  Interventions (PT) balance training;bed mobility training;gait training;home exercise program;postural re-education;patient/family education;neuromuscular re-education;motor coordination training;ROM (range of motion);stair training;strengthening;stretching;transfer training  -                    User Key  (r) = Recorded By, (t) = Taken By, (c) = Cosigned By        Initials Name Provider Type     LW Astrid Lizarraga, PT Physical Therapist                         Clinical Impression         Row Name 02/13/25 0950                 Pain     Additional Documentation Pain Scale: FACES Pre/Post-Treatment (Group)  -[facility] Name 02/13/25 0950                 Pain Scale: FACES Pre/Post-Treatment     Pain: FACES Scale, Pretreatment 0-->no hurt  -       Posttreatment Pain Rating 0-->no hurt  -[facility] Name 02/13/25 0950                 Plan of Care Review     Plan of Care Reviewed With grandchild(milena);patient  -LW       Outcome Evaluation Pt presented below baseline with strength and endurance which impairs her functional mobility, she also has decreased safety awareness and decreased problem solving during mobility which are barriers to DC home alone. PT recommends SNF for optimal recovery.  -          Row Name 02/13/25 0965                 Therapy Assessment/Plan (PT)     Patient/Family Therapy Goals Statement (PT) did not state  -LW       Rehab Potential (PT) fair  -       Criteria for Skilled Interventions Met (PT) yes;skilled treatment is necessary  -       Therapy Frequency (PT) daily  -LW       Predicted Duration of Therapy Intervention (PT) 10 days  -          Row Name 02/13/25 0911                 Positioning and Restraints     Pre-Treatment Position in bed  -LW       Post Treatment Position chair  -LW       In Chair notified nsg;call light within reach;encouraged to call for assist;exit alarm on;with family/caregiver;waffle cushion;legs elevated  -                       User Key  (r) =  Recorded By, (t) = Taken By, (c) = Cosigned By        Initials Name Provider Type     Astrid Little PT Physical Therapist                            Outcome Measures         Row Name 02/13/25 0954                 How much help from another person do you currently need...     Turning from your back to your side while in flat bed without using bedrails? 4  -LW       Moving from lying on back to sitting on the side of a flat bed without bedrails? 3  -LW       Moving to and from a bed to a chair (including a wheelchair)? 3  -LW       Standing up from a chair using your arms (e.g., wheelchair, bedside chair)? 3  -LW       Climbing 3-5 steps with a railing? 3  -LW       To walk in hospital room? 3  -LW       AM-PAC 6 Clicks Score (PT) 19  -LW       Highest Level of Mobility Goal 6 --> Walk 10 steps or more  -LW          Row Name 02/13/25 0954                 Functional Assessment     Outcome Measure Options AM-PAC 6 Clicks Basic Mobility (PT)  -LW                       User Key  (r) = Recorded By, (t) = Taken By, (c) = Cosigned By        Initials Name Provider Type     Astrid Little PT Physical Therapist                          Physical Therapy Education            Title: PT OT SLP Therapies (In Progress)         Topic: Physical Therapy (In Progress)         Point: Mobility training (In Progress)         Learning Progress Summary             Patient Acceptance, E, NR by CALEB at 2/13/2025 0955   Family Acceptance, E, NR by LW at 2/13/2025 0955                            Point: Home exercise program (Not Started)         Learner Progress:  Not documented in this visit.                  Point: Body mechanics (In Progress)         Learning Progress Summary             Patient Acceptance, E, NR by CALEB at 2/13/2025 0955   Family Acceptance, E, NR by LW at 2/13/2025 0955                            Point: Precautions (In Progress)         Learning Progress Summary             Patient Acceptance, E, NR by LW at 2/13/2025 0955    Family Acceptance, E, NR by LW at 2/13/2025 0955                                                User Key         Initials Effective Dates Name Provider Type Discipline      11/15/24 -  Astrid Lizarraga PT Physical Therapist PT                          PT Recommendation and Plan  Planned Therapy Interventions (PT): balance training, bed mobility training, gait training, home exercise program, postural re-education, patient/family education, neuromuscular re-education, motor coordination training, ROM (range of motion), stair training, strengthening, stretching, transfer training  Outcome Evaluation: Pt presented below baseline with strength and endurance which impairs her functional mobility, she also has decreased safety awareness and decreased problem solving during mobility which are barriers to DC home alone. PT recommends SNF for optimal recovery.      Time Calculation:   PT Evaluation Complexity  History, PT Evaluation Complexity: 3 or more personal factors and/or comorbidities  Examination of Body Systems (PT Eval Complexity): total of 4 or more elements  Clinical Presentation (PT Evaluation Complexity): stable  Clinical Decision Making (PT Evaluation Complexity): low complexity  Overall Complexity (PT Evaluation Complexity): low complexity       PT Charges         Row Name 02/13/25 0956                       Time Calculation     Start Time 0905  -LW         PT Received On 02/13/25  -LW         PT Goal Re-Cert Due Date 02/23/25  -LW                 Timed Charges     18140 - PT Therapeutic Activity Minutes 8  -LW                 Untimed Charges     PT Eval/Re-eval Minutes 50  -LW                 Total Minutes     Timed Charges Total Minutes 8  -LW         Untimed Charges Total Minutes 50  -LW          Total Minutes 58  -LW                      User Key  (r) = Recorded By, (t) = Taken By, (c) = Cosigned By        Initials Name Provider Type     LW Astrid Lizarraga PT Physical Therapist                        Therapy Charges for Today         Code Description Service Date Service Provider Modifiers Qty     85390410181 HC PT THERAPEUTIC ACT EA 15 MIN 2/13/2025 Astrid Lizarraga, PT GP 1     04192184563 HC PT EVAL LOW COMPLEXITY 4 2/13/2025 sAtrid Lizarraga, PT GP 1                PT G-Codes  Outcome Measure Options: AM-PAC 6 Clicks Basic Mobility (PT)  AM-PAC 6 Clicks Score (PT): 19  PT Discharge Summary  Anticipated Discharge Disposition (PT): skilled nursing facility     Astrid Lizarraga PT                      2/13/2025

## 2025-02-17 NOTE — DISCHARGE SUMMARY
[facility] Medicine Services  DISCHARGE SUMMARY    Patient Name: Monique Betts  : 1935  MRN: [id number]    Date of Admission: 2025  6:35 AM  Date of Discharge:  25  Primary Care Physician: Sena Lomeli MD    Consults       No orders found from 2025 to 2025.            Hospital Course     Presenting Problem: mental status changes     Active Hospital Problems    Diagnosis  POA    Severe protein-calorie malnutrition [E43]  Yes    Abdominal pain [R10.9]  Yes    Elevated lipase [R74.8]  Unknown    Dementia without behavioral disturbance [F03.90]  Unknown    Social problem [Z60.9]  Not Applicable    Delirium [R41.0]  Unknown    Hallucination [R44.3]  Unknown    Protein calorie malnutrition [E46]  Unknown    GERD without esophagitis [K21.9]  Yes    Essential hypertension [I10]  Yes    Dyslipidemia, goal LDL below 70 [E78.5]  Yes    Coronary artery disease involving native coronary artery of native heart without angina pectoris [I25.10]  Yes      Resolved Hospital Problems   No resolved problems to display.          Hospital Course:  Monique Betts is a 89 y.o. female  with a PMH significant for CAD s/p PCI, chronic back pain, chronic urinary incontinence s/p bladder stimulator, dementia with cognitive decline as well as left hip and left shoulder osteoarthritis.  Admitted with significant generalized weakness in her legs and upper extremities, endorsing mild suprapubic and LLQ abdominal pain (does not recall onset of pain), with mild nausea without vomiting and subjective chills.  Noted to have lack of appetite with significant decline in fluid intake, + hallucinations..      Nausea, diarrhea, abdominal pain -> resolved  -Possible viral illness PTA, now resolved, patient eating  -CT imaging negative      Dementia  Visual/Auditory Hallucinations   -Patient started on Seroquel last admit in Oct/Nov '24, this was continued after her DC from [facility]  -Patient states she hasn't been taking it the past few days -? likely the cause of her hallucinations vs progression of her dementia   -UA negative, CT head negative  -Delirium precautions     CAD s/p PCI  -Continue home statin and aspirin     Constipation  -add scheduled miralax     HTN  -Continue Losartan      Urinary Incontinence  -S/P bladder stimulator per Dr Smith 8/2023   -Has paperwork and remote with her  -Has had issues with retention since admission      H/o chronic back pain, L5/S1 radiculopathy  Left hip and left shoulder arthritis with pain  -Follows with , supposed to have an Epidural injection soon, will need to reschedule her appt  -PT/OT recommending SNF  -plan for shelter v transition to LTC, needs 24/7 care     My partner Discussed with patient's son over the phone regarding medical readiness.  He states he is not able to come and get her but he has made arrangements for her to be moved to [facility] on Monday.    Patient has remained clinically stable and will be discharged today.    Discharge Follow Up Recommendations for outpatient labs/diagnostics:   Follow up with pcp one week     Day of Discharge     HPI:   Patient is sitting up in bed in NAD. She states she feels good today. Throat soreness improved. Had good bm last night     Review of Systems  Gen- No fevers, chills  CV- No chest pain, palpitations  Resp- No cough, dyspnea  GI- No N/V/D, abd pain      Vital Signs:   Temp:  [96.9 °F (36.1 °C)-98.6 °F (37 °C)] 96.9 °F (36.1 °C)  Heart Rate:  [51-70] 67  Resp:  [18] 18  BP: (105-153)/(50-71) 144/54      Physical Exam:  onstitutional: No acute distress, awake, alert  HENT: NCAT, mucous membranes moist  Respiratory: Clear to auscultation bilaterally, respiratory effort normal room air 94%  Cardiovascular: RRR, no murmurs, rubs, or gallops  Gastrointestinal: Positive bowel sounds, soft, nontender, nondistended  Musculoskeletal: No bilateral ankle edema  Psychiatric: Appropriate  affect, cooperative  Neurologic: Oriented x 2-3, strength symmetric in all extremities, , speech clear  Skin: No rashes          Pertinent  and/or Most Recent Results     LAB RESULTS:      Lab 02/14/25  0519 02/13/25  0412 02/12/25  1052 02/12/25  0845 02/12/25  0721   WBC 5.39 4.57  --   --  4.85   HEMOGLOBIN 12.8 12.0  --   --  13.5   HEMATOCRIT 39.0 36.2  --   --  40.0   PLATELETS 141 122*  --   --  147   NEUTROS ABS  --   --   --   --  2.53   IMMATURE GRANS (ABS)  --   --   --   --  0.01   LYMPHS ABS  --   --   --   --  1.77   MONOS ABS  --   --   --   --  0.47   EOS ABS  --   --   --   --  0.04   MCV 95.4 93.8  --   --  92.8   CRP  --   --   --   --  <0.30   PROCALCITONIN  --   --   --   --  0.04   LACTATE  --   --   --   --  0.9   LDH  --   --   --  198  --    PROTIME  --   --  12.6  --   --          Lab 02/16/25  1345 02/14/25  0519 02/13/25  0412 02/12/25  0721   SODIUM 141 140 140 141   POTASSIUM 4.4 4.0 4.0 3.9   CHLORIDE 105 107 106 104   CO2 28.0 26.0 24.0 25.0   ANION GAP 8.0 7.0 10.0 12.0   BUN 16 17 12 15   CREATININE 1.16* 1.13* 0.89 1.01*   EGFR 45.4* 46.9* 62.4 53.7*   GLUCOSE 147* 113* 101* 130*   CALCIUM 9.0 8.7 8.6 9.4   MAGNESIUM  --  2.2 2.2 2.2   PHOSPHORUS  --   --   --  3.3   HEMOGLOBIN A1C  --   --   --  6.00*   TSH  --   --   --  2.450         Lab 02/12/25  0721   TOTAL PROTEIN 6.8   ALBUMIN 4.3   GLOBULIN 2.5   ALT (SGPT) 16   AST (SGOT) 19   BILIRUBIN 0.4   ALK PHOS 62   LIPASE 285*         Lab 02/12/25  1052 02/12/25  0845 02/12/25  0721   PROBNP  --   --  359.2   HSTROP T  --  20* 20*   PROTIME 12.6  --   --    INR 0.93  --   --          Lab 02/12/25  0721   CHOLESTEROL 147   LDL CHOL 65   HDL CHOL 61*   TRIGLYCERIDES 117             Lab 02/12/25  1024   PH, ARTERIAL 7.427   PCO2, ARTERIAL 40.9   PO2 ART 75.6*   FIO2 21   HCO3 ART 26.9*   BASE EXCESS ART 2.3*   CARBOXYHEMOGLOBIN 1.3     Brief Urine Lab Results  (Last result in the past 365 days)        Color   Clarity   Blood   Leuk  Est   Nitrite   Protein   CREAT   Urine HCG        02/12/25 0734 Yellow   Clear   Negative   Negative   Negative   Negative                 Microbiology Results (last 10 days)       Procedure Component Value - Date/Time    Blood Culture - Blood, Arm, Left [[id number]]  (Normal) Collected: 02/12/25 0845    Lab Status: Final result Specimen: Blood from Arm, Left Updated: 02/17/25 0915     Blood Culture No growth at 5 days    Blood Culture - Blood, Hand, Right [[id number]]  (Normal) Collected: 02/12/25 0721    Lab Status: Final result Specimen: Blood from Hand, Right Updated: 02/17/25 0801     Blood Culture No growth at 5 days    Narrative:      Less than seven (7) mL's of blood was collected.  Insufficient quantity may yield false negative results.    COVID PRE-OP / PRE-PROCEDURE SCREENING ORDER (NO ISOLATION) - Swab, Nasopharynx [[id number]]  (Normal) Collected: 02/12/25 0706    Lab Status: Final result Specimen: Swab from Nasopharynx Updated: 02/12/25 0754    Narrative:      The following orders were created for panel order COVID PRE-OP / PRE-PROCEDURE SCREENING ORDER (NO ISOLATION) - Swab, Nasopharynx.  Procedure                               Abnormality         Status                     ---------                               -----------         ------                     COVID-19, FLU A/B, RSV P...[[id number]]  Normal              Final result                 Please view results for these tests on the individual orders.    COVID-19, FLU A/B, RSV PCR 1 HR TAT - Swab, Nasopharynx [[id number]]  (Normal) Collected: 02/12/25 0706    Lab Status: Final result Specimen: Swab from Nasopharynx Updated: 02/12/25 0754     COVID19 Not Detected     Influenza A PCR Not Detected     Influenza B PCR Not Detected     RSV, PCR Not Detected    Narrative:      Fact sheet for providers: https://www.fda.gov/media/676245/download    Fact sheet for patients: https://www.fda.gov/media/981909/download    Test performed by PCR.            CT  Abdomen Pelvis With Contrast    Result Date: 2/12/2025  CT ABDOMEN PELVIS W CONTRAST Date of Exam: 2/12/2025 7:59 AM EST Indication: Abdominal distention with epigastric tenderness, nausea and vomiting, altered mental status. Comparison: CT abdomen and pelvis 4/23/2023 Technique: Axial CT images were obtained of the abdomen and pelvis following the uneventful intravenous administration of 85 mL Isovue-300. Reconstructed coronal and sagittal images were also obtained. Automated exposure control and iterative construction methods were used. Findings: Lung bases appear grossly clear. Redemonstration of a couple scattered liver cysts, with otherwise unremarkable appearance of the liver. Unremarkable appearance of the gallbladder and bile ducts. Normal size and appearance of the spleen. Unremarkable appearance of the pancreas. Normal appearance of the adrenal glands. Unremarkable appearance of the kidneys, ureters, bladder, uterus, and adnexa. There is severe sigmoid diverticulosis without evidence of diverticulitis. Normal appendix. No bowel obstruction. There is a small sliding hiatal hernia. No inflammatory change of the GI tract. No abdominopelvic free fluid or fat stranding. No pneumoperitoneum. There is atherosclerosis of the abdominal aorta unremarkable appearance of the vasculature. No bulky or suspicious abdominopelvic lymph nodes. The body wall soft tissues are unremarkable. There is a left-sided sacral nerve stimulator with right-sided pulse generator. No acute or suspicious bony findings.     Impression: No acute abdominopelvic findings. Sigmoid diverticulosis without diverticulitis. Unremarkable CT appearance of the pancreas. Electronically Signed: Luis Miguel Aguilar MD  2/12/2025 8:30 AM EST  Workstation ID: [id number]    CT Head Without Contrast    Result Date: 2/12/2025  CT HEAD WO CONTRAST Date of Exam: 2/12/2025 7:59 AM EST Indication: altered mental status, suspect metabolic. Comparison: October 25, 2024  Technique: Axial CT images were obtained of the head without contrast administration.  Automated exposure control and iterative construction methods were used. Findings: No acute intracranial hemorrhage or extra-axial collection is identified. The ventricles appear normal in caliber, with no evidence of mass effect or midline shift. The basal cisterns appear patent. The gray-white differentiation appears preserved. The calvarium appear intact. The paranasal sinuses are clear. The mastoid air cells are well-aerated. Subtle foci of periventricular and subcortical white matter hypodensities are nonspecific, but likely the sequela of mild chronic small vessel ischemic disease.     Impression: 1.No acute intracranial process identified. 2.Findings suggestive of mild chronic small vessel ischemic disease. Electronically Signed: Alvarez Cunha MD  2/12/2025 8:24 AM EST  Workstation ID: [id number]    XR Chest 1 View    Result Date: 2/12/2025  XR CHEST 1 VW Date of Exam: 2/12/2025 7:42 AM EST Indication: dyspnea, altered mental status Comparison: June 13, 2022 FINDINGS: No definite focal or diffuse pulmonary infiltrate is identified.  No pneumothorax or significant pleural effusion. The heart appears mildly enlarged. Calcific atherosclerosis of the aortic arch. Mediastinal contour appears grossly unchanged.     1.Mild cardiomegaly. 2.No radiographic findings of acute pulmonary abnormality. Electronically Signed: Cabrera Bingham  2/12/2025 8:03 AM EST  Workstation ID: [id number]             Results for orders placed during the hospital encounter of 10/25/24    Adult Transthoracic Echo Complete W/ Cont if Necessary Per Protocol    Interpretation Summary    Left ventricular ejection fraction appears to be 56 - 60%.    Estimated right ventricular systolic pressure from tricuspid regurgitation is normal (<35 mmHg).    No significant valvular disease      Plan for Follow-up of Pending Labs/Results:     Discharge Details         Discharge Medications        New Medications        Instructions Start Date   bisacodyl 10 MG suppository  Commonly known as: DULCOLAX   10 mg, Rectal, Daily PRN      cetirizine 5 MG tablet  Commonly known as: zyrTEC   5 mg, Oral, Daily   Start Date: February 18, 2025     polyethylene glycol 17 g packet  Commonly known as: MIRALAX   17 g, Oral, 2 Times Daily      sennosides-docusate 8.6-50 MG per tablet  Commonly known as: PERICOLACE   2 tablets, Oral, 2 Times Daily PRN             Changes to Medications        Instructions Start Date   meclizine 25 MG tablet  Commonly known as: ANTIVERT  What changed:   how much to take  when to take this   25 mg, Oral, 3 Times Daily PRN             Continue These Medications        Instructions Start Date   alendronate 70 MG tablet  Commonly known as: FOSAMAX   70 mg, Every 7 Days      aspirin 81 MG chewable tablet   81 mg, Daily      donepezil 10 MG tablet  Commonly known as: ARICEPT   10 mg, Daily      Fiber 500 MG capsule   1 capsule, Daily      fluticasone 50 MCG/ACT nasal spray  Commonly known as: FLONASE   2 sprays, Daily      losartan 25 MG tablet  Commonly known as: COZAAR   25 mg, Oral, Daily      Magnesium 400 MG tablet   1 tablet, Daily      melatonin 5 MG tablet tablet   10 mg, Nightly      multivitamin with minerals tablet tablet   1 tablet, Daily      nitroglycerin 0.4 MG SL tablet  Commonly known as: Nitrostat   0.4 mg, Sublingual, Every 5 Minutes PRN, Take no more than 3 doses in 15 minutes.      pregabalin 50 MG capsule  Commonly known as: LYRICA   50 mg, Oral, 3 Times Daily      QUEtiapine 25 MG tablet  Commonly known as: SEROquel   25 mg, Oral, Nightly      rosuvastatin 10 MG tablet  Commonly known as: CRESTOR   10 mg, Daily      vitamin b complex capsule capsule   1 capsule, Daily             Stop These Medications      baclofen 10 MG tablet  Commonly known as: LIORESAL              Allergies   Allergen Reactions    Pb-Hyoscy-Atropine-Scopolamine Anaphylaxis     Lisinopril Hives         Discharge Disposition:  Home or Self Care    Diet:  Hospital:  Diet Order   Procedures    Diet: Regular/House; Fluid Consistency: Thin (IDDSI 0)       Diet Instructions       Diet: Regular/House Diet; Regular (IDDSI 7); Thin (IDDSI 0)      Discharge Diet: Regular/House Diet    Texture: Regular (IDDSI 7)    Fluid Consistency: Thin (IDDSI 0)             Activity:  Activity Instructions       Activity as Tolerated      Measure Blood Pressure      Measure Weight              Restrictions or Other Recommendations:         CODE STATUS:    Code Status and Medical Interventions: No CPR (Do Not Attempt to Resuscitate); Limited Support; No intubation (DNI)   Ordered at: 02/12/25 1005     Medical Intervention Limits:    No intubation (DNI)     Level Of Support Discussed With:    Health Care Surrogate    Patient     Code Status (Patient has no pulse and is not breathing):    No CPR (Do Not Attempt to Resuscitate)     Medical Interventions (Patient has pulse or is breathing):    Limited Support       Future Appointments   Date Time Provider Department Center   5/15/2025  2:15 PM Antonino Koo MD [facility]       Additional Instructions for the Follow-ups that You Need to Schedule       Ambulatory Referral to Home Health   As directed      Face to Face Visit Date: 2/14/2025   Follow-up provider for Plan of Care?: I treated the patient in an acute care facility and will not continue treatment after discharge.   Follow-up provider: SENA LOMELI [[id number]]   Reason/Clinical Findings: abdominal pain   Describe mobility limitations that make leaving home difficult: Lives alone, family to stay with her   Nursing/Therapeutic Services Requested: Skilled Nursing Physical Therapy   Skilled nursing orders: Medication education   PT orders: Strengthening   Frequency: Other (2-3 times a week)        Discharge Follow-up with PCP   As directed       Currently Documented PCP:    Sena Lomeli MD    PCP  Phone Number:    [phone]     Follow Up Details: follow up with pcp one week                      GRACY Boyd  02/17/25      Time Spent on Discharge:  I spent  45  minutes on this discharge activity which included: face-to-face encounter with the patient, reviewing the data in the system, coordination of the care with the nursing staff as well as consultants, documentation, and entering orders.

## 2025-02-17 NOTE — PLAN OF CARE
Problem: Adult Inpatient Plan of Care  Goal: Plan of Care Review  Outcome: Progressing  Flowsheets (Taken 2/17/2025 5562)  Progress: improving  Plan of Care Reviewed With: patient  Goal: Patient-Specific Goal (Individualized)  Outcome: Progressing  Goal: Absence of Hospital-Acquired Illness or Injury  Outcome: Progressing  Intervention: Identify and Manage Fall Risk  Description: Perform standard risk assessment on admission using a validated tool or comprehensive approach appropriate to the patient; reassess fall risk frequently, with change in status or transfer to another level of care.  Communicate risk to interprofessional healthcare team; ensure fall risk visible cue.  Determine need for increased observation, equipment and environmental modification, as well as use of supportive, nonskid footwear.  Adjust safety measures to individual needs and identified risk factors.  Reinforce the importance of active participation with fall risk prevention, safety, and physical activity with the patient and family.  Perform regular intentional rounding to assess need for position change, pain assessment and personal needs, including assistance with toileting.  Recent Flowsheet Documentation  Taken 2/17/2025 0426 by Winifred Snyder RN  Safety Promotion/Fall Prevention:   nonskid shoes/slippers when out of bed   safety round/check completed   room organization consistent   lighting adjusted   activity supervised   assistive device/personal items within reach   clutter free environment maintained  Taken 2/17/2025 0215 by Winifred Snyder RN  Safety Promotion/Fall Prevention:   nonskid shoes/slippers when out of bed   safety round/check completed   room organization consistent   lighting adjusted   activity supervised   assistive device/personal items within reach   clutter free environment maintained  Taken 2/17/2025 0030 by Winifred Snyder RN  Safety Promotion/Fall Prevention:   nonskid shoes/slippers when out  of bed   safety round/check completed   room organization consistent   lighting adjusted   activity supervised   assistive device/personal items within reach   clutter free environment maintained  Taken 2/16/2025 2215 by Winifred Snyder RN  Safety Promotion/Fall Prevention:   nonskid shoes/slippers when out of bed   safety round/check completed   room organization consistent   lighting adjusted   activity supervised   assistive device/personal items within reach   clutter free environment maintained  Taken 2/16/2025 2030 by Winifred Snyder RN  Safety Promotion/Fall Prevention:   nonskid shoes/slippers when out of bed   safety round/check completed   room organization consistent   lighting adjusted   activity supervised   assistive device/personal items within reach   clutter free environment maintained  Taken 2/16/2025 1930 by Winifred Snyder RN  Safety Promotion/Fall Prevention:   nonskid shoes/slippers when out of bed   safety round/check completed   room organization consistent   lighting adjusted   activity supervised   assistive device/personal items within reach   clutter free environment maintained  Intervention: Prevent Skin Injury  Description: Perform a screening for skin injury risk, such as pressure or moisture-associated skin damage on admission and at regular intervals throughout hospital stay.  Keep all areas of skin (especially folds) clean and dry.  Maintain adequate skin hydration.  Relieve and redistribute pressure and protect bony prominences and skin at risk for injury; implement measures based on patient-specific risk factors.  Match turning and repositioning schedule to clinical condition.  Encourage weight shift frequently; assist with reposition if unable to complete independently.  Float heels off bed; avoid pressure on the Achilles tendon.  Keep skin free from extended contact with medical devices.  Optimize nutrition and hydration.  Encourage functional activity and mobility,  as early as tolerated.  Use aids (e.g., slide boards, mechanical lift) during transfer.  Recent Flowsheet Documentation  Taken 2/17/2025 0426 by Winifred Snyder RN  Body Position: position changed independently  Taken 2/17/2025 0030 by Winifred Snyder RN  Body Position: position changed independently  Taken 2/16/2025 2215 by Winifred Snyder RN  Body Position: position changed independently  Taken 2/16/2025 2030 by Winifred Snyder RN  Body Position: position changed independently  Taken 2/16/2025 1930 by Winifred Snyder RN  Body Position: position changed independently  Intervention: Prevent Infection  Description: Maintain skin and mucous membrane integrity; promote hand, oral and pulmonary hygiene.  Optimize fluid balance, nutrition, sleep and glycemic control to maximize infection resistance.  Identify potential sources of infection early to prevent or mitigate progression of infection (e.g., wound, lines, devices).  Evaluate ongoing need for invasive devices; remove promptly when no longer indicated.  Review vaccination status.  Recent Flowsheet Documentation  Taken 2/17/2025 0426 by Winifred Snyder RN  Infection Prevention:   environmental surveillance performed   rest/sleep promoted   hand hygiene promoted  Taken 2/17/2025 0215 by Winifred Snyder RN  Infection Prevention:   environmental surveillance performed   rest/sleep promoted   hand hygiene promoted  Taken 2/17/2025 0030 by Winifred Snyder RN  Infection Prevention:   environmental surveillance performed   rest/sleep promoted   hand hygiene promoted  Taken 2/16/2025 2215 by Winifred Snyder RN  Infection Prevention:   environmental surveillance performed   rest/sleep promoted   hand hygiene promoted  Taken 2/16/2025 2030 by Winifred Snyder RN  Infection Prevention:   environmental surveillance performed   rest/sleep promoted   hand hygiene promoted  Taken 2/16/2025 1930 by Winifred Snyder RN  Infection  Prevention:   environmental surveillance performed   rest/sleep promoted   hand hygiene promoted  Goal: Optimal Comfort and Wellbeing  Outcome: Progressing  Intervention: Provide Person-Centered Care  Description: Use a family-focused approach to care; encourage support system presence and participation.  Develop trust and rapport by proactively providing information, encouraging questions, addressing concerns and offering reassurance.  Acknowledge emotional response to hospitalization.  Recognize and utilize personal coping strategies and strengths; develop goals via shared decision-making.  Honor spiritual and cultural preferences.  Recent Flowsheet Documentation  Taken 2/16/2025 1930 by Winifred Snyder RN  Trust Relationship/Rapport:   care explained   choices provided   emotional support provided   empathic listening provided   questions answered   questions encouraged   reassurance provided   thoughts/feelings acknowledged  Goal: Readiness for Transition of Care  Outcome: Progressing     Problem: Skin Injury Risk Increased  Goal: Skin Health and Integrity  Outcome: Progressing  Intervention: Optimize Skin Protection  Description: Perform a full pressure injury risk assessment, as indicated by screening, upon admission to care unit.  Reassess skin (full inspection and injury risk, including skin temperature, consistency and color) frequently (e.g., scheduled interval, with change in condition) to provide optimal early detection and prevention.  Maintain adequate tissue perfusion (e.g., encourage fluid balance; avoid crossing legs, constrictive clothing or devices) to promote tissue oxygenation.  Maintain head of bed at lowest degree of elevation tolerated, considering medical condition and other restrictions. Use positioning supports to prevent sliding and friction. Consider low friction textiles.  Avoid positioning onto an area that remains reddened or on bony prominences.  Minimize incontinence and  moisture (e.g., toileting schedule; moisture-wicking pad, diaper or incontinence collection device; skin moisture barrier).  Cleanse skin promptly and gently, when soiled, utilizing a pH-balanced cleanser.  Relieve and redistribute pressure (e.g., scheduled position changes, weight shifts, use of support surface, medical device repositioning, protective dressing application, use of positioning device, microclimate control, use of pressure-injury-monitor  Encourage increased activity, such as sitting in a chair at the bedside or early mobilization, when able to tolerate. Avoid prolonged sitting.  Recent Flowsheet Documentation  Taken 2/17/2025 0426 by Winifred Snyder RN  Head of Bed (HOB) Positioning: HOB elevated  Taken 2/17/2025 0215 by Winifred Snyder RN  Head of Bed (HOB) Positioning: HOB elevated  Taken 2/17/2025 0030 by Winifred Snyder RN  Head of Bed (HOB) Positioning: HOB elevated  Taken 2/16/2025 2215 by Winifred Snyder RN  Head of Bed (HOB) Positioning: HOB elevated  Taken 2/16/2025 2030 by Winifred Snyder RN  Head of Bed (HOB) Positioning: HOB elevated  Taken 2/16/2025 1930 by Winifred Snyder RN  Head of Bed (HOB) Positioning: HOB elevated     Problem: Fall Injury Risk  Goal: Absence of Fall and Fall-Related Injury  Outcome: Progressing  Intervention: Promote Injury-Free Environment  Description: Provide a safe, barrier-free environment that encourages independent activity.  Keep care area uncluttered and well-lighted.  Determine need for increased observation or monitoring.  Avoid use of devices that minimize mobility, such as restraints or indwelling urinary catheter.  Recent Flowsheet Documentation  Taken 2/17/2025 0426 by Winifred Snyder RN  Safety Promotion/Fall Prevention:   nonskid shoes/slippers when out of bed   safety round/check completed   room organization consistent   lighting adjusted   activity supervised   assistive device/personal items within reach   clutter  free environment maintained  Taken 2/17/2025 0215 by Winifred Snyder RN  Safety Promotion/Fall Prevention:   nonskid shoes/slippers when out of bed   safety round/check completed   room organization consistent   lighting adjusted   activity supervised   assistive device/personal items within reach   clutter free environment maintained  Taken 2/17/2025 0030 by Winifred Snyder RN  Safety Promotion/Fall Prevention:   nonskid shoes/slippers when out of bed   safety round/check completed   room organization consistent   lighting adjusted   activity supervised   assistive device/personal items within reach   clutter free environment maintained  Taken 2/16/2025 2215 by Winifred Snyder RN  Safety Promotion/Fall Prevention:   nonskid shoes/slippers when out of bed   safety round/check completed   room organization consistent   lighting adjusted   activity supervised   assistive device/personal items within reach   clutter free environment maintained  Taken 2/16/2025 2030 by Winifred Snyder RN  Safety Promotion/Fall Prevention:   nonskid shoes/slippers when out of bed   safety round/check completed   room organization consistent   lighting adjusted   activity supervised   assistive device/personal items within reach   clutter free environment maintained  Taken 2/16/2025 1930 by Winifred Snyder RN  Safety Promotion/Fall Prevention:   nonskid shoes/slippers when out of bed   safety round/check completed   room organization consistent   lighting adjusted   activity supervised   assistive device/personal items within reach   clutter free environment maintained   Goal Outcome Evaluation:  Plan of Care Reviewed With: patient        Progress: improving

## 2025-02-18 NOTE — OUTREACH NOTE
Prep Survey      Flowsheet Row Responses   [facility] facility patient discharged from? [place]   Is LACE score < 7 ? No   Eligibility Readm Mgmt   Discharge diagnosis Nausea, diarrhea, abdominal pain   Does the patient have one of the following disease processes/diagnoses(primary or secondary)? Other   Does the patient have Home health ordered? Yes   What is the Home health agency?  [facility]   Is there a DME ordered? No   General alerts for this patient [facility]   Prep survey completed? Yes            Priti ARGUELLO - Registered Nurse

## 2025-02-21 ENCOUNTER — READMISSION MANAGEMENT (OUTPATIENT)
Dept: CALL CENTER | Facility: HOSPITAL | Age: 89
End: 2025-02-21
Payer: MEDICARE

## 2025-02-21 NOTE — OUTREACH NOTE
Medical Week 1 Survey      Flowsheet Row Responses   [facility] patient discharged from? [place]   Does the patient have one of the following disease processes/diagnoses(primary or secondary)? Other   Week 1 attempt successful? No   Unsuccessful attempts Attempt 1            Mary Beth Montes De Oca Registered Nurse

## 2025-02-26 ENCOUNTER — READMISSION MANAGEMENT (OUTPATIENT)
Dept: CALL CENTER | Facility: HOSPITAL | Age: 89
End: 2025-02-26
Payer: MEDICARE

## 2025-02-26 NOTE — OUTREACH NOTE
"Medical Week 1 Survey      Flowsheet Row Responses   [facility] patient discharged from? [place]   Does the patient have one of the following disease processes/diagnoses(primary or secondary)? Other   Week 1 attempt successful? Yes   Call start time 1501   Call end time 1532   General alerts for this patient [facility]   Discharge diagnosis Nausea, diarrhea, abdominal pain   Person spoke with today (if not patient) and relationship patient   Medication alerts for this patient Pt requested a call for [facility] nurse be made to review meds.   Meds reviewed with patient/caregiver? Yes   Is the patient having any side effects they believe may be caused by any medication additions or changes? No   Does the patient have all medications ordered at discharge? Yes   Prescription comments Patient has seen provider who has adjusted meds and also gave 2 antibiotic injections.   Comments regarding appointments Pt has also seen urology   Does the patient have a primary care provider?  Yes   Does the patient have an appointment with their PCP within 7 days of discharge? Yes   Comments regarding PCP Pt has seen PCP 2x since discharge.  She states she is on an antibiotic   Has the patient kept scheduled appointments due by today? Yes   What is the Home health agency?  [facility]   Has home health visited the patient within 72 hours of discharge? Yes   Home health comments HH is visiting, PT also visiting   What DME was ordered? Patient is using walker due to feeling  unsteady   Psychosocial issues? No   Comments Pt states she hasnt eaten since friday due to nausea and stomach pain.  Provider has given antibiotics and \"something to coat her stomach\"  Later during conversation she states she did have yogurt with cereal, some soup, tea and coffee with cream and sugar.   Did the patient receive a copy of their discharge instructions? Yes   Nursing interventions Reviewed instructions with patient   What is the patient's " perception of their health status since discharge? Same   If the patient is a current smoker, are they able to teach back resources for cessation? Not a smoker   Week 1 call completed? Yes   Would this patient benefit from a Referral to [facility]? No   Is the patient interested in additional calls from an ambulatory ? No   Wrap up additional comments Patient has medication questions. Discussed meds with facility nurse at this time.  Patient was encouraged to return to ED if she starts to feel symptomatic r/t reduced intake.  She did have some intake today.  She does states she has felt a little better today.   Call end time 1532            BRAULIO ROBERTSON - Registered Nurse

## 2025-03-27 ENCOUNTER — TELEPHONE (OUTPATIENT)
Dept: CARDIOLOGY | Facility: CLINIC | Age: 89
End: 2025-03-27
Payer: MEDICARE

## 2025-03-27 NOTE — TELEPHONE ENCOUNTER
Home health nurse called stating that patient's resting heart rate has been ranging from 45-50bpm. Reports that patient is asymptomatic. Left voicemail for patient to call back to discuss,

## 2025-03-28 NOTE — TELEPHONE ENCOUNTER
Pt returned call from yesterday and left a voicemail.    I attempted to reach pt. No answer. Left voicemail for pt to return call.

## 2025-03-28 NOTE — TELEPHONE ENCOUNTER
Pt returned call. She states that the home health nurse told her that her heart rate is staying 45-50. Pt states she is feeling fine, and not having any symptoms. She does check her blood pressure daily in the morning, but has not written down her heart rates. She is going to start doing that and will call if her heart rate is staying below 50. She gave the following blood pressure readings:    3/23-94/45  3//51  3//56  3//54  3//88  3//47    She states the home health nurse only comes once a week to see her.

## 2025-05-07 ENCOUNTER — TELEPHONE (OUTPATIENT)
Dept: CARDIOLOGY | Facility: CLINIC | Age: 89
End: 2025-05-07
Payer: MEDICARE

## 2025-05-15 ENCOUNTER — OFFICE VISIT (OUTPATIENT)
Dept: CARDIOLOGY | Facility: CLINIC | Age: 89
End: 2025-05-15
Payer: MEDICARE

## 2025-05-15 VITALS
HEIGHT: 64 IN | BODY MASS INDEX: 25.27 KG/M2 | HEART RATE: 56 BPM | DIASTOLIC BLOOD PRESSURE: 48 MMHG | OXYGEN SATURATION: 97 % | WEIGHT: 148 LBS | SYSTOLIC BLOOD PRESSURE: 124 MMHG

## 2025-05-15 DIAGNOSIS — I10 ESSENTIAL HYPERTENSION: ICD-10-CM

## 2025-05-15 DIAGNOSIS — E78.5 DYSLIPIDEMIA, GOAL LDL BELOW 70: ICD-10-CM

## 2025-05-15 DIAGNOSIS — R41.89 COGNITIVE DECLINE: ICD-10-CM

## 2025-05-15 DIAGNOSIS — I25.10 CORONARY ARTERY DISEASE INVOLVING NATIVE CORONARY ARTERY OF NATIVE HEART WITHOUT ANGINA PECTORIS: Primary | ICD-10-CM

## 2025-05-15 NOTE — PROGRESS NOTES
OFFICE VISIT  NOTE  [facility] CARDIOLOGY      Name: Monique Betts    Date: 5/15/2025  MRN:  [id number]  :  1935      REFERRING/PRIMARY PROVIDER:  Sena Lomeli MD     Chief Complaint   Patient presents with    Coronary artery disease involving native coronary artery of     HPI: Monique Betts is a 89 y.o. female who presents today for follow up of CAD, hypertension. Had NSTEMI 2022, presented with midsternal chest tightness with activity, troponins slightly elevated to max 0.7, EKGs with nonspecific findings. Cardiac cath demonstrated moderate nonobstructive CAD with normal iFR of mid LAD lesion, normal LVEF. She was started on DAPT, Imdur and Lisinopril and discharged home. Lisinopril was discontinued due to concerns for allergic reaction with hives, now on Losartan.   She is doing well from cardiac standpoint, denies chest pain, unusual dyspnea, palpitations. She lives in Assisted Living at the [facility] and doing well. She does report some constant vibration feeling in her abdomen and back , has a sacral nerve stimulator in place.       ROS:Pertinent positives as listed in the HPI.  All other systems reviewed and negative.    Past Medical History:   Diagnosis Date    Cancer     Heart murmur     Skin Cancer    Sleep apnea     Not using machine for several years.       Past Surgical History:   Procedure Laterality Date    CARDIAC CATHETERIZATION N/A 2022    Procedure: LEFT HEART CATH;  Surgeon: Antonino Koo MD;  Location: [facility] CATH INVASIVE LOCATION;  Service: Cardiovascular;  Laterality: N/A;       Social History     Socioeconomic History    Marital status:    Tobacco Use    Smoking status: Never    Smokeless tobacco: Never   Vaping Use    Vaping status: Never Used   Substance and Sexual Activity    Alcohol use: Yes     Alcohol/week: 7.0 standard drinks of alcohol     Types: 7 Shots of liquor per week    Drug use: Never    Sexual activity: Not  "Currently     Partners: Male       Family History   Problem Relation Age of Onset    Breast cancer Maternal Aunt 60    Ovarian cancer Daughter 46        Allergies   Allergen Reactions    Pb-Hyoscy-Atropine-Scopolamine Anaphylaxis    Lisinopril Hives       Current Outpatient Medications   Medication Instructions    alendronate (FOSAMAX) 70 mg, Every 7 Days    aspirin 81 mg, Daily    B Complex Vitamins (vitamin b complex) capsule capsule 1 capsule, Daily    bisacodyl (DULCOLAX) 10 mg, Rectal, Daily PRN    cetirizine (ZYRTEC) 5 mg, Oral, Daily    donepezil (ARICEPT) 10 mg, Daily    Fiber 500 MG capsule 1 capsule, Daily    fluticasone (FLONASE) 50 MCG/ACT nasal spray 2 sprays, Daily    losartan (COZAAR) 25 mg, Oral, Daily    Magnesium 400 MG tablet 1 tablet, Daily    meclizine (ANTIVERT) 25 mg, Oral, 3 Times Daily PRN    melatonin 10 mg, Nightly    multivitamin with minerals (QC WOMENS DAILY MULTIVITAMIN PO) 1 tablet, Daily    nitroglycerin (NITROSTAT) 0.4 mg, Sublingual, Every 5 Minutes PRN, Take no more than 3 doses in 15 minutes.    pregabalin (LYRICA) 50 mg, Oral, 3 Times Daily    QUEtiapine (SEROQUEL) 25 mg, Oral, Nightly    rosuvastatin (CRESTOR) 10 mg, Daily    sennosides-docusate (PERICOLACE) 8.6-50 MG per tablet 2 tablets, Oral, 2 Times Daily PRN       Vitals:    05/15/25 1454   BP: 124/48   BP Location: Right arm   Patient Position: Sitting   Cuff Size: Adult   Pulse: 56   SpO2: 97%   Weight: 67.1 kg (148 lb)   Height: 162.6 cm (64\")     Body mass index is 25.4 kg/m².    PHYSICAL EXAM:    General Appearance:   well developed  well nourished  Neck:  thyroid not enlarged  supple  Respiratory:  no respiratory distress  normal breath sounds  no rales  Cardiovascular:  no jugular venous distention  regular rhythm  apical impulse normal  S1 normal, S2 normal  no S3, no S4   no murmur  no rub, no thrill  lower extremity edema: none    Skin:   warm, dry    RESULTS:   Procedures    Results for orders placed during the " "hospital encounter of 10/25/24    Adult Transthoracic Echo Complete W/ Cont if Necessary Per Protocol    Interpretation Summary    Left ventricular ejection fraction appears to be 56 - 60%.    Estimated right ventricular systolic pressure from tricuspid regurgitation is normal (<35 mmHg).    No significant valvular disease        Labs:  Lab Results   Component Value Date    CHOL 147 02/12/2025    TRIG 117 02/12/2025    HDL 61 (H) 02/12/2025    LDL 65 02/12/2025    AST 19 02/12/2025    ALT 16 02/12/2025     Lab Results   Component Value Date    HGBA1C 6.00 (H) 02/12/2025     Creatinine   Date Value Ref Range Status   02/16/2025 1.16 (H) 0.57 - 1.00 mg/dL Final   02/14/2025 1.13 (H) 0.57 - 1.00 mg/dL Final   02/13/2025 0.89 0.57 - 1.00 mg/dL Final     No results found for: \"EGFRIFNONA\"      ASSESSMENT:  Problem List Items Addressed This Visit       Coronary artery disease involving native coronary artery of native heart without angina pectoris - Primary    Overview   6/8/2022: LHC at [facility] for NSTEMI troponin 0.5.  Mid LAD 50 to 60%, IFR negative at 0.9, proximal and mid RCA tandem 30-40% stenosis, normal nondominant circumflex, LVEF 55 to 60%, LVEDP 20 mmHg.         Dyslipidemia, goal LDL below 70    Essential hypertension    Cognitive decline       PLAN:  Coronary artery disease  NSTEMI 6/8/22 with cardiac cath demonstrating moderate nonobstructive CAD, including iFR of mid LAD lesion   Normal LVEF  Continue ASA, statin  Asymptomatic without angina   No beta-blocker or antianginals necessary at this time     2.   Hypertension   Goal <140/90mmHg   BP well controlled      3.  Dyslipidemia   LDL 65 on recent labs  Continue Rosuvastatin 10mg        Advance Care Planning   ACP discussion was held with the patient during this visit. Patient does not have an advance directive, declines further assistance.          Follow-up   Return in about 1 year (around 5/15/2026).      Scribed for Antonino Koo MD by Natalie" [address]. 5/19/2025  08:58 EDT    I,Antonino Koo M.D., personally performed the services described in this documentation as scribed by the above named individual in my presence, and it is both accurate and complete.    Antonino Koo MD, [facility], Psychiatric Cardiology  05/19/25  08:58 EDT

## (undated) DEVICE — GW PRESSUREWIRE X WIRELESS FFR 175CM

## (undated) DEVICE — HI-TORQUE VERSACORE MODIFIED J GUIDE WIRE SYSTEM 260 CM: Brand: HI-TORQUE VERSACORE

## (undated) DEVICE — PK CATH CARD 10

## (undated) DEVICE — MODEL AT P65, P/N 701554-001KIT CONTENTS: HAND CONTROLLER, 3-WAY HIGH-PRESSURE STOPCOCK WITH ROTATING END AND PREMIUM HIGH-PRESSURE TUBING: Brand: ANGIOTOUCH® KIT

## (undated) DEVICE — MODEL BT2000 P/N 700287-012KIT CONTENTS: MANIFOLD WITH SALINE AND CONTRAST PORTS, SALINE TUBING WITH SPIKE AND HAND SYRINGE, TRANSDUCER: Brand: BT2000 AUTOMATED MANIFOLD KIT

## (undated) DEVICE — DEV COMP RAD PRELUDESYNC 24CM

## (undated) DEVICE — KT VLV HEMO MAP ACC PLS LG/BORE MTL/INTRO W/TORQ/DEV

## (undated) DEVICE — GUIDE CATHETER: Brand: MACH1™

## (undated) DEVICE — CATH DIAG EXPO .045 FL3  5F 100CM

## (undated) DEVICE — PINNACLE INTRODUCER SHEATH: Brand: PINNACLE

## (undated) DEVICE — ANGIO-SEAL VIP VASCULAR CLOSURE DEVICE: Brand: ANGIO-SEAL

## (undated) DEVICE — GLIDESHEATH SLENDER STAINLESS STEEL KIT: Brand: GLIDESHEATH SLENDER

## (undated) DEVICE — CATH DIAG EXPO M/ PK 5F FL4/FR4 PIG

## (undated) DEVICE — ST ACC MICROPUNCTURE .018 TRANSLSS/SS/TP 5F/10CM 21G/7CM

## (undated) DEVICE — GW PERIPH GUIDERIGHT STD/EXCHNG/J/TIP SS 0.035IN 5X260CM